# Patient Record
Sex: MALE | Race: WHITE | NOT HISPANIC OR LATINO | Employment: OTHER | URBAN - NONMETROPOLITAN AREA
[De-identification: names, ages, dates, MRNs, and addresses within clinical notes are randomized per-mention and may not be internally consistent; named-entity substitution may affect disease eponyms.]

---

## 2017-01-04 ENCOUNTER — TRANSCRIBE ORDERS (OUTPATIENT)
Dept: LAB | Facility: MEDICAL CENTER | Age: 67
End: 2017-01-04

## 2017-01-04 ENCOUNTER — APPOINTMENT (OUTPATIENT)
Dept: LAB | Facility: MEDICAL CENTER | Age: 67
End: 2017-01-04
Payer: MEDICARE

## 2017-01-04 DIAGNOSIS — G62.9 POLYNEUROPATHY: ICD-10-CM

## 2017-01-04 DIAGNOSIS — I73.9 PERIPHERAL VASCULAR DISEASE (HCC): ICD-10-CM

## 2017-01-04 DIAGNOSIS — I25.10 ATHEROSCLEROTIC HEART DISEASE OF NATIVE CORONARY ARTERY WITHOUT ANGINA PECTORIS: ICD-10-CM

## 2017-01-04 DIAGNOSIS — I10 ESSENTIAL (PRIMARY) HYPERTENSION: ICD-10-CM

## 2017-01-04 LAB
25(OH)D3 SERPL-MCNC: 55 NG/ML (ref 30–100)
ALBUMIN SERPL BCP-MCNC: 4 G/DL (ref 3.5–5)
ALP SERPL-CCNC: 66 U/L (ref 46–116)
ALT SERPL W P-5'-P-CCNC: 37 U/L (ref 12–78)
ANION GAP SERPL CALCULATED.3IONS-SCNC: 5 MMOL/L (ref 4–13)
AST SERPL W P-5'-P-CCNC: 24 U/L (ref 5–45)
BASOPHILS # BLD AUTO: 0.12 THOUSANDS/ΜL (ref 0–0.1)
BASOPHILS NFR BLD AUTO: 1 % (ref 0–1)
BILIRUB SERPL-MCNC: 1.09 MG/DL (ref 0.2–1)
BUN SERPL-MCNC: 22 MG/DL (ref 5–25)
CALCIUM SERPL-MCNC: 9.6 MG/DL (ref 8.3–10.1)
CHLORIDE SERPL-SCNC: 105 MMOL/L (ref 100–108)
CHOLEST SERPL-MCNC: 172 MG/DL (ref 50–200)
CO2 SERPL-SCNC: 29 MMOL/L (ref 21–32)
CREAT SERPL-MCNC: 1.24 MG/DL (ref 0.6–1.3)
EOSINOPHIL # BLD AUTO: 0.46 THOUSAND/ΜL (ref 0–0.61)
EOSINOPHIL NFR BLD AUTO: 4 % (ref 0–6)
ERYTHROCYTE [DISTWIDTH] IN BLOOD BY AUTOMATED COUNT: 14.6 % (ref 11.6–15.1)
EST. AVERAGE GLUCOSE BLD GHB EST-MCNC: 123 MG/DL
GFR SERPL CREATININE-BSD FRML MDRD: 58.3 ML/MIN/1.73SQ M
GLUCOSE SERPL-MCNC: 109 MG/DL (ref 65–140)
HBA1C MFR BLD: 5.9 % (ref 4.2–6.3)
HCT VFR BLD AUTO: 51.1 % (ref 36.5–49.3)
HDLC SERPL-MCNC: 39 MG/DL (ref 40–60)
HGB BLD-MCNC: 17.8 G/DL (ref 12–17)
LDLC SERPL CALC-MCNC: 111 MG/DL (ref 0–100)
LYMPHOCYTES # BLD AUTO: 3.91 THOUSANDS/ΜL (ref 0.6–4.47)
LYMPHOCYTES NFR BLD AUTO: 35 % (ref 14–44)
MCH RBC QN AUTO: 34.2 PG (ref 26.8–34.3)
MCHC RBC AUTO-ENTMCNC: 34.8 G/DL (ref 31.4–37.4)
MCV RBC AUTO: 98 FL (ref 82–98)
MONOCYTES # BLD AUTO: 1.51 THOUSAND/ΜL (ref 0.17–1.22)
MONOCYTES NFR BLD AUTO: 14 % (ref 4–12)
NEUTROPHILS # BLD AUTO: 5.12 THOUSANDS/ΜL (ref 1.85–7.62)
NEUTS SEG NFR BLD AUTO: 46 % (ref 43–75)
NRBC BLD AUTO-RTO: 0 /100 WBCS
PLATELET # BLD AUTO: 248 THOUSANDS/UL (ref 149–390)
PMV BLD AUTO: 9.7 FL (ref 8.9–12.7)
POTASSIUM SERPL-SCNC: 4.9 MMOL/L (ref 3.5–5.3)
PROT SERPL-MCNC: 7 G/DL (ref 6.4–8.2)
RBC # BLD AUTO: 5.2 MILLION/UL (ref 3.88–5.62)
SODIUM SERPL-SCNC: 139 MMOL/L (ref 136–145)
TRIGL SERPL-MCNC: 108 MG/DL
WBC # BLD AUTO: 11.15 THOUSAND/UL (ref 4.31–10.16)

## 2017-01-04 PROCEDURE — 80061 LIPID PANEL: CPT

## 2017-01-04 PROCEDURE — 36415 COLL VENOUS BLD VENIPUNCTURE: CPT

## 2017-01-04 PROCEDURE — 82306 VITAMIN D 25 HYDROXY: CPT

## 2017-01-04 PROCEDURE — 83036 HEMOGLOBIN GLYCOSYLATED A1C: CPT

## 2017-01-04 PROCEDURE — 85025 COMPLETE CBC W/AUTO DIFF WBC: CPT

## 2017-01-04 PROCEDURE — 80053 COMPREHEN METABOLIC PANEL: CPT

## 2017-01-05 ENCOUNTER — GENERIC CONVERSION - ENCOUNTER (OUTPATIENT)
Dept: OTHER | Facility: OTHER | Age: 67
End: 2017-01-05

## 2017-05-18 ENCOUNTER — APPOINTMENT (OUTPATIENT)
Dept: LAB | Facility: MEDICAL CENTER | Age: 67
End: 2017-05-18
Payer: MEDICARE

## 2017-05-18 ENCOUNTER — LAB CONVERSION - ENCOUNTER (OUTPATIENT)
Dept: OTHER | Facility: OTHER | Age: 67
End: 2017-05-18

## 2017-05-18 ENCOUNTER — TRANSCRIBE ORDERS (OUTPATIENT)
Dept: LAB | Facility: MEDICAL CENTER | Age: 67
End: 2017-05-18

## 2017-05-18 DIAGNOSIS — Z01.810 PRE-OPERATIVE CARDIOVASCULAR EXAMINATION: ICD-10-CM

## 2017-05-18 DIAGNOSIS — I25.10 ATHEROSCLEROSIS OF NATIVE CORONARY ARTERY, ANGINA PRESENCE UNSPECIFIED, UNSPECIFIED WHETHER NATIVE OR TRANSPLANTED HEART: Primary | ICD-10-CM

## 2017-05-18 DIAGNOSIS — I25.10 ATHEROSCLEROSIS OF NATIVE CORONARY ARTERY, ANGINA PRESENCE UNSPECIFIED, UNSPECIFIED WHETHER NATIVE OR TRANSPLANTED HEART: ICD-10-CM

## 2017-05-18 LAB
ANION GAP SERPL CALCULATED.3IONS-SCNC: 6 MMOL/L (ref 4–13)
BASOPHILS # BLD AUTO: 0.11 THOUSANDS/ΜL (ref 0–0.1)
BASOPHILS NFR BLD AUTO: 1 % (ref 0–1)
BUN SERPL-MCNC: 18 MG/DL (ref 5–25)
CALCIUM SERPL-MCNC: 9.9 MG/DL (ref 8.3–10.1)
CHLORIDE SERPL-SCNC: 102 MMOL/L (ref 100–108)
CO2 SERPL-SCNC: 30 MMOL/L (ref 21–32)
CREAT SERPL-MCNC: 1.28 MG/DL (ref 0.6–1.3)
EOSINOPHIL # BLD AUTO: 0.37 THOUSAND/ΜL (ref 0–0.61)
EOSINOPHIL NFR BLD AUTO: 3 % (ref 0–6)
ERYTHROCYTE [DISTWIDTH] IN BLOOD BY AUTOMATED COUNT: 15.1 % (ref 11.6–15.1)
GFR SERPL CREATININE-BSD FRML MDRD: 56.2 ML/MIN/1.73SQ M
GLUCOSE SERPL-MCNC: 126 MG/DL (ref 65–140)
HCT VFR BLD AUTO: 48 % (ref 36.5–49.3)
HGB BLD-MCNC: 16.2 G/DL (ref 12–17)
LYMPHOCYTES # BLD AUTO: 3.34 THOUSANDS/ΜL (ref 0.6–4.47)
LYMPHOCYTES NFR BLD AUTO: 30 % (ref 14–44)
MCH RBC QN AUTO: 32.5 PG (ref 26.8–34.3)
MCHC RBC AUTO-ENTMCNC: 33.8 G/DL (ref 31.4–37.4)
MCV RBC AUTO: 96 FL (ref 82–98)
MONOCYTES # BLD AUTO: 1.4 THOUSAND/ΜL (ref 0.17–1.22)
MONOCYTES NFR BLD AUTO: 13 % (ref 4–12)
NEUTROPHILS # BLD AUTO: 5.73 THOUSANDS/ΜL (ref 1.85–7.62)
NEUTS SEG NFR BLD AUTO: 53 % (ref 43–75)
NRBC BLD AUTO-RTO: 0 /100 WBCS
PLATELET # BLD AUTO: 275 THOUSANDS/UL (ref 149–390)
PMV BLD AUTO: 9.9 FL (ref 8.9–12.7)
POTASSIUM SERPL-SCNC: 4.1 MMOL/L (ref 3.5–5.3)
RBC # BLD AUTO: 4.99 MILLION/UL (ref 3.88–5.62)
SODIUM SERPL-SCNC: 138 MMOL/L (ref 136–145)
WBC # BLD AUTO: 10.99 THOUSAND/UL (ref 4.31–10.16)

## 2017-05-18 PROCEDURE — 80048 BASIC METABOLIC PNL TOTAL CA: CPT

## 2017-05-18 PROCEDURE — 85025 COMPLETE CBC W/AUTO DIFF WBC: CPT

## 2017-05-18 PROCEDURE — 36415 COLL VENOUS BLD VENIPUNCTURE: CPT

## 2017-05-31 ENCOUNTER — GENERIC CONVERSION - ENCOUNTER (OUTPATIENT)
Dept: OTHER | Facility: OTHER | Age: 67
End: 2017-05-31

## 2017-06-12 ENCOUNTER — GENERIC CONVERSION - ENCOUNTER (OUTPATIENT)
Dept: OTHER | Facility: OTHER | Age: 67
End: 2017-06-12

## 2017-06-19 ENCOUNTER — GENERIC CONVERSION - ENCOUNTER (OUTPATIENT)
Dept: OTHER | Facility: OTHER | Age: 67
End: 2017-06-19

## 2017-07-14 ENCOUNTER — APPOINTMENT (OUTPATIENT)
Dept: LAB | Facility: MEDICAL CENTER | Age: 67
End: 2017-07-14
Payer: MEDICARE

## 2017-07-14 ENCOUNTER — TRANSCRIBE ORDERS (OUTPATIENT)
Dept: LAB | Facility: MEDICAL CENTER | Age: 67
End: 2017-07-14

## 2017-07-14 ENCOUNTER — ALLSCRIPTS OFFICE VISIT (OUTPATIENT)
Dept: OTHER | Facility: OTHER | Age: 67
End: 2017-07-14

## 2017-07-14 DIAGNOSIS — E55.9 VITAMIN D DEFICIENCY: ICD-10-CM

## 2017-07-14 DIAGNOSIS — R53.83 OTHER FATIGUE: ICD-10-CM

## 2017-07-14 LAB
25(OH)D3 SERPL-MCNC: 33.5 NG/ML (ref 30–100)
ANION GAP SERPL CALCULATED.3IONS-SCNC: 4 MMOL/L (ref 4–13)
BUN SERPL-MCNC: 18 MG/DL (ref 5–25)
CALCIUM SERPL-MCNC: 9.4 MG/DL (ref 8.3–10.1)
CHLORIDE SERPL-SCNC: 100 MMOL/L (ref 100–108)
CO2 SERPL-SCNC: 29 MMOL/L (ref 21–32)
CREAT SERPL-MCNC: 1.16 MG/DL (ref 0.6–1.3)
ERYTHROCYTE [DISTWIDTH] IN BLOOD BY AUTOMATED COUNT: 15.8 % (ref 11.6–15.1)
GFR SERPL CREATININE-BSD FRML MDRD: >60 ML/MIN/1.73SQ M
GLUCOSE P FAST SERPL-MCNC: 111 MG/DL (ref 65–99)
HCT VFR BLD AUTO: 47 % (ref 36.5–49.3)
HGB BLD-MCNC: 15.9 G/DL (ref 12–17)
MAGNESIUM SERPL-MCNC: 2 MG/DL (ref 1.6–2.6)
MCH RBC QN AUTO: 31.5 PG (ref 26.8–34.3)
MCHC RBC AUTO-ENTMCNC: 33.8 G/DL (ref 31.4–37.4)
MCV RBC AUTO: 93 FL (ref 82–98)
PLATELET # BLD AUTO: 337 THOUSANDS/UL (ref 149–390)
PMV BLD AUTO: 9.6 FL (ref 8.9–12.7)
POTASSIUM SERPL-SCNC: 4.6 MMOL/L (ref 3.5–5.3)
RBC # BLD AUTO: 5.04 MILLION/UL (ref 3.88–5.62)
SODIUM SERPL-SCNC: 133 MMOL/L (ref 136–145)
TESTOST SERPL-MCNC: 320.8 NG/DL (ref 241–827)
TSH SERPL DL<=0.05 MIU/L-ACNC: 1.68 UIU/ML (ref 0.36–3.74)
WBC # BLD AUTO: 10.66 THOUSAND/UL (ref 4.31–10.16)

## 2017-07-14 PROCEDURE — 83735 ASSAY OF MAGNESIUM: CPT

## 2017-07-14 PROCEDURE — 86618 LYME DISEASE ANTIBODY: CPT

## 2017-07-14 PROCEDURE — 84443 ASSAY THYROID STIM HORMONE: CPT

## 2017-07-14 PROCEDURE — 84403 ASSAY OF TOTAL TESTOSTERONE: CPT

## 2017-07-14 PROCEDURE — 82306 VITAMIN D 25 HYDROXY: CPT

## 2017-07-14 PROCEDURE — 85027 COMPLETE CBC AUTOMATED: CPT

## 2017-07-14 PROCEDURE — 36415 COLL VENOUS BLD VENIPUNCTURE: CPT

## 2017-07-14 PROCEDURE — 80048 BASIC METABOLIC PNL TOTAL CA: CPT

## 2017-07-17 LAB
B BURGDOR IGG SER IA-ACNC: 0.15
B BURGDOR IGM SER IA-ACNC: 0.36

## 2017-08-24 ENCOUNTER — GENERIC CONVERSION - ENCOUNTER (OUTPATIENT)
Dept: OTHER | Facility: OTHER | Age: 67
End: 2017-08-24

## 2017-09-26 ENCOUNTER — GENERIC CONVERSION - ENCOUNTER (OUTPATIENT)
Dept: OTHER | Facility: OTHER | Age: 67
End: 2017-09-26

## 2017-09-28 ENCOUNTER — GENERIC CONVERSION - ENCOUNTER (OUTPATIENT)
Dept: OTHER | Facility: OTHER | Age: 67
End: 2017-09-28

## 2017-10-27 ENCOUNTER — GENERIC CONVERSION - ENCOUNTER (OUTPATIENT)
Dept: OTHER | Facility: OTHER | Age: 67
End: 2017-10-27

## 2018-01-04 ENCOUNTER — ALLSCRIPTS OFFICE VISIT (OUTPATIENT)
Dept: OTHER | Facility: OTHER | Age: 68
End: 2018-01-04

## 2018-01-04 DIAGNOSIS — Z13.89 ENCOUNTER FOR SCREENING FOR OTHER DISORDER: ICD-10-CM

## 2018-01-04 DIAGNOSIS — R10.9 ABDOMINAL PAIN: ICD-10-CM

## 2018-01-04 DIAGNOSIS — E55.9 VITAMIN D DEFICIENCY: ICD-10-CM

## 2018-01-04 DIAGNOSIS — I10 ESSENTIAL (PRIMARY) HYPERTENSION: ICD-10-CM

## 2018-01-04 DIAGNOSIS — I73.9 PERIPHERAL VASCULAR DISEASE (HCC): ICD-10-CM

## 2018-01-04 DIAGNOSIS — I25.10 ATHEROSCLEROTIC HEART DISEASE OF NATIVE CORONARY ARTERY WITHOUT ANGINA PECTORIS: ICD-10-CM

## 2018-01-04 DIAGNOSIS — Z12.5 ENCOUNTER FOR SCREENING FOR MALIGNANT NEOPLASM OF PROSTATE: ICD-10-CM

## 2018-01-08 ENCOUNTER — APPOINTMENT (OUTPATIENT)
Dept: LAB | Facility: MEDICAL CENTER | Age: 68
End: 2018-01-08
Payer: MEDICARE

## 2018-01-08 ENCOUNTER — TRANSCRIBE ORDERS (OUTPATIENT)
Dept: LAB | Facility: MEDICAL CENTER | Age: 68
End: 2018-01-08

## 2018-01-08 DIAGNOSIS — Z13.89 ENCOUNTER FOR SCREENING FOR OTHER DISORDER: ICD-10-CM

## 2018-01-08 DIAGNOSIS — I25.10 ATHEROSCLEROTIC HEART DISEASE OF NATIVE CORONARY ARTERY WITHOUT ANGINA PECTORIS: ICD-10-CM

## 2018-01-08 DIAGNOSIS — Z12.5 ENCOUNTER FOR SCREENING FOR MALIGNANT NEOPLASM OF PROSTATE: ICD-10-CM

## 2018-01-08 DIAGNOSIS — I10 ESSENTIAL (PRIMARY) HYPERTENSION: ICD-10-CM

## 2018-01-08 DIAGNOSIS — I73.9 PERIPHERAL VASCULAR DISEASE (HCC): ICD-10-CM

## 2018-01-08 LAB
ALBUMIN SERPL BCP-MCNC: 3.5 G/DL (ref 3.5–5)
ALP SERPL-CCNC: 103 U/L (ref 46–116)
ALT SERPL W P-5'-P-CCNC: 27 U/L (ref 12–78)
ANION GAP SERPL CALCULATED.3IONS-SCNC: 5 MMOL/L (ref 4–13)
AST SERPL W P-5'-P-CCNC: 24 U/L (ref 5–45)
BACTERIA UR QL AUTO: ABNORMAL /HPF
BILIRUB SERPL-MCNC: 0.64 MG/DL (ref 0.2–1)
BILIRUB UR QL STRIP: NEGATIVE
BUN SERPL-MCNC: 18 MG/DL (ref 5–25)
CALCIUM SERPL-MCNC: 9.3 MG/DL (ref 8.3–10.1)
CAOX CRY URNS QL MICRO: ABNORMAL /HPF
CHLORIDE SERPL-SCNC: 107 MMOL/L (ref 100–108)
CHOLEST SERPL-MCNC: 151 MG/DL (ref 50–200)
CLARITY UR: CLEAR
CO2 SERPL-SCNC: 26 MMOL/L (ref 21–32)
COLOR UR: YELLOW
CREAT SERPL-MCNC: 1.18 MG/DL (ref 0.6–1.3)
ERYTHROCYTE [SEDIMENTATION RATE] IN BLOOD: 11 MM/HOUR (ref 0–10)
GFR SERPL CREATININE-BSD FRML MDRD: 63 ML/MIN/1.73SQ M
GLUCOSE P FAST SERPL-MCNC: 113 MG/DL (ref 65–99)
GLUCOSE UR STRIP-MCNC: NEGATIVE MG/DL
HDLC SERPL-MCNC: 35 MG/DL (ref 40–60)
HGB UR QL STRIP.AUTO: NEGATIVE
HYALINE CASTS #/AREA URNS LPF: ABNORMAL /LPF
KETONES UR STRIP-MCNC: NEGATIVE MG/DL
LDLC SERPL CALC-MCNC: 98 MG/DL (ref 0–100)
LEUKOCYTE ESTERASE UR QL STRIP: NEGATIVE
MAGNESIUM SERPL-MCNC: 2.3 MG/DL (ref 1.6–2.6)
MUCOUS THREADS UR QL AUTO: ABNORMAL
NITRITE UR QL STRIP: NEGATIVE
NON-SQ EPI CELLS URNS QL MICRO: ABNORMAL /HPF
PH UR STRIP.AUTO: 6 [PH] (ref 4.5–8)
POTASSIUM SERPL-SCNC: 4.6 MMOL/L (ref 3.5–5.3)
PROT SERPL-MCNC: 7.2 G/DL (ref 6.4–8.2)
PROT UR STRIP-MCNC: ABNORMAL MG/DL
PSA SERPL-MCNC: 0.8 NG/ML (ref 0–4)
RBC #/AREA URNS AUTO: ABNORMAL /HPF
SODIUM SERPL-SCNC: 138 MMOL/L (ref 136–145)
SP GR UR STRIP.AUTO: 1.02 (ref 1–1.03)
TRIGL SERPL-MCNC: 90 MG/DL
UROBILINOGEN UR QL STRIP.AUTO: 1 E.U./DL
WBC #/AREA URNS AUTO: ABNORMAL /HPF

## 2018-01-08 PROCEDURE — 85652 RBC SED RATE AUTOMATED: CPT

## 2018-01-08 PROCEDURE — 81001 URINALYSIS AUTO W/SCOPE: CPT

## 2018-01-08 PROCEDURE — 36415 COLL VENOUS BLD VENIPUNCTURE: CPT

## 2018-01-08 PROCEDURE — 80053 COMPREHEN METABOLIC PANEL: CPT

## 2018-01-08 PROCEDURE — 83735 ASSAY OF MAGNESIUM: CPT

## 2018-01-08 PROCEDURE — G0103 PSA SCREENING: HCPCS

## 2018-01-08 PROCEDURE — 80061 LIPID PANEL: CPT

## 2018-01-09 ENCOUNTER — GENERIC CONVERSION - ENCOUNTER (OUTPATIENT)
Dept: OTHER | Facility: OTHER | Age: 68
End: 2018-01-09

## 2018-01-11 ENCOUNTER — HOSPITAL ENCOUNTER (OUTPATIENT)
Dept: RADIOLOGY | Facility: HOSPITAL | Age: 68
Discharge: HOME | End: 2018-01-11
Attending: INTERNAL MEDICINE
Payer: MEDICARE

## 2018-01-11 DIAGNOSIS — R10.9 ABDOMINAL PAIN: ICD-10-CM

## 2018-01-11 PROCEDURE — 74018 RADEX ABDOMEN 1 VIEW: CPT

## 2018-01-14 VITALS
WEIGHT: 158.13 LBS | HEIGHT: 70 IN | BODY MASS INDEX: 22.64 KG/M2 | OXYGEN SATURATION: 97 % | HEART RATE: 51 BPM | DIASTOLIC BLOOD PRESSURE: 72 MMHG | SYSTOLIC BLOOD PRESSURE: 118 MMHG | TEMPERATURE: 96.6 F

## 2018-01-22 VITALS
DIASTOLIC BLOOD PRESSURE: 74 MMHG | HEIGHT: 70 IN | BODY MASS INDEX: 25.09 KG/M2 | OXYGEN SATURATION: 98 % | SYSTOLIC BLOOD PRESSURE: 126 MMHG | HEART RATE: 51 BPM | TEMPERATURE: 96.9 F | WEIGHT: 175.25 LBS

## 2018-01-23 NOTE — PROGRESS NOTES
Assessment   1  Encounter for preventive health examination (V70 0) (Z00 00)    Plan  Atherosclerotic heart disease of native coronary artery without angina pectoris    · (1) LIPID PANEL, FASTING; Status:Active; Requested for:50Wdt8502; Atherosclerotic heart disease of native coronary artery without angina pectoris,  Hypertension, Neuropathy    · (1) HEMOGLOBIN A1C; Status:Active; Requested for:79Lof2917; Atherosclerotic heart disease of native coronary artery without angina pectoris, PVD  (peripheral vascular disease)    · (1) CBC/PLT/DIFF; Status:Active; Requested for:16Fyp5892; Health Maintenance    · *VB - Fall Risk Assessment  (Dx Z13 89 Screen for Neurologic Disorder);  Status:Complete;   Done: 40ESZ4252 01:08PM   · *VB - Urinary Incontinence Screen (Dx Z13 89 Screen for UI); Status:Complete;   Done:  20HGA9513 01:09PM   · *VB-Depression Screening; Status:Complete;   Done: 51QUG3953 01:09PM  Hypertension, Neuropathy    · (1) VITAMIN D 25-HYDROXY; Status:Active; Requested for:56Fjh3268;   Hypertension, PVD (peripheral vascular disease)    · (1) COMPREHENSIVE METABOLIC PANEL; Status:Active; Requested for:20Hhp2470;    · Follow-up visit in 1 year Evaluation and Treatment  Follow-up  Status: Hold For -  Scheduling  Requested for: 85Grc8086  Refused pneumococcal vaccination    · Temporarily Stop: Pneumo (Pneumovax)  Unlinked    · Temporarily Stop: Influenza    Discussion/Summary    Rx for fbw  Stop smoking  Cardio appt upcoming  Deferred flu shot1  Impression:1  Subsequent Annual Wellness Visit1 , with preventive exam as well as age and risk appropriate counseling completed1   Cardiovascular screening and counselin  screening is current1   Diabetes screening and counselin  screening is current1   Colorectal cancer screening and counselin  the patient declines screening1  and counseling was given on ways to eat a high fiber diet1      Prostate cancer screening and counselin  screening is current1   Osteoporosis screening and counselin  screening not indicated1 , counseling was given on obtaining adequate amounts of calcium and vitamin D on a daily basis1  and counseling was given on the importance of regular weightbearing exercise1   Abdominal aortic aneurysm screening and counselin  screening is current1   Glaucoma screening and counselin  screening is current1   HIV screening and counselin  screening not indicated1   Immunizations:1  The patient declines the influenza vaccination1 , the patient declines the pneumococcal vaccination1 , hepatitis B vaccination series is not indicated at this time due to the patient's low risk of aicha the disease1 , the patient declines the Zostavax vaccine1 , the patient declines the Td vaccine1  and the patient declines the Tdap vaccine1   Advance Directive Plannin  not complete1 , he was encouraged to follow-up with me to discuss his questions and/or decisions1   Advice and education were given regarding1  increasing physical activity1   Patient Discussion:1  plan discussed with the patient1 , follow-up visit needed in 6 months1 , follow-up as needed1   [facility]    The treatment plan was reviewed with the patient/guardian  The patient/guardian understands and agrees with the treatment plan1      Self Referrals: No       1 Amended By: Yfn Dodson; Dec 29 2016 8:28 PM EST    Chief Complaint  pt is being seen today for a wellness visit  discussed medication with pt  no med refills needed today  pt has no complaints or concerns  Advance Directives  Advance Directive [facility]:   YES - Patient has an advance health care directive  The patient has a living will located  in patient's home  History of Present Illness  HPI: Pt doing ok  Still smoking  no new sxs1    Welcome to [employer] and Wellness Visits: The patient is being seen for the  subsequent annual wellness visit1  1      Medicare Screening and Risk Factors1    Hospitalizations:1  he has been previously hospitalizied1  and he has been hospitalized cad,pvd times1   Once per lifetime medicare screening tests:1  ECG1  and AAA screening US has not yet been done1   Medicare Screening Tests Risk Questions   Abdominal aortic aneurysm risk assessment:1  tobacco use1  and over 72years of age2   Osteoporosis risk assessment:1  caucasian1 , over 48years of age2  and tobacco use1   HIV risk assessment:1  none indicated1   Drug and Alcohol Use:1  The patient  is a current cigarette smoker1  1   He  has smoked for 50 year(s)1  1   He  is not ready to quit using tobacco1  1   The patient reports  rare alcohol use1  1   Alcohol concern: 1   The patient has no concerns about alcohol abuse1   He  has never used illicit drugs1  1   Diet and Physical Activity:1  Current diet includes  low salt food choices1  1   He limited by claudication1   The patient does not exercise1    Mood Disorder and Cognitive Impairment Screenin    Depression screening1   Negative for symptoms1   He denies feeling down, depressed, or hopeless over the past two weeks1   He denies feeling little interest or pleasure in doing things over the past two weeks1   Cognitive impairment screenin  denies difficulty learning/retaining new information1 , denies difficulty handling complex tasks1 , denies difficulty with reasoning1 , denies difficulty with spatial ability and orientation1 , denies difficulty with language1  and denies difficulty with behavior1   Functional Ability/Level of Safety:1  Hearing is1  slightly decreased1  and a hearing aid is not used1   He denies hearing difficulties1  The patient is currently1  able to do activities of daily living with limitations1 , able to do instrumental activities of daily living with limitations1 , able to participate in social activities with limitations1  and able to drive with limitations1    Activities of daily living details:1  does not need help using the phone1 , no transportation help needed1 , does not need help shopping1 , no meal preparation help needed1 , does not need help doing housework1 , does not need help doing laundry1 , does not need help managing medications1  and does not need help managing money1   Fall risk factors: 1  The patient fell 0 times in the past 12 months  1   Home safety risk factors: 1  no unfamiliar surroundings1 , no loose rugs1 , no poor household lighting1 , no uneven floors1 , no household clutter1 , grab bars in the bathroom1  and handrails on the stairs1   Advance Directives:1  Advance directives: 1  no living will1 , no durable power of  for health care directives1  and no advance directives1   End of life decisions were reviewed with the patient1  and I agree with the patient's decisions1   Co-Managers and Medical Equipment/Suppliers: See Patient Care Team   Reviewed Updated [employer]:   Last Medicare Wellness Visit Information was reviewed, patient interviewed, no change since last AWV1   1 Amended By: Ann Bethea; Dec 29 2016 8:24 PM EST    Patient Care Team    Care Team Member Role Specialty Office Number   Ty Harry DO  Internal Medicine [phone]     Active Problems   1  Advance directive discussed with patient (V65 49) (Z71 89)  2  Atherosclerotic heart disease of native coronary artery without angina pectoris (414 01)   (I25 10)  3  Colonoscopy (Fiberoptic) Screening  4  Hypertension (401 9) (I10)  5  Neuropathy (355 9) (G62 9)  6  PVD (peripheral vascular disease) (443 9) (I73 9)  7  Shingles outbreak (053 9) (B02 9)  8  Special screening examination for neoplasm of prostate (V76 44) (Z12 5)  9  Vitamin D deficiency (268 9) (E55 9)    Past Medical History    The active problems and past medical history were reviewed and updated today        Surgical History    · History of Arterial Catheterization   · History of CABG   · History of Cath Stent Placement   · History of Implantable Cardioverter-Defibrillator    The surgical history was reviewed and updated today  Family History  Brother    · Family history of Polyps Of The Sigmoid Colon    The family history was reviewed and updated today  Social History    · Always uses seat belt   · Always uses sunscreen   · Being A Social Drinker   · Caffeine use (V49 89) (F15 90)   · Current every day smoker (305 1) (F17 200)   · Dental care, regularly   · No drug use   · Patient has living will (V49 89) (Z78 9)  The social history was reviewed and updated today  The social history was reviewed and is unchanged  Current Meds  1  Atorvastatin Calcium 40 MG Oral Tablet; Take 1 tablet daily Recorded  2  Digoxin 125 MCG Oral Tablet; TAKE 1 TABLET EVERY DAY Recorded  3  Enalapril Maleate 5 MG Oral Tablet; Therapy: 07TNG1409 to (Last Rx:13Oct2011)  Requested for: 13Oct2011 Ordered  4  Furosemide 20 MG Oral Tablet; take 1 tablet daily as needed Recorded  5  Gabapentin 300 MG Oral Capsule; TAKE 1 CAPSULE AT BEDTIME; Therapy: 27BKC7295 to )  Requested for: 20ZKM4161; Last   Rx:44Gss1339 Ordered  6  Isosorbide Mononitrate ER 30 MG Oral Tablet Extended Release 24 Hour; take one half   tablet daily Recorded  7  Metoprolol Succinate  MG Oral Tablet Extended Release 24 Hour; Therapy: 60OLF4774 to (Last Rx:13Oct2011)  Requested for: 13Oct2011 Ordered  8  Plavix 75 MG Oral Tablet; Therapy: 80FLE4376 to (Last Rx:13Oct2011)  Requested for: 13Oct2011 Ordered  9  Spironolactone 25 MG Oral Tablet; Therapy: 89VGI9109 to (Last Rx:13Oct2011)  Requested for: 13Oct2011 Ordered  10  Vitamin D3 21777 UNIT Oral Capsule; Take 1 each week; Therapy: 34DOO7009 to (Last Rx:72Cyq5723)  Requested for: 94Nik2726 Ordered  11  Xarelto 20 MG Oral Tablet; Take 1 tablet daily Recorded    The medication list was reviewed and updated today  Allergies   1   Penicillins    Immunizations   1    Influenza  Nov 2011 Vitals  Signs    Systolic: 006  Diastolic: 72   Temperature: 97 5 F  Height: 5 ft 10 in  Weight: 175 lb 4 00 oz  BMI Calculated: 25 15  BSA Calculated: 1 98    Physical Exam    Constitutional1    General appearance: No acute distress, well appearing and well nourished  1    Psychiatric1    Judgment and insight: Normal 1    Orientation to person, place and time: Normal 1    Recent and remote memory: Intact  1    Mood and affect: Normal 1        1 Amended By: Katty Crooks; Dec 29 2016 8:26 PM EST    Results/Data  Falls Risk Assessment (Dx Z13 89 Screen for Neurologic Disorder) 05Def2647 01:09PM User, Ahs     Test Name Result Flag Reference   Falls Risk      No falls in the past year     PHQ-2 Adult Depression Screening 84Kmw4048 01:09PM User, Ahs     Test Name Result Flag Reference   PHQ-2 Adult Depression Score 0     Over the last two weeks, how often have you been bothered by any of the following problems?   Little interest or pleasure in doing things: Not at all - 0  Feeling down, depressed, or hopeless: Not at all - 0   PHQ-2 Adult Depression Screening Negative       *VB - Urinary Incontinence Screen (Dx Z13 89 Screen for UI) 76IFQ2962 01:09PM Katty Sequin     Test Name Result Flag Reference   Urinary Incontinence Assessment 46QTY9739       *VB-Depression Screening 41GGD6294 01:09PM Katty Sequin     Test Name Result Flag Reference   Depression Scale Result      Depression Screen - Negative For Symptoms     *VB - Fall Risk Assessment  (Dx Z13 89 Screen for Neurologic Disorder) 77Ble0991 01:08PM Katty Sequin     Test Name Result Flag Reference   Falls Risk      No falls in the past year       Signatures   Electronically signed by : John South DO; Dec 29 2016  8:28PM EST                       (Author)

## 2018-01-25 ENCOUNTER — HOSPITAL ENCOUNTER (OUTPATIENT)
Dept: CT IMAGING | Facility: HOSPITAL | Age: 68
Discharge: HOME | End: 2018-01-25
Attending: INTERNAL MEDICINE
Payer: MEDICARE

## 2018-01-25 DIAGNOSIS — R10.9 ABDOMINAL PAIN: ICD-10-CM

## 2018-01-25 PROCEDURE — 74176 CT ABD & PELVIS W/O CONTRAST: CPT

## 2018-01-29 ENCOUNTER — TREATMENT (OUTPATIENT)
Dept: INTERNAL MEDICINE CLINIC | Facility: CLINIC | Age: 68
End: 2018-01-29

## 2018-01-29 DIAGNOSIS — N20.0 BILATERAL KIDNEY STONES: Primary | ICD-10-CM

## 2018-02-01 ENCOUNTER — CONSULT (OUTPATIENT)
Dept: UROLOGY | Facility: HOSPITAL | Age: 68
End: 2018-02-01
Payer: MEDICARE

## 2018-02-01 VITALS
SYSTOLIC BLOOD PRESSURE: 122 MMHG | DIASTOLIC BLOOD PRESSURE: 70 MMHG | HEIGHT: 69 IN | WEIGHT: 170 LBS | HEART RATE: 58 BPM | BODY MASS INDEX: 25.18 KG/M2

## 2018-02-01 DIAGNOSIS — N20.0 BILATERAL KIDNEY STONES: Primary | ICD-10-CM

## 2018-02-01 DIAGNOSIS — Z12.5 PROSTATE CANCER SCREENING: ICD-10-CM

## 2018-02-01 LAB
SL AMB  POCT GLUCOSE, UA: NORMAL
SL AMB LEUKOCYTE ESTERASE,UA: NORMAL
SL AMB POCT BILIRUBIN,UA: NORMAL
SL AMB POCT BLOOD,UA: NORMAL
SL AMB POCT CLARITY,UA: NORMAL
SL AMB POCT COLOR,UA: YELLOW
SL AMB POCT KETONES,UA: NORMAL
SL AMB POCT NITRITE,UA: NORMAL
SL AMB POCT PH,UA: 5
SL AMB POCT SPECIFIC GRAVITY,UA: 1.02

## 2018-02-01 PROCEDURE — 99204 OFFICE O/P NEW MOD 45 MIN: CPT | Performed by: UROLOGY

## 2018-02-01 PROCEDURE — 81002 URINALYSIS NONAUTO W/O SCOPE: CPT | Performed by: UROLOGY

## 2018-02-01 RX ORDER — DIGOXIN 125 MCG
1 TABLET ORAL DAILY
COMMUNITY
End: 2018-05-02 | Stop reason: ALTCHOICE

## 2018-02-01 RX ORDER — MAG HYDROX/ALUMINUM HYD/SIMETH 400-400-40
SUSPENSION, ORAL (FINAL DOSE FORM) ORAL WEEKLY
COMMUNITY
Start: 2015-12-24 | End: 2018-03-20

## 2018-02-01 RX ORDER — ISOSORBIDE MONONITRATE 30 MG/1
0.5 TABLET, EXTENDED RELEASE ORAL DAILY
COMMUNITY
End: 2018-03-20

## 2018-02-01 RX ORDER — METOPROLOL SUCCINATE 200 MG/1
TABLET, EXTENDED RELEASE ORAL
COMMUNITY
Start: 2011-10-13 | End: 2018-03-20

## 2018-02-01 RX ORDER — SPIRONOLACTONE 25 MG/1
25 TABLET ORAL DAILY
COMMUNITY
Start: 2011-10-13 | End: 2018-07-12 | Stop reason: SDUPTHER

## 2018-02-01 RX ORDER — CLOPIDOGREL BISULFATE 75 MG/1
75 TABLET ORAL DAILY
COMMUNITY
Start: 2011-10-13 | End: 2018-05-02 | Stop reason: ALTCHOICE

## 2018-02-01 RX ORDER — ENALAPRIL MALEATE 5 MG/1
5 TABLET ORAL DAILY
COMMUNITY
Start: 2011-10-13 | End: 2019-05-09 | Stop reason: SDUPTHER

## 2018-02-01 RX ORDER — ATORVASTATIN CALCIUM 40 MG/1
1 TABLET, FILM COATED ORAL DAILY
COMMUNITY
End: 2018-10-01 | Stop reason: SDUPTHER

## 2018-02-01 RX ORDER — FUROSEMIDE 20 MG/1
20 TABLET ORAL DAILY
COMMUNITY
End: 2018-07-12 | Stop reason: SDUPTHER

## 2018-02-01 NOTE — PATIENT INSTRUCTIONS
Prostate Specific Antigen   WHAT YOU NEED TO KNOW:   What is a prostate specific antigen (PSA) test?  A PSA test is a blood test used to screen men for prostate cancer  A PSA test is also used to monitor how well prostate cancer treatment is working  What other test may be done with a PSA test?  A digital rectal exam is usually performed with a PSA test  Your healthcare provider will insert a gloved finger into your rectum to feel if your prostate is large, firm, or has lumps  Who needs a PSA test?  Some experts recommend a PSA test for men ages 48 to 79  They also recommend testing men with a high risk for prostate cancer at age 36 or 39  Risk factors may include being  or having a brother or father with prostate cancer  Other experts may not recommend PSA testing  Your healthcare provider can help you decide if you need a PSA test    What do the results of a PSA test mean? Most healthy men have a PSA level less than 4 ng/mL  If your PSA level is higher than 4 ng/mL you may need more tests  Examples include blood or urine tests, an ultrasound, MRI, CT scan, or a prostate biopsy  Ask your healthcare provider for more information on these tests  What else can cause a high PSA level? A high PSA level does not always mean you have prostate cancer  Certain conditions or procedures can increase PSA levels  Examples include:  · Older age    · Procedures such as a prostate biopsy or a cystoscopy    · An enlarged prostate    · Recent sexual activity    · A prostate infection    · Certain exercises that put pressure on the prostate such as bicycling    · Medicine such as testosterone  CARE AGREEMENT:   You have the right to help plan your care  Learn about your health condition and how it may be treated  Discuss treatment options with your caregivers to decide what care you want to receive  You always have the right to refuse treatment  The above information is an  only   It is not intended as medical advice for individual conditions or treatments  Talk to your doctor, nurse or pharmacist before following any medical regimen to see if it is safe and effective for you  © 2017 [address]  Information is for End User's use only and may not be sold, redistributed or otherwise used for commercial purposes  All illustrations and images included in CareNotes® are the copyrighted property of [employer]  or [address]  Kidney Stones   WHAT YOU NEED TO KNOW:   What is a kidney stone? Kidney stones form in the urinary system when the water and waste in your urine are out of balance  When this happens, certain types of waste crystals separate from the urine  The crystals build up and form kidney stones  Kidney stones can be made of uric acid, calcium, phosphate, or oxalate crystals  You may have 1 or more kidney stones  What increases my risk for kidney stones? · You do not drink enough liquids (especially water) each day  · You have urinary tract infections often  · You follow a certain type of diet  For example, people who eat a diet high in meat or salt may be at higher risk for kidney stones  People who eat foods high in oxalate may also be at higher risk  Foods that are high in oxalate include nuts, chocolate, coffee, and green leafy vegetables  · You take certain medicines such as diuretics, steroids, and antacids  · A family member has had kidney stones  · You were born with a kidney or bowel disorder, or you have other medical problems such as gout  What are the signs and symptoms of kidney stones? · Pain in the middle of your back that moves across to your side or that may spread to your groin    · Nausea and vomiting    · Urge to urinate often, burning feeling when you urinate, or pink or red urine    · Tenderness in your lower back, side, or stomach  How are kidney stones diagnosed?   Your healthcare provider will ask about your health, diet, and lifestyle  He may refer you to a urologist  Silvia Nguyen may need tests to find out what type of kidney stones you have  Tests can show the size of your kidney stones and where they are in your urinary system  You may have one or more of the following:  · Urine tests  may show if you have blood in your urine  They may also show high amounts of the substances that form kidney stones, such as uric acid  · Blood tests  show how well your kidneys are working  They may also be used to check the levels of calcium or uric acid in your blood  · A noncontrast helical CT scan  is a type of x-ray that uses a computer to take pictures of your kidneys  Healthcare providers use the pictures to check for kidney stones or other problems  · X-rays  of your kidneys, bladder, and ureters may be done  You may be given a dye before the pictures are taken to help healthcare providers see the pictures better  You may need to have more than one x-ray  Tell the healthcare provider if you have ever had an allergic reaction to contrast dye  · An abdominal ultrasound  uses sound waves to show pictures of your abdomen on a monitor  How are kidney stones treated? · NSAIDs , such as ibuprofen, help decrease swelling, pain, and fever  This medicine is available with or without a doctor's order  NSAIDs can cause stomach bleeding or kidney problems in certain people  If you take blood thinner medicine, always ask your healthcare provider if NSAIDs are safe for you  Always read the medicine label and follow directions  · Prescription medicine  may be given  Ask how to take this medicine safely  · Medicines  to balance your electrolytes may be needed  · A procedure or surgery  to remove the kidney stones may be needed if they do not pass on their own  Your treatment will depend on the size and location of your kidney stones  How can I manage my symptoms? · Drink plenty of liquids    Your healthcare provider may tell you to drink at least 8 to 12 (eight-ounce) cups of liquids each day  This helps flush out the kidney stones when you urinate  Water is the best liquid to drink  · Strain your urine every time you go to the bathroom  Urinate through a strainer or a piece of thin cloth to catch the stones  Take the stones to your healthcare provider so they can be sent to the lab for tests  This will help your healthcare providers plan the best treatment for you  · Eat a variety of healthy foods  Healthy foods include fruits, vegetables, whole-grain breads, low-fat dairy products, beans, and fish  You may need to limit how much sodium (salt) or protein you eat  Ask for information about the best foods for you  · Exercise regularly  Your stones may pass more easily if you stay active  Ask about the best activities for you  When should I seek immediate care? · You have vomiting that is not relieved by medicine  When should I contact my healthcare provider? · You have a fever  · You have trouble passing urine  · You see blood in your urine  · You have severe pain  · You have any questions or concerns about your condition or care  CARE AGREEMENT:   You have the right to help plan your care  Learn about your health condition and how it may be treated  Discuss treatment options with your caregivers to decide what care you want to receive  You always have the right to refuse treatment  The above information is an  only  It is not intended as medical advice for individual conditions or treatments  Talk to your doctor, nurse or pharmacist before following any medical regimen to see if it is safe and effective for you  © 2017 [address] Information is for End User's use only and may not be sold, redistributed or otherwise used for commercial purposes   All illustrations and images included in CareNotes® are the copyrighted property of A [employer] , Inc  or [employer]

## 2018-02-01 NOTE — LETTER
February 1, 2018     Dolly Fink DO  [address]  [place] [id number]    Patient: Joshua Baptiste   YOB: 1950   Date of Visit: 2/1/2018       Dear Dr Fer Rosa: Thank you for referring Irineo Langston to me for evaluation  Below are my notes for this consultation  If you have questions, please do not hesitate to call me  I look forward to following your patient along with you  Sincerely,        Daly Iglesias MD        CC: DO Daly Gerard MD  2/1/2018  1:19 PM  Sign at close encounter    UROLOGY NEW CONSULT NOTE     History of Present Illness:   Joshua Baptiste is a 79 y o  male here with recent imaging questionable for kidney stones  Patient has no history of kidney stones in the past   He denies flank pain  He has some lower abdominal discomfort  He reports occasional constipation  He does not have abdominal pain after eating  He does have a very significant cardiovascular history with vascular occlusion  He has had bilateral iliac stents  He has been seen by the vascular surgery team and been told he needs an aortic bypass  However, his cardiologist is felt that this is potentially too risky  Patient has had PSA screening but an unclear history of rectal exams  Past Medical History:     Past Medical History:   Diagnosis Date    Heart disease     Hypertension     Neuropathy     PVD (peripheral vascular disease) ([facility] [address] )     Vitamin D deficiency        PAST SURGICAL HISTORY:     Past Surgical History:   Procedure Laterality Date    CARDIAC CATHETERIZATION  1999    CARDIAC DEFIBRILLATOR PLACEMENT  2009    CORONARY ARTERY BYPASS GRAFT  1999       CURRENT MEDICATIONS:     No current outpatient prescriptions on file  No current facility-administered medications for this visit          ALLERGIES:   Allergies not on file    SOCIAL HISTORY:     Social History     Social History    Marital status: /Civil Union Spouse name: N/A    Number of children: N/A    Years of education: N/A     Social History Main Topics    Smoking status: Current Every Day Smoker    Smokeless tobacco: Not on file    Alcohol use Yes      Comment: social    Drug use: No    Sexual activity: Not on file     Other Topics Concern    Not on file     Social History Narrative    No narrative on file       SOCIAL HISTORY:     Family History   Problem Relation Age of Onset    Colon polyps Brother        REVIEW OF SYSTEMS:     General: negative for chills, fatigue, fever, significant unplanned weight changed  Psychological: negative for anxiety, depression, concentration or memory difficulties, irritability, mood swings, sleep disturbances  Ophthalmic: negative for blurry vision or double  ENT: negative for hearing difficulties, tinnitus, vertigo  Hematological and Lymphatic: negative for bleeding problems, blood clots, bruising, swollen lymph nodes  Respiratory: negative for shortness of breath, cough, hemoptysis, orthopnea, tachypnea or wheezing  Cardiovascular: negative for chest pain, dyspnea on exertion, edema, irregular or rapid heartbeat, paroxysmal nocturnal dyspnea  Gastrointestinal: negative for abdominal pain, bright red blood in stools, change in stools, constipation, diarrhea, nausea/vomiting, stool incontinence    GENITOURINARY: see HPI    Musculoskeletal: negative for gait disturbance, joint pain, joint stiffness/sweeling/pain, muscular weakness  Dermatological: negative for rash or skin lesion changes  Neurological: negative for confusion, dizziness, headaches, memory loss, numbness/tingling, seizures, speech problems, tremors or weakness    PHYSICAL EXAM:     There were no vitals taken for this visit  General:  Healthy appearing individual in no acute distress  They have a normal affect  There is not appear to be any gross neurologic defects or abnormalities  HEENT:  Normocephalic, atraumatic    Neck is supple without any palpable lymphadenopathy  Cardiovascular:  Patient has normal palpable distal radial pulses  There is no significant peripheral edema  No JVD is noted  Respiratory:  Patient has unlabored respirations  There is no audible wheeze or rhonchi  Abdomen:  Abdomen is soft and nontender  There is no tympany  Inguinal and umbilical hernia are not appreciated  Genitourinary: no penile lesions or discharge, no testicular masses or tenderness, no hernias    Musculoskeletal:  Patient does not have significant CVA tenderness in the bilateral flank with palpation or percussion  They full range of motion in all 4 extremities  Strength in all 4 extremities appears congruent  Patient is able to ambulate without assistance or difficulty  Dermatologic:  Patient has no skin abnormalities or rashes  LABS:     CBC:   Lab Results   Component Value Date    WBC 10 66 (H) 07/14/2017    HGB 15 9 07/14/2017    HCT 47 0 07/14/2017    MCV 93 07/14/2017     07/14/2017       BMP:   Lab Results   Component Value Date    GLUCOSE 126 05/18/2017    CALCIUM 9 3 01/08/2018     01/08/2018    K 4 6 01/08/2018    CO2 26 01/08/2018     01/08/2018    BUN 18 01/08/2018    CREATININE 1 18 01/08/2018     Lab Results   Component Value Date    PSA 0 8 01/08/2018    PSA 1 0 10/07/2016       URINE:  Urine Microscopic   Order: [id number] - Reflex for Order [id number]   Status:  Final result   Visible to patient:  No (Inaccessible in [address])   Next appt: Today at 01:00 PM in Urology Daly Iglesias MD)   Dx:  Essential (primary) hypertension; Enc      Ref Range & Units 1/8/18  7:08 AM Flag   RBC, UA None Seen, 0-5 /hpf  None Seen     WBC, UA None Seen, 0-5, 5-55, 5-65 /hpf  None Seen     Epithelial Cells None Seen, Occasional /hpf  None Seen     Bacteria, UA None Seen, Occasional /hpf  Occasional     Hyaline Casts, UA None Seen /lpf 0-3   A    Ca Oxalate Manju, UA None Seen /hpf  Moderate   A    MUCOUS THREADS Occasional, Moderate, Innumerable  Occasional        Specimen Collected: 18  7:08 AM Last Resulted: 18  7:51 PM              IMAGIN/25/18  CT ABDOMEN AND PELVIS WITHOUT IV CONTRAST     INDICATION:  Abdominal pain     COMPARISON: Abdominal radiograph 2018     TECHNIQUE:  CT examination of the abdomen and pelvis was performed without intravenous contrast   Reformatted images were created in axial, sagittal, and coronal planes        Radiation dose length product (DLP) for this visit:  322 28 mGy-cm   This examination, like all CT scans performed in the [facility], was performed utilizing techniques to minimize radiation dose exposure, including the use of iterative   reconstruction and automated exposure control       Enteric contrast was administered       FINDINGS:     ABDOMEN     LOWER CHEST:  Atelectatic changes are noted at the lung bases  No other significant abnormality identified in the lower chest      LIVER/BILIARY TREE:  Unremarkable      GALLBLADDER:  There are gallstone(s) within the gallbladder, without pericholecystic inflammatory changes      SPLEEN:  Unremarkable      PANCREAS:  Unremarkable      ADRENAL GLANDS:  Unremarkable      KIDNEYS/URETERS:  Punctate 2 mm bilateral nonobstructing renal calculus  There are bilateral renal vascular calcifications  No ureteral calculus or obstructive uropathy      STOMACH AND BOWEL:  Small hiatal hernia with reflux of contrast into the distal esophagus  No bowel obstruction  Mild stool retention throughout the colon      APPENDIX:  A normal appendix was visualized      ABDOMINOPELVIC CAVITY:  No ascites or free intraperitoneal air  No lymphadenopathy      VESSELS:  There are atherosclerotic changes of the abdominal aorta and its major branches  There are bilateral iliac stents    No aneurysm      PELVIS     REPRODUCTIVE ORGANS:  There are calcifications within the prostate      URINARY BLADDER:  Unremarkable      ABDOMINAL WALL/INGUINAL REGIONS:  There are fat-containing bilateral inguinal hernias containing fluid      OSSEOUS STRUCTURES:  No acute fracture or destructive osseous lesion      IMPRESSION:  1  Bilateral nonobstructing renal calculi  No obstructive uropathy  2   Cholelithiasis without other evidence of acute cholecystitis  3   Mild stool retention throughout the colon  4   Small hiatal hernia with reflux of contrast into the distal esophagus  ASSESSMENT:     79 y o  male with bilateral nonobstructing calculi    PLAN:     1  I personally reviewed the images from his CT and I do feel that the patient's picture is more consistent with vascular calcifications that collecting system stones  Regardless, any calcification are small and nonobstructing and are not the cause of his lower abdominal pain  I would be happy to see him back in 1 year with an x-ray and ultrasound  Should the patient developed significant symptoms, he should call my office  I did recommend he discuss blood flow to his abdominal organs when he sees his cardiologist upcoming  Certainly with his vascular history, this should be evaluated by a vascular team  I offered the patient a referral to a vascular surgeon but he defers as he has a team he has previously seen and is closely following with his cardiologist     For nonobstructing and asymptomatic stones I typically recommend non operative surveillance with yearly imaging in the form of x-ray and ultrasound testing  Should the patient develops symptoms, they know to call me so we can expedite sooner films and discuss sooner intervention  If the stones remain nonobstructing and asymptomatic, I will typically continue surveillance until the stones reach a larger size, approximately 1 5 cm     2    I gave the patient a general overview surrounding prostate health  We discussed the gland's anatomy and function  We discussed that PSA is protein made by the prostate gland in normal healthy males       When screening for prostate cancer, we evaluate man at high risk for prostate cancer or those men within a predefined age range who have a lfe expectancy greater than ten years  These screening guidelines were set forth by our colleagues at the [address] [place] [address] in an effort to help find early, treatable cancers but als to minimize worry and harm caused by over-screening and over-treatment  Screening is performed by both examining the prostate via a digital rectal exam and by checking a PSA in routine bloodwork  Should there PSA be elevated outside of an acceptable range or if they're found to have abnormalities on digital rectal exam, a careful discussion needs to be held about proceeding to the next step in management and obtaining tissue through prostate biopsy for evaluation for cancer  We discussed that PSA is an imperfect screening tool and there are other reasons at the PSA can be elevated including but not limited to urinary infection, section transmitted infection, benign prostatic enlargement, urinary stones, trauma, and recent sexual activity  The patient and I discussed his medical comorbidities  It is unclear whether not the patient has a 10 year life expectancy given his major cardiovascular issues  I offered him either yearly FRITZ and PSA screening or the option of deferring prostate cancer screening given his other competing comorbidities  He agrees that prostate cancer screening is not prudent at this point time given the other active issues  We will again discuss prostate cancer screening when he returns to see me in 1 year  I will recommend to his primary care team that no additional PSAs are undertaken and I would be happy to order these in the future if the patient desires

## 2018-02-01 NOTE — PROGRESS NOTES
UROLOGY NEW CONSULT NOTE     History of Present Illness:   Alexander Garza is a 79 y o  male here with recent imaging questionable for kidney stones  Patient has no history of kidney stones in the past   He denies flank pain  He has some lower abdominal discomfort  He reports occasional constipation  He does not have abdominal pain after eating  He does have a very significant cardiovascular history with vascular occlusion  He has had bilateral iliac stents  He has been seen by the vascular surgery team and been told he needs an aortic bypass  However, his cardiologist is felt that this is potentially too risky  Patient has had PSA screening but an unclear history of rectal exams  Past Medical History:     Past Medical History:   Diagnosis Date    Heart disease     Hypertension     Neuropathy     PVD [facility]peripheral vascular disease) ([facility] [address]     Vitamin D deficiency        PAST SURGICAL HISTORY:     Past Surgical History:   Procedure Laterality Date    CARDIAC CATHETERIZATION  1999    CARDIAC DEFIBRILLATOR PLACEMENT  2009    CORONARY ARTERY BYPASS GRAFT  1999       CURRENT MEDICATIONS:     No current outpatient prescriptions on file  No current facility-administered medications for this visit          ALLERGIES:   Allergies not on file    SOCIAL HISTORY:     Social History     Social History    Marital status: /Civil Union     Spouse name: N/A    Number of children: N/A    Years of education: N/A     Social History Main Topics    Smoking status: Current Every Day Smoker    Smokeless tobacco: Not on file    Alcohol use Yes      Comment: social    Drug use: No    Sexual activity: Not on file     Other Topics Concern    Not on file     Social History Narrative    No narrative on file       SOCIAL HISTORY:     Family History   Problem Relation Age of Onset    Colon polyps Brother        REVIEW OF SYSTEMS:     General: negative for chills, fatigue, fever, significant unplanned weight changed  Psychological: negative for anxiety, depression, concentration or memory difficulties, irritability, mood swings, sleep disturbances  Ophthalmic: negative for blurry vision or double  ENT: negative for hearing difficulties, tinnitus, vertigo  Hematological and Lymphatic: negative for bleeding problems, blood clots, bruising, swollen lymph nodes  Respiratory: negative for shortness of breath, cough, hemoptysis, orthopnea, tachypnea or wheezing  Cardiovascular: negative for chest pain, dyspnea on exertion, edema, irregular or rapid heartbeat, paroxysmal nocturnal dyspnea  Gastrointestinal: negative for abdominal pain, bright red blood in stools, change in stools, constipation, diarrhea, nausea/vomiting, stool incontinence    GENITOURINARY: see HPI    Musculoskeletal: negative for gait disturbance, joint pain, joint stiffness/sweeling/pain, muscular weakness  Dermatological: negative for rash or skin lesion changes  Neurological: negative for confusion, dizziness, headaches, memory loss, numbness/tingling, seizures, speech problems, tremors or weakness    PHYSICAL EXAM:     There were no vitals taken for this visit  General:  Healthy appearing individual in no acute distress  They have a normal affect  There is not appear to be any gross neurologic defects or abnormalities  HEENT:  Normocephalic, atraumatic  Neck is supple without any palpable lymphadenopathy  Cardiovascular:  Patient has normal palpable distal radial pulses  There is no significant peripheral edema  No JVD is noted  Respiratory:  Patient has unlabored respirations  There is no audible wheeze or rhonchi  Abdomen:  Abdomen is soft and nontender  There is no tympany  Inguinal and umbilical hernia are not appreciated      Genitourinary: no penile lesions or discharge, no testicular masses or tenderness, no hernias    Musculoskeletal:  Patient does not have significant CVA tenderness in the bilateral flank with palpation or percussion  They full range of motion in all 4 extremities  Strength in all 4 extremities appears congruent  Patient is able to ambulate without assistance or difficulty  Dermatologic:  Patient has no skin abnormalities or rashes  LABS:     CBC:   Lab Results   Component Value Date    WBC 10 66 (H) 2017    HGB 15 9 2017    HCT 47 0 2017    MCV 93 2017     2017       BMP:   Lab Results   Component Value Date    GLUCOSE 126 2017    CALCIUM 9 3 2018     2018    K 4 6 2018    CO2 26 2018     2018    BUN 18 2018    CREATININE 1 18 2018     Lab Results   Component Value Date    PSA 0 8 2018    PSA 1 0 10/07/2016       URINE:  Urine Microscopic   Order: [id number] - Reflex for Order [id number]   Status:  Final result   Visible to patient:  No (Inaccessible in [address])   Next appt: Today at 01:00 PM in Urology Will Jose MD)   Dx:  Essential (primary) hypertension; Enc  Ref Range & Units 18  7:08 AM Flag   RBC, UA None Seen, 0-5 /hpf  None Seen     WBC, UA None Seen, 0-5, 5-55, 5-65 /hpf  None Seen     Epithelial Cells None Seen, Occasional /hpf  None Seen     Bacteria, UA None Seen, Occasional /hpf  Occasional     Hyaline Casts, UA None Seen /lpf 0-3   A    Ca Oxalate Manju, UA None Seen /hpf  Moderate   A    MUCOUS THREADS Occasional, Moderate, Innumerable  Occasional        Specimen Collected: 18  7:08 AM Last Resulted: 18  7:51 PM              IMAGIN/25/18  CT ABDOMEN AND PELVIS WITHOUT IV CONTRAST     INDICATION:  Abdominal pain     COMPARISON: Abdominal radiograph 2018     TECHNIQUE:  CT examination of the abdomen and pelvis was performed without intravenous contrast   Reformatted images were created in axial, sagittal, and coronal planes        Radiation dose length product (DLP) for this visit:  322 28 mGy-cm     This examination, like all CT scans performed in the [facility], was performed utilizing techniques to minimize radiation dose exposure, including the use of iterative   reconstruction and automated exposure control       Enteric contrast was administered       FINDINGS:     ABDOMEN     LOWER CHEST:  Atelectatic changes are noted at the lung bases  No other significant abnormality identified in the lower chest      LIVER/BILIARY TREE:  Unremarkable      GALLBLADDER:  There are gallstone(s) within the gallbladder, without pericholecystic inflammatory changes      SPLEEN:  Unremarkable      PANCREAS:  Unremarkable      ADRENAL GLANDS:  Unremarkable      KIDNEYS/URETERS:  Punctate 2 mm bilateral nonobstructing renal calculus  There are bilateral renal vascular calcifications  No ureteral calculus or obstructive uropathy      STOMACH AND BOWEL:  Small hiatal hernia with reflux of contrast into the distal esophagus  No bowel obstruction  Mild stool retention throughout the colon      APPENDIX:  A normal appendix was visualized      ABDOMINOPELVIC CAVITY:  No ascites or free intraperitoneal air  No lymphadenopathy      VESSELS:  There are atherosclerotic changes of the abdominal aorta and its major branches  There are bilateral iliac stents  No aneurysm      PELVIS     REPRODUCTIVE ORGANS:  There are calcifications within the prostate      URINARY BLADDER:  Unremarkable      ABDOMINAL WALL/INGUINAL REGIONS:  There are fat-containing bilateral inguinal hernias containing fluid      OSSEOUS STRUCTURES:  No acute fracture or destructive osseous lesion      IMPRESSION:  1  Bilateral nonobstructing renal calculi  No obstructive uropathy  2   Cholelithiasis without other evidence of acute cholecystitis  3   Mild stool retention throughout the colon  4   Small hiatal hernia with reflux of contrast into the distal esophagus  ASSESSMENT:     79 y o  male with bilateral nonobstructing calculi    PLAN:     1  I personally reviewed the images from his CT and I do feel that the patient's picture is more consistent with vascular calcifications that collecting system stones  Regardless, any calcification are small and nonobstructing and are not the cause of his lower abdominal pain  I would be happy to see him back in 1 year with an x-ray and ultrasound  Should the patient developed significant symptoms, he should call my office  I did recommend he discuss blood flow to his abdominal organs when he sees his cardiologist upcoming  Certainly with his vascular history, this should be evaluated by a vascular team  I offered the patient a referral to a vascular surgeon but he defers as he has a team he has previously seen and is closely following with his cardiologist     For nonobstructing and asymptomatic stones I typically recommend non operative surveillance with yearly imaging in the form of x-ray and ultrasound testing  Should the patient develops symptoms, they know to call me so we can expedite sooner films and discuss sooner intervention  If the stones remain nonobstructing and asymptomatic, I will typically continue surveillance until the stones reach a larger size, approximately 1 5 cm     2    I gave the patient a general overview surrounding prostate health  We discussed the gland's anatomy and function  We discussed that PSA is protein made by the prostate gland in normal healthy males  When screening for prostate cancer, we evaluate man at high risk for prostate cancer or those men within a predefined age range who have a lfe expectancy greater than ten years  These screening guidelines were set forth by our colleagues at the [address] [place] [address] in an effort to help find early, treatable cancers but als to minimize worry and harm caused by over-screening and over-treatment  Screening is performed by both examining the prostate via a digital rectal exam and by checking a PSA in routine bloodwork      Should there PSA be elevated outside of an acceptable range or if they're found to have abnormalities on digital rectal exam, a careful discussion needs to be held about proceeding to the next step in management and obtaining tissue through prostate biopsy for evaluation for cancer  We discussed that PSA is an imperfect screening tool and there are other reasons at the PSA can be elevated including but not limited to urinary infection, section transmitted infection, benign prostatic enlargement, urinary stones, trauma, and recent sexual activity  The patient and I discussed his medical comorbidities  It is unclear whether not the patient has a 10 year life expectancy given his major cardiovascular issues  I offered him either yearly FRITZ and PSA screening or the option of deferring prostate cancer screening given his other competing comorbidities  He agrees that prostate cancer screening is not prudent at this point time given the other active issues  We will again discuss prostate cancer screening when he returns to see me in 1 year  I will recommend to his primary care team that no additional PSAs are undertaken and I would be happy to order these in the future if the patient desires

## 2018-03-20 ENCOUNTER — APPOINTMENT (EMERGENCY)
Dept: RADIOLOGY | Facility: HOSPITAL | Age: 68
DRG: 310 | End: 2018-03-20
Payer: MEDICARE

## 2018-03-20 ENCOUNTER — HOSPITAL ENCOUNTER (INPATIENT)
Facility: HOSPITAL | Age: 68
LOS: 1 days | Discharge: ANOTHER HOSPITAL | DRG: 310 | End: 2018-03-22
Attending: EMERGENCY MEDICINE | Admitting: INTERNAL MEDICINE
Payer: MEDICARE

## 2018-03-20 DIAGNOSIS — R55 SYNCOPE: Primary | ICD-10-CM

## 2018-03-20 DIAGNOSIS — R77.8 ELEVATED TROPONIN: ICD-10-CM

## 2018-03-20 DIAGNOSIS — Z95.810 AICD (AUTOMATIC CARDIOVERTER/DEFIBRILLATOR) PRESENT: ICD-10-CM

## 2018-03-20 PROBLEM — I25.5 ISCHEMIC CARDIOMYOPATHY: Chronic | Status: ACTIVE | Noted: 2018-03-20

## 2018-03-20 PROBLEM — Z95.1 S/P CABG (CORONARY ARTERY BYPASS GRAFT): Chronic | Status: ACTIVE | Noted: 2018-03-20

## 2018-03-20 PROBLEM — Z72.0 TOBACCO ABUSE: Chronic | Status: ACTIVE | Noted: 2018-03-20

## 2018-03-20 PROBLEM — I73.9 PERIPHERAL ARTERIAL DISEASE (HCC): Chronic | Status: ACTIVE | Noted: 2018-03-20

## 2018-03-20 PROBLEM — I10 ESSENTIAL HYPERTENSION: Chronic | Status: ACTIVE | Noted: 2018-03-20

## 2018-03-20 LAB
ANION GAP SERPL CALCULATED.3IONS-SCNC: 11 MMOL/L (ref 4–13)
BASOPHILS # BLD AUTO: 0.11 THOUSANDS/ΜL (ref 0–0.1)
BASOPHILS NFR BLD AUTO: 1 % (ref 0–1)
BUN SERPL-MCNC: 20 MG/DL (ref 5–25)
CALCIUM SERPL-MCNC: 9.6 MG/DL (ref 8.3–10.1)
CHLORIDE SERPL-SCNC: 104 MMOL/L (ref 100–108)
CO2 SERPL-SCNC: 26 MMOL/L (ref 21–32)
CREAT SERPL-MCNC: 1.08 MG/DL (ref 0.6–1.3)
DIGOXIN SERPL-MCNC: 0.9 NG/ML (ref 0.8–2)
EOSINOPHIL # BLD AUTO: 0.36 THOUSAND/ΜL (ref 0–0.61)
EOSINOPHIL NFR BLD AUTO: 3 % (ref 0–6)
ERYTHROCYTE [DISTWIDTH] IN BLOOD BY AUTOMATED COUNT: 16.1 % (ref 11.6–15.1)
GFR SERPL CREATININE-BSD FRML MDRD: 71 ML/MIN/1.73SQ M
GLUCOSE SERPL-MCNC: 106 MG/DL (ref 65–140)
HCT VFR BLD AUTO: 49.1 % (ref 36.5–49.3)
HGB BLD-MCNC: 16.6 G/DL (ref 12–17)
INR PPP: 3.15 (ref 0.86–1.16)
LYMPHOCYTES # BLD AUTO: 2.54 THOUSANDS/ΜL (ref 0.6–4.47)
LYMPHOCYTES NFR BLD AUTO: 22 % (ref 14–44)
MAGNESIUM SERPL-MCNC: 1.9 MG/DL (ref 1.6–2.6)
MCH RBC QN AUTO: 30.6 PG (ref 26.8–34.3)
MCHC RBC AUTO-ENTMCNC: 33.8 G/DL (ref 31.4–37.4)
MCV RBC AUTO: 91 FL (ref 82–98)
MONOCYTES # BLD AUTO: 1.29 THOUSAND/ΜL (ref 0.17–1.22)
MONOCYTES NFR BLD AUTO: 11 % (ref 4–12)
NEUTROPHILS # BLD AUTO: 7.04 THOUSANDS/ΜL (ref 1.85–7.62)
NEUTS SEG NFR BLD AUTO: 63 % (ref 43–75)
PLATELET # BLD AUTO: 292 THOUSANDS/UL (ref 149–390)
PMV BLD AUTO: 9.1 FL (ref 8.9–12.7)
POTASSIUM SERPL-SCNC: 3.7 MMOL/L (ref 3.5–5.3)
PROTHROMBIN TIME: 32.6 SECONDS (ref 12.1–14.4)
RBC # BLD AUTO: 5.42 MILLION/UL (ref 3.88–5.62)
SODIUM SERPL-SCNC: 141 MMOL/L (ref 136–145)
TROPONIN I SERPL-MCNC: 0.05 NG/ML
WBC # BLD AUTO: 11.34 THOUSAND/UL (ref 4.31–10.16)

## 2018-03-20 PROCEDURE — 85025 COMPLETE CBC W/AUTO DIFF WBC: CPT | Performed by: EMERGENCY MEDICINE

## 2018-03-20 PROCEDURE — 85610 PROTHROMBIN TIME: CPT | Performed by: EMERGENCY MEDICINE

## 2018-03-20 PROCEDURE — 80162 ASSAY OF DIGOXIN TOTAL: CPT | Performed by: EMERGENCY MEDICINE

## 2018-03-20 PROCEDURE — 71046 X-RAY EXAM CHEST 2 VIEWS: CPT

## 2018-03-20 PROCEDURE — 99220 PR INITIAL OBSERVATION CARE/DAY 70 MINUTES: CPT | Performed by: INTERNAL MEDICINE

## 2018-03-20 PROCEDURE — 84484 ASSAY OF TROPONIN QUANT: CPT | Performed by: EMERGENCY MEDICINE

## 2018-03-20 PROCEDURE — 99285 EMERGENCY DEPT VISIT HI MDM: CPT

## 2018-03-20 PROCEDURE — 80048 BASIC METABOLIC PNL TOTAL CA: CPT | Performed by: EMERGENCY MEDICINE

## 2018-03-20 PROCEDURE — 36415 COLL VENOUS BLD VENIPUNCTURE: CPT | Performed by: EMERGENCY MEDICINE

## 2018-03-20 PROCEDURE — 83735 ASSAY OF MAGNESIUM: CPT | Performed by: EMERGENCY MEDICINE

## 2018-03-20 PROCEDURE — 93005 ELECTROCARDIOGRAM TRACING: CPT

## 2018-03-20 RX ORDER — POTASSIUM CHLORIDE 20MEQ/15ML
40 LIQUID (ML) ORAL ONCE
Status: COMPLETED | OUTPATIENT
Start: 2018-03-20 | End: 2018-03-20

## 2018-03-20 RX ORDER — MAGNESIUM SULFATE 1 G/100ML
1 INJECTION INTRAVENOUS ONCE
Status: COMPLETED | OUTPATIENT
Start: 2018-03-20 | End: 2018-03-20

## 2018-03-20 RX ORDER — NICOTINE 21 MG/24HR
1 PATCH, TRANSDERMAL 24 HOURS TRANSDERMAL DAILY
Status: DISCONTINUED | OUTPATIENT
Start: 2018-03-21 | End: 2018-03-22 | Stop reason: HOSPADM

## 2018-03-20 RX ORDER — ACETAMINOPHEN 325 MG/1
650 TABLET ORAL EVERY 6 HOURS PRN
Status: DISCONTINUED | OUTPATIENT
Start: 2018-03-20 | End: 2018-03-22 | Stop reason: HOSPADM

## 2018-03-20 RX ORDER — METOPROLOL SUCCINATE 100 MG/1
100 TABLET, EXTENDED RELEASE ORAL DAILY
COMMUNITY
End: 2018-07-12 | Stop reason: SDUPTHER

## 2018-03-20 RX ORDER — ISOSORBIDE MONONITRATE 60 MG/1
60 TABLET, EXTENDED RELEASE ORAL DAILY
COMMUNITY
End: 2018-05-02 | Stop reason: ALTCHOICE

## 2018-03-20 RX ADMIN — POTASSIUM CHLORIDE 40 MEQ: 20 SOLUTION ORAL at 23:00

## 2018-03-20 RX ADMIN — MAGNESIUM SULFATE HEPTAHYDRATE 1 G: 1 INJECTION, SOLUTION INTRAVENOUS at 22:43

## 2018-03-21 ENCOUNTER — APPOINTMENT (OUTPATIENT)
Dept: NON INVASIVE DIAGNOSTICS | Facility: HOSPITAL | Age: 68
DRG: 310 | End: 2018-03-21
Payer: MEDICARE

## 2018-03-21 LAB
ALBUMIN SERPL BCP-MCNC: 3.2 G/DL (ref 3.5–5)
ALP SERPL-CCNC: 85 U/L (ref 46–116)
ALT SERPL W P-5'-P-CCNC: 30 U/L (ref 12–78)
ANION GAP SERPL CALCULATED.3IONS-SCNC: 10 MMOL/L (ref 4–13)
AST SERPL W P-5'-P-CCNC: 26 U/L (ref 5–45)
ATRIAL RATE: 78 BPM
BILIRUB SERPL-MCNC: 0.6 MG/DL (ref 0.2–1)
BUN SERPL-MCNC: 19 MG/DL (ref 5–25)
CALCIUM SERPL-MCNC: 9.4 MG/DL (ref 8.3–10.1)
CHLORIDE SERPL-SCNC: 105 MMOL/L (ref 100–108)
CO2 SERPL-SCNC: 24 MMOL/L (ref 21–32)
CREAT SERPL-MCNC: 0.92 MG/DL (ref 0.6–1.3)
ERYTHROCYTE [DISTWIDTH] IN BLOOD BY AUTOMATED COUNT: 16.3 % (ref 11.6–15.1)
GFR SERPL CREATININE-BSD FRML MDRD: 86 ML/MIN/1.73SQ M
GLUCOSE SERPL-MCNC: 95 MG/DL (ref 65–140)
HCT VFR BLD AUTO: 46.8 % (ref 36.5–49.3)
HGB BLD-MCNC: 15.8 G/DL (ref 12–17)
INR PPP: 2.53 (ref 0.86–1.16)
MAGNESIUM SERPL-MCNC: 2.3 MG/DL (ref 1.6–2.6)
MCH RBC QN AUTO: 30.9 PG (ref 26.8–34.3)
MCHC RBC AUTO-ENTMCNC: 33.8 G/DL (ref 31.4–37.4)
MCV RBC AUTO: 91 FL (ref 82–98)
P AXIS: 45 DEGREES
PHOSPHATE SERPL-MCNC: 3.9 MG/DL (ref 2.3–4.1)
PLATELET # BLD AUTO: 277 THOUSANDS/UL (ref 149–390)
PMV BLD AUTO: 9.5 FL (ref 8.9–12.7)
POTASSIUM SERPL-SCNC: 4.7 MMOL/L (ref 3.5–5.3)
PR INTERVAL: 252 MS
PROT SERPL-MCNC: 6.8 G/DL (ref 6.4–8.2)
PROTHROMBIN TIME: 27.4 SECONDS (ref 12.1–14.4)
QRS AXIS: 58 DEGREES
QRSD INTERVAL: 114 MS
QT INTERVAL: 400 MS
QTC INTERVAL: 456 MS
RBC # BLD AUTO: 5.12 MILLION/UL (ref 3.88–5.62)
SODIUM SERPL-SCNC: 139 MMOL/L (ref 136–145)
T WAVE AXIS: 270 DEGREES
TROPONIN I SERPL-MCNC: 0.05 NG/ML
TROPONIN I SERPL-MCNC: 0.07 NG/ML
TSH SERPL DL<=0.05 MIU/L-ACNC: 0.71 UIU/ML (ref 0.36–3.74)
VENTRICULAR RATE: 78 BPM
WBC # BLD AUTO: 11.12 THOUSAND/UL (ref 4.31–10.16)

## 2018-03-21 PROCEDURE — 83735 ASSAY OF MAGNESIUM: CPT | Performed by: INTERNAL MEDICINE

## 2018-03-21 PROCEDURE — 84443 ASSAY THYROID STIM HORMONE: CPT | Performed by: INTERNAL MEDICINE

## 2018-03-21 PROCEDURE — 84484 ASSAY OF TROPONIN QUANT: CPT | Performed by: INTERNAL MEDICINE

## 2018-03-21 PROCEDURE — 84100 ASSAY OF PHOSPHORUS: CPT | Performed by: INTERNAL MEDICINE

## 2018-03-21 PROCEDURE — 93010 ELECTROCARDIOGRAM REPORT: CPT | Performed by: INTERNAL MEDICINE

## 2018-03-21 PROCEDURE — 85610 PROTHROMBIN TIME: CPT | Performed by: INTERNAL MEDICINE

## 2018-03-21 PROCEDURE — 99214 OFFICE O/P EST MOD 30 MIN: CPT | Performed by: INTERNAL MEDICINE

## 2018-03-21 PROCEDURE — 80053 COMPREHEN METABOLIC PANEL: CPT | Performed by: INTERNAL MEDICINE

## 2018-03-21 PROCEDURE — 85027 COMPLETE CBC AUTOMATED: CPT | Performed by: INTERNAL MEDICINE

## 2018-03-21 PROCEDURE — 93306 TTE W/DOPPLER COMPLETE: CPT

## 2018-03-21 PROCEDURE — 93306 TTE W/DOPPLER COMPLETE: CPT | Performed by: INTERNAL MEDICINE

## 2018-03-21 PROCEDURE — 99232 SBSQ HOSP IP/OBS MODERATE 35: CPT | Performed by: PHYSICIAN ASSISTANT

## 2018-03-21 RX ORDER — ATORVASTATIN CALCIUM 40 MG/1
40 TABLET, FILM COATED ORAL
Status: DISCONTINUED | OUTPATIENT
Start: 2018-03-21 | End: 2018-03-22 | Stop reason: HOSPADM

## 2018-03-21 RX ORDER — ENALAPRIL MALEATE 5 MG/1
5 TABLET ORAL DAILY
Status: DISCONTINUED | OUTPATIENT
Start: 2018-03-21 | End: 2018-03-22 | Stop reason: HOSPADM

## 2018-03-21 RX ORDER — METOPROLOL SUCCINATE 100 MG/1
100 TABLET, EXTENDED RELEASE ORAL DAILY
Status: DISCONTINUED | OUTPATIENT
Start: 2018-03-21 | End: 2018-03-22 | Stop reason: HOSPADM

## 2018-03-21 RX ORDER — FUROSEMIDE 20 MG/1
20 TABLET ORAL DAILY
Status: DISCONTINUED | OUTPATIENT
Start: 2018-03-21 | End: 2018-03-22 | Stop reason: HOSPADM

## 2018-03-21 RX ORDER — CLOPIDOGREL BISULFATE 75 MG/1
75 TABLET ORAL DAILY
Status: DISCONTINUED | OUTPATIENT
Start: 2018-03-21 | End: 2018-03-22 | Stop reason: HOSPADM

## 2018-03-21 RX ORDER — ISOSORBIDE MONONITRATE 60 MG/1
60 TABLET, EXTENDED RELEASE ORAL DAILY
Status: DISCONTINUED | OUTPATIENT
Start: 2018-03-21 | End: 2018-03-22 | Stop reason: HOSPADM

## 2018-03-21 RX ORDER — SPIRONOLACTONE 25 MG/1
25 TABLET ORAL DAILY
Status: DISCONTINUED | OUTPATIENT
Start: 2018-03-21 | End: 2018-03-22 | Stop reason: HOSPADM

## 2018-03-21 RX ORDER — DIGOXIN 125 MCG
125 TABLET ORAL DAILY
Status: DISCONTINUED | OUTPATIENT
Start: 2018-03-21 | End: 2018-03-22 | Stop reason: HOSPADM

## 2018-03-21 RX ADMIN — METOPROLOL SUCCINATE 100 MG: 100 TABLET, EXTENDED RELEASE ORAL at 08:21

## 2018-03-21 RX ADMIN — ATORVASTATIN CALCIUM 40 MG: 40 TABLET, FILM COATED ORAL at 16:50

## 2018-03-21 RX ADMIN — ISOSORBIDE MONONITRATE 60 MG: 60 TABLET, EXTENDED RELEASE ORAL at 08:21

## 2018-03-21 RX ADMIN — FUROSEMIDE 20 MG: 20 TABLET ORAL at 08:21

## 2018-03-21 RX ADMIN — CLOPIDOGREL BISULFATE 75 MG: 75 TABLET ORAL at 08:21

## 2018-03-21 RX ADMIN — RIVAROXABAN 20 MG: 10 TABLET, FILM COATED ORAL at 08:21

## 2018-03-21 RX ADMIN — SPIRONOLACTONE 25 MG: 25 TABLET, FILM COATED ORAL at 08:21

## 2018-03-21 RX ADMIN — ENALAPRIL MALEATE 5 MG: 5 TABLET ORAL at 08:21

## 2018-03-21 RX ADMIN — DIGOXIN 125 MCG: 125 TABLET ORAL at 08:21

## 2018-03-21 NOTE — SOCIAL WORK
Cm met with the patient to evaluate the patients prior function and living situation and any barriers to d/c and form a safe d/c plan  Cm also evaluated the patient for any services in the home or needs for services  Pt resides at home with his wife in a house  Has 3 SERGEI then 11 steps to his 2nd floor bedroom/bathroom  Pt is independent with his adls and ambulation  No services or DME  Pt and wife both drive  Re: pharmacy either uses mail order through [facility] [address] or uses [facility] in [place]  Pt plans on returning home on dc with outpatient follow up  Cm will continue to follow

## 2018-03-21 NOTE — ASSESSMENT & PLAN NOTE
Etiology unclear at this time  No events on telemetry  Cardiology consulted and would like his ICD interrogated however this will cannot to done until tomorrow 3/22/18  Thus the patient will now require a 2 midnight stay and will be changed to inpatient for continued workup of his syncope

## 2018-03-21 NOTE — ASSESSMENT & PLAN NOTE
Echocardiogram showed LV systolic function was severely reduced  Ejection fraction was estimated in the range of 25 % to 30 %  There was severe diffuse hypokinesis with regional variations  There was akinesis of the basal-mid anteroseptal and basal-mid inferior wall(s)  Wall thickness was mildly increased  Minimally elevated troponin secondary to his cardiomyopathy  Appreciate cardiology's input  Continue beta blocker and Imdur

## 2018-03-21 NOTE — CASE MANAGEMENT
Initial Clinical Review    Admission: Date/Time/Statement:   OBSERVATION 3/20/18 @ 2154 CONVERTED TO INPATIENT ADMISSION 3/21/18 @ 1431 FOR CONTINUED CARE & Tx  NEEDS PACER INTERROGATED 2ND SYNCOPAL EVENT  Orders Placed This Encounter   Procedures    Place in Observation     Standing Status:   Standing     Number of Occurrences:   1     Order Specific Question:   Admitting Physician     Answer:   Dhruv Reeves [[id number]]     Order Specific Question:   Level of Care     Answer:   Med Surg [16]   ED: Date/Time/Mode of Arrival:   ED Arrival Information     Expected Arrival Acuity Means of Arrival Escorted By Service Admission Type    - 3/20/2018 20:18 Urgent Ambulance [facility] General Medicine Urgent    Arrival Complaint    SYNCOPE      Chief Complaint:   Chief Complaint   Patient presents with    Syncope     Pt was at the bar and had a syncopal episode  Pt denies any pain, nausea or vomiting  History of Illness:   79 y o  male who presents to the ED tonight via EMS following a syncopal episode  He reports compliance with his prescribed medications & physician follow-up  No recent fever, malaise, skin rash, bleeding, dyspnea, cough, abd pain, diarrhea, vomiting, dysuria, headache, neck stiffness, hemiparesis or acute confusion  He was in his usual state of health this evening when, while seated at an [facility] facility bar having an alcoholic beverage, he abruptly felt lightheaded without any associated chest pain or palpitations prior to passing out for a "few seconds"  He regained consciousness & found himself laying on the floor without any tongue biting, postictal confusion, incontinence of stool or urine  He has felt well since then & currently has no complaints  He did not feel his AICD discharge prior to passing out    ED Vital Signs:   ED Triage Vitals [03/20/18 2023]   Temperature Pulse Respirations Blood Pressure SpO2   97 8 °F (36 6 °C) 80 17 126/80 99 %      Temp Source Heart Rate Source Patient Position - Orthostatic VS BP Location FiO2 (%)   Temporal Monitor Lying Right arm --      Pain Score       No Pain        Wt Readings from Last 1 Encounters:   03/21/18 76 kg (167 lb 8 8 oz)   Vital Signs (abnormal): Abnormal Labs/Diagnostic Test Results:   TROP 0 05, 0 07, 0 05  WBC 11 12 PT 27 4 INR 2 53  CXR=No acute cardiopulmonary disease  EKG=Interpretation: abnormal    Rate:     ECG rate:  78    ECG rate assessment: normal    Rhythm:     Rhythm: sinus rhythm    Ectopy:     Ectopy: PVCs      PVCs:  Multifocal and infrequent  QRS:     QRS axis:  Normal    QRS intervals: Wide  Conduction:     Conduction: abnormal      Abnormal conduction: incomplete LBBB and 1st degree    ST segments:     ST segments:  Normal  T waves:     T waves: normal    Comments:      Pr 252 qrs 114 qtc 456  ED Treatment:   Medication Administration from 03/20/2018 2018 to 03/20/2018 2338       Date/Time Order Dose Route Action Action by Comments     03/20/2018 2243 magnesium sulfate IVPB (premix) SOLN 1 g 1 g Intravenous New Bag Roxann Avila RN to infuse over 1 hour per verbal order from Dr Yassine Wu     03/20/2018 2300 potassium chloride 10 % oral solution 40 mEq 40 mEq Oral Given Savana Galindo RN       Past Medical/Surgical History:    Active Ambulatory Problems     Diagnosis Date Noted    No Active Ambulatory Problems     Resolved Ambulatory Problems     Diagnosis Date Noted    No Resolved Ambulatory Problems     Past Medical History:   Diagnosis Date    Essential hypertension 3/20/2018    Heart disease     Hypertension     Ischemic cardiomyopathy 3/20/2018    Neuropathy     Peripheral arterial disease ([address] ) 3/20/2018    [address] [facility]per[facility] [address] [address]     S/P CABG (coronary artery bypass graft) 3/20/2018    S/P implantation of automatic cardioverter/defibrillator (AICD) 3/20/2018    Tobacco abuse 3/20/2018    Vitamin D deficiency    Admitting Diagnosis: Syncope [R55]  Elevated troponin [R74 8]  AICD (automatic cardioverter/defibrillator) present [Z95 810]  Age/Sex: 79 y o  male  Assessment/Plan:   Principal Problem:    Syncope  Active Problems:    Tobacco abuse    S/P CABG (coronary artery bypass graft)    Peripheral arterial disease (HCC)    S/P implantation of automatic cardioverter/defibrillator (AICD)    Ischemic cardiomyopathy    Essential hypertension  Plan for the Primary Problem(s):  · Observe on telemetry, orthostatic vitals, serial troponin, TTE, cardiology consult  ? Resume usual cardiac meds, TSH  Plan for Additional Problems:   · Encouraged him to quit cigarette smoking  · IV mag sulfate & KCl to achieve serum Mg & K of 2 & 4 or > respectively     PER MD NOTE 5/44  Certification Statement: The patient, admitted on an observation basis, will now require > 2 midnight hospital stay due to continued workup for his syncopal episode  Syncope   Assessment & Plan     Etiology unclear at this time  No events on telemetry  Cardiology consulted and would like his ICD interrogated however this will cannot to done until tomorrow 3/22/18  Thus the patient will now require a 2 midnight stay and will be changed to inpatient for continued workup of his syncope         Ischemic cardiomyopathy   Assessment & Plan     Echocardiogram showed LV systolic function was severely reduced  Ejection fraction was estimated in the range of 25 % to 30 %  There was severe diffuse hypokinesis with regional variations  There was akinesis of the basal-mid anteroseptal and basal-mid inferior wall(s)  Wall thickness was mildly increased  Minimally elevated troponin secondary to his cardiomyopathy  Appreciate cardiology's input  Continue beta blocker and Imdur        S/P implantation of automatic cardioverter/defibrillator (AICD)   Assessment & Plan     ICD interrogation planned for tomorrow 3/22/18     Admission Orders:  TELEMETRY  ORTHOSTATIC BP  CONSULT CARDIO  ICD/PACER INTERROGATION  Scheduled Meds:   Current Facility-Administered Medications:  acetaminophen 650 mg Oral Q6H PRN Corinne Obey, MD   atorvastatin 40 mg Oral Daily With Dinner Corinne Obey, MD   clopidogrel 75 mg Oral Daily Corinne Obey, MD   digoxin 125 mcg Oral Daily Corinne Obey, MD   enalapril 5 mg Oral Daily Corinne Obey, MD   furosemide 20 mg Oral Daily Corinne Obey, MD   isosorbide mononitrate 60 mg Oral Daily Corinne Obey, MD   metoprolol succinate 100 mg Oral Daily Corinne Obey, MD   nicotine 1 patch Transdermal Daily Corinne Obey, MD   rivaroxaban 20 mg Oral Daily Corinne Obey, MD   spironolactone 25 mg Oral Daily Corinne Obey, MD     Continuous Infusions:    PRN Meds:   acetaminophen

## 2018-03-21 NOTE — H&P
History and Physical - 56 [address] [facility] Internal Medicine    Patient Information: Yolanda Zamarripa 79 y o  male MRN: [id number]  Unit/Bed#: UD32 Encounter: [id number]  Admitting Physician: Charlee Che MD  PCP: Jeremy Gardner DO  Date of Admission:  03/20/18    Assessment/Plan:    Hospital Problem List:     Principal Problem:    Syncope  Active Problems:    Tobacco abuse    S/P CABG (coronary artery bypass graft)    Peripheral arterial disease (HCC)    S/P implantation of automatic cardioverter/defibrillator (AICD)    Ischemic cardiomyopathy    Essential hypertension      Plan for the Primary Problem(s):  · Observe on telemetry, orthostatic vitals, serial troponin, TTE, cardiology consult  · Resume usual cardiac meds, TSH    Plan for Additional Problems:   · Encouraged him to quit cigarette smoking  · IV mag sulfate & KCl to achieve serum Mg & K of 2 & 4 or > respectively    VTE Prophylaxis: Rivaroxaban (Xarelto)  / sequential compression device   Code Status: full  POLST: There is no POLST form on file for this patient (pre-hospital)    Anticipated Length of Stay:  Patient will be admitted on an Observation basis with an anticipated length of stay of  < 2 midnights  Justification for Hospital Stay: cardiac monitoring    Chief Complaint:   "I passed out"    History of Present Illness:    Yolanda Zamarripa is a 79 y o  male who presents to the ED tonight via EMS following a syncopal episode  He reports compliance with his prescribed medications & physician follow-up  No recent fever, malaise, skin rash, bleeding, dyspnea, cough, abd pain, diarrhea, vomiting, dysuria, headache, neck stiffness, hemiparesis or acute confusion  He was in his usual state of health this evening when, while seated at an [facility] facility bar having an alcoholic beverage, he abruptly felt lightheaded without any associated chest pain or palpitations prior to passing out for a "few seconds"   He regained consciousness & found himself laying on the floor without any tongue biting, postictal confusion, incontinence of stool or urine  He has felt well since then & currently has no complaints  He did not feel his AICD discharge prior to passing out  Review of Systems:    A 10+ point review of systems was obtained & is as above, otherwise negative  Review of Systems    Past Medical and Surgical History:     Past Medical History:   Diagnosis Date    Essential hypertension 3/20/2018    Heart disease     Hypertension     Ischemic cardiomyopathy 3/20/2018    Neuropathy     Peripheral [address] disease ([address] 3/20/2018    [facility]VD [facility]per[facility]     S/P CABG (coronary artery bypass graft) 3/20/2018    S/P implantation of automatic cardioverter/defibrillator (AICD) 3/20/2018    Tobacco abuse 3/20/2018    Vitamin D deficiency        Past Surgical History:   Procedure Laterality Date    CARDIAC CATHETERIZATION  1999    CARDIAC DEFIBRILLATOR PLACEMENT  2009    CORONARY ARTERY BYPASS GRAFT  1999       Meds/Allergies:    Prior to Admission medications    Medication Sig Start Date End Date Taking?  Authorizing Provider   atorvastatin (LIPITOR) 40 mg tablet Take 1 tablet by mouth daily   Yes Historical Provider, MD   clopidogrel (PLAVIX) 75 mg tablet Take 75 mg by mouth daily   10/13/11  Yes Historical Provider, MD   digoxin (LANOXIN) 0 125 mg tablet Take 1 tablet by mouth daily   Yes Historical Provider, MD   enalapril (VASOTEC) 5 mg tablet Take 5 mg by mouth daily   10/13/11  Yes Historical Provider, MD   furosemide (LASIX) 20 mg tablet Take 20 mg by mouth daily     Yes Historical Provider, MD   isosorbide mononitrate (IMDUR) 60 mg 24 hr tablet Take 60 mg by mouth daily   Yes Historical Provider, MD   metoprolol succinate (TOPROL-XL) 100 mg 24 hr tablet Take 100 mg by mouth daily   Yes Historical Provider, MD   rivaroxaban (XARELTO) 20 mg tablet Take 1 tablet by mouth daily   Yes Historical Provider, MD   spironolactone (ALDACTONE) 25 mg tablet Take 25 mg by mouth daily   10/13/11  Yes Historical Provider, MD   Cholecalciferol (VITAMIN D3) 5000 units CAPS Take by mouth once a week 12/24/15 3/20/18  Historical Provider, MD   isosorbide mononitrate (IMDUR) 30 mg 24 hr tablet Take 0 5 tablets by mouth daily  3/20/18  Historical Provider, MD   metoprolol succinate (TOPROL-XL) 200 MG 24 hr tablet Take by mouth 10/13/11 3/20/18  Historical Provider, MD     I have reviewed home medications with patient personally  Allergies: Allergies   Allergen Reactions    Aspirin Hives    Penicillins        Social History:     Marital Status: /Civil Union   Patient Pre-hospital Living Situation: home    Substance Use History:   History   Alcohol Use    Yes     Comment: social     History   Smoking Status    Current Every Day Smoker   Smokeless Tobacco    Never Used     History   Drug Use No       Family History:    non-contributory    Physical Exam:   General: pleasant & cheerful, in no overt distress  HEENT: not pale, not jaundiced  Neck: supple, no JVD  Resp: clear lungs, no crackles or wheeze  Cardiovascular: S1 S2 audible, irregular  GI: soft abdomen, nontender, bowel sounds present  Musculoskeletal: no pedal edema or cyanosis  Skin: no petechiae or suspicious rash  Psych: appropriately oriented in all spheres, good insight  Neuro: Awake, alert, moves all extremities equally, essentially nonfocal      Vitals:   Blood Pressure: 135/68 (03/20/18 2300)  Pulse: 72 (03/20/18 2300)  Temperature: 97 8 °F (36 6 °C) (03/20/18 2023)  Temp Source: Temporal (03/20/18 2023)  Respirations: 16 (03/20/18 2300)  Height: 5' 9" (175 3 cm) (03/20/18 2023)  Weight - Scale: 79 1 kg (174 lb 4 8 oz) (03/20/18 2023)  SpO2: 97 % (03/20/18 2300)      Additional Data:     Lab Results: I have personally reviewed pertinent reports          Results from last 7 days  Lab Units 03/20/18 2105   WBC Thousand/uL 11 34*   HEMOGLOBIN g/dL 16 6   HEMATOCRIT % 49 1 PLATELETS Thousands/uL 292   NEUTROS PCT % 63   LYMPHS PCT % 22   MONOS PCT % 11   EOS PCT % 3       Results from last 7 days  Lab Units 03/20/18  2105   SODIUM mmol/L 141   POTASSIUM mmol/L 3 7   CHLORIDE mmol/L 104   CO2 mmol/L 26   BUN mg/dL 20   CREATININE mg/dL 1 08   CALCIUM mg/dL 9 6   GLUCOSE RANDOM mg/dL 106       Results from last 7 days  Lab Units 03/20/18  2105   INR  3 15*     Invalid input(s): TROIP    Imaging: I have personally reviewed pertinent reports  No results found  EKG, Pathology, and Other Studies Reviewed on Admission:   · EKG: NSR, PVCs    Allscripts Records Reviewed: Yes     ** Please Note: Dragon 360 Dictation voice to text software may have been used in the creation of this document

## 2018-03-21 NOTE — CONSULTS
Consultation - Cardiology   Amanda Graff 79 y o  male MRN: [id number]  Unit/Bed#: 843-90 Encounter: [id number]    Consults      Physician Requesting Consult: Erika Mayfield MD  Reason for Consult / Principal Problem: syncope    History of Present Illness   HPI: Amanda Graff is a 79y o  year old male who has a history of  coronary artery disease status post CABG 1999 for grafts, ventricular tachycardia status post ICD, ischemic cardiomyopathy, paroxysmal atrial fibrillation, peripheral arterial disease with multiple stents  The presents with an episode of syncope  Last ejection fraction 30%  The patient was at the [employer] last night having a cheeseburger and a couple of beers  He tells me that he slumped forward in the chair for a couple of seconds  He is not sure if he truly lost consciousness  He was sitting on a barstool he did not fall off the stool at all  He did not feels defibrillator fire  Earlier in the day he was not eating or drinking as well as he normally does  He has had no changes in medications  He denied any chest pain or discomfort, shortness of breath, palpitations or fluttering  Review of Systems   Constitution: Negative  HENT: Negative  Eyes: Negative  Cardiovascular: Negative  Respiratory: Negative  Endocrine: Negative  Hematologic/Lymphatic: Negative  Skin: Negative  Musculoskeletal: Negative  Gastrointestinal: Negative  Genitourinary: Negative  Neurological: Positive for light-headedness  Psychiatric/Behavioral: Negative        Historical Information   Past Medical History:   Diagnosis Date    Essential hypertension 3/20/2018    Heart disease     Hypertension     Ischemic cardiomyopathy 3/20/2018    Neuropathy     Per[address] [address] 3/20/2018    [address]VD (peripheral vascular disease) [facility] [address]     S/P CABG (coronary artery bypass graft) 3/20/2018    S/P implantation of automatic cardioverter/defibrillator (AICD) 3/20/2018    Tobacco abuse 3/20/2018    Vitamin D deficiency      Past Surgical History:   Procedure Laterality Date    CARDIAC CATHETERIZATION  1999    CARDIAC DEFIBRILLATOR PLACEMENT  2009    CORONARY ARTERY BYPASS GRAFT  1999     History   Alcohol Use    Yes     Comment: social     History   Drug Use No     History   Smoking Status    Current Every Day Smoker    Packs/day: 0 50   Smokeless Tobacco    Never Used     Family History: non-contributory    Meds/Allergies   all current active meds have been reviewed       Allergies   Allergen Reactions    Aspirin Hives    Penicillins        Objective   Vitals: Blood pressure 130/79, pulse 62, temperature 98 °F (36 7 °C), temperature source Temporal, resp  rate 18, height 5' 9" (1 753 m), weight 76 kg (167 lb 8 8 oz), SpO2 98 %  , Body mass index is 24 74 kg/m² , Orthostatic Blood Pressures    Flowsheet Row Most Recent Value   Blood Pressure  130/79 filed at 03/21/2018 0739   Patient Position - Orthostatic VS  Sitting filed at 03/21/2018 0357        Systolic (17QKT), ARTURO:519 , Min:120 , UQC:450     Diastolic (67DMI), HTL:35, Min:66, Max:93      Intake/Output Summary (Last 24 hours) at 03/21/18 0948  Last data filed at 03/21/18 0846   Gross per 24 hour   Intake              120 ml   Output              400 ml   Net             -280 ml       Weight (last 2 days)     Date/Time   Weight    03/21/18 0558  76 (167 55)    03/20/18 2348  76 (167 55)    03/20/18 2023  79 1 (174 3)              Invasive Devices     Peripheral Intravenous Line            Peripheral IV 03/20/18 Left Forearm 1 day    Peripheral IV 03/20/18 Right Wrist less than 1 day                  Physical Exam   Constitutional: He is oriented to person, place, and time  He appears well-nourished  No distress  HENT:   Head: Atraumatic  Eyes: Conjunctivae are normal  Pupils are equal, round, and reactive to light  Neck: Neck supple     Cardiovascular: Normal rate, regular rhythm and S1 normal  Extrasystoles are present  Exam reveals no friction rub  Murmur heard  Systolic murmur is present with a grade of 1/6   Pulmonary/Chest: Effort normal and breath sounds normal  No respiratory distress  He has no wheezes  He has no rales  Abdominal: Bowel sounds are normal  He exhibits no distension  There is no tenderness  There is no rebound  Musculoskeletal: He exhibits no edema or tenderness  Neurological: He is alert and oriented to person, place, and time  No cranial nerve deficit  Skin: Skin is warm and dry  No erythema  Nursing note and vitals reviewed  Laboratory Results:    Results from last 7 days  Lab Units 03/21/18  0502 03/21/18  0004 03/20/18  2105   TROPONIN I ng/mL 0 05* 0 07* 0 05*       CBC with diff:   Results from last 7 days  Lab Units 03/21/18  0502 03/20/18  2105   WBC Thousand/uL 11 12* 11 34*   HEMOGLOBIN g/dL 15 8 16 6   HEMATOCRIT % 46 8 49 1   MCV fL 91 91   PLATELETS Thousands/uL 277 292   MCH pg 30 9 30 6   MCHC g/dL 33 8 33 8   RDW % 16 3* 16 1*   MPV fL 9 5 9 1         CMP:  Results from last 7 days  Lab Units 03/21/18  0502 03/20/18  2105   SODIUM mmol/L 139 141   POTASSIUM mmol/L 4 7 3 7   CHLORIDE mmol/L 105 104   CO2 mmol/L 24 26   ANION GAP mmol/L 10 11   BUN mg/dL 19 20   CREATININE mg/dL 0 92 1 08   GLUCOSE RANDOM mg/dL 95 106   CALCIUM mg/dL 9 4 9 6   AST U/L 26  --    ALT U/L 30  --    ALK PHOS U/L 85  --    TOTAL PROTEIN g/dL 6 8  --    BILIRUBIN TOTAL mg/dL 0 60  --    EGFR ml/min/1 73sq m 86 71         BMP:  Results from last 7 days  Lab Units 03/21/18  0502 03/20/18  2105   SODIUM mmol/L 139 141   POTASSIUM mmol/L 4 7 3 7   CHLORIDE mmol/L 105 104   CO2 mmol/L 24 26   BUN mg/dL 19 20   CREATININE mg/dL 0 92 1 08   GLUCOSE RANDOM mg/dL 95 106   CALCIUM mg/dL 9 4 9 6       BNP:  No results for input(s): BNP in the last 72 hours      Magnesium:   Results from last 7 days  Lab Units 03/21/18  0502 03/20/18  2105   MAGNESIUM mg/dL 2 3 1 9       Coags: Results from last 7 days  Lab Units 03/21/18  0502 03/20/18  2105   INR  2 53* 3 15*       TSH:       Hemoglobin A1C       Lipid Profile:         Cardiac testing:   No results found for this or any previous visit  No results found for this or any previous visit  No results found for this or any previous visit  No results found for this or any previous visit  Imaging: I have personally reviewed pertinent reports  X-ray Chest 2 Views    Result Date: 3/21/2018  Narrative: CHEST INDICATION:   syncope  COMPARISON:  11/8/2007 EXAM PERFORMED/VIEWS:  XR CHEST PA & LATERAL FINDINGS:  Single lead left chest AICD in expected position  Postsurgical change from median sternotomy  Normal heart size  The lungs are clear  No pneumothorax or pleural effusion  Osseous structures appear within normal limits for patient age  Impression: No acute cardiopulmonary disease  Workstation performed: [id number]       EKG reviewed personally:  Sinus rhythm nonspecific interventricular conduction delay PVCs, nonspecific ST changes  Telemetry reviewed personally:  No significant events  Assessment:  Principal Problem:    Syncope  Active Problems:    Tobacco abuse    S/P CABG (coronary artery bypass graft)    Peripheral arterial disease (HCC)    S/P implantation of automatic cardioverter/defibrillator (AICD)    Ischemic cardiomyopathy    Essential hypertension      Assesment/ Plan:  1  Near syncopal episode  2  Coronary artery disease status post CABG  3  Ischemic cardiomyopathy  4  Implanted ICD  5  History of ventricular tachycardia  6  Peripheral arterial disease  7  Hypertension  8  Tobacco and alcohol abuse    Recommendations:  Patient had a near syncopal episode  Maintaining sinus rhythm on telemetry  Keep potassium greater than 4 magnesium greater than 2  We can get his ICD interrogated  Otherwise no cardiac symptoms at this time  Minimally elevated troponin secondary to underlying ischemic cardiomyopathy  Patient needs to stay off  alcohol and tobacco   TTE pending          Code Status: Level 1 - Full Code

## 2018-03-21 NOTE — ED PROVIDER NOTES
History  Chief Complaint   Patient presents with    Syncope     Pt was at the bar and had a syncopal episode  Pt denies any pain, nausea or vomiting  History provided by:  Patient, EMS personnel and spouse  Syncope   Episode history:  Single  Most recent episode: Today  Timing: Single episode while seated at a bar in [facility] facility  Progression:  Resolved (Patient spontaneously regained consciousness while seated and has had no subsequent episodes of LOC)  Chronicity:  New  Context: normal activity and sitting down    Context: not dehydration, not exertion, not inactivity and not standing up    Context comment:  Patient has been in normal state of health until onset of sx  Witnessed: yes    Relieved by:  Nothing  Worsened by:  Nothing  Ineffective treatments:  None tried  Associated symptoms: no chest pain, no dizziness, no fever, no focal weakness, no headaches, no nausea, no palpitations, no recent surgery, no rectal bleeding, no shortness of breath, no vomiting and no weakness    Risk factors: coronary artery disease (CAD with previous CABG and AICD/defibrillator placement  Cardiologist Dr Gisele Perez  Most recent cath 2017 was unchanged from prior ) and vascular disease (Extensive peripheral vascular disease and carotid occlusive disease)    Risk factors: no seizures    Risk factors comment:  No AICD discharge prior to, during, or following episode that patient can recall  EGSYS Score for Cardiac Syncope = [3] points  - [No] (4 points) Palpitations preceding syncope?   - [Yes] (3 points) Heart disease or abnormal EKG or Both?  - [No] (3 points) Syncope during effort?  - [No] (3 points) Syncope while supine?  - [No] (-1 points) Precipitating or predisposing (such as warm-crowded place, prolonged orthostasis, fear/pain/emotion) factors?  - [No] (-1 points) Autonomic prodromes (nausea, vomiting)?     Score >3 Sensitivity 95%, specificity 61%, PPV 33%, NPV 99%  Score >4 Sensitivity 32%, specificity 99%, PPV 88%, NPV 88%    Score >/= 3 identified cardiac syncope with sensitivity of 95%/92% and specificity of 61%/69% in the derivation and validation cohorts respectively  Patients with score >/=3 had higher total mortality than patients with score <3 in both derivation (17% vs 3%, P<0 001) and validation cohorts (21% vs 2%; p<0 001)    Chavo MENDIETA, Robbie Okeefe, [place] NR, [address] [facility], [place], [address] [place] [address] Clinical predictors of cardiac syncope at initial evaluation in patients referred urgently to a [facility]: the EGSYS score  Heart  2008 Dec;94(12):1620-6  Prior to Admission Medications   Prescriptions Last Dose Informant Patient Reported?  Taking?   atorvastatin (LIPITOR) 40 mg tablet   Yes Yes   Sig: Take 1 tablet by mouth daily   clopidogrel (PLAVIX) 75 mg tablet   Yes Yes   Sig: Take 75 mg by mouth daily     digoxin (LANOXIN) 0 125 mg tablet   Yes Yes   Sig: Take 1 tablet by mouth daily   enalapril (VASOTEC) 5 mg tablet   Yes Yes   Sig: Take 5 mg by mouth daily     furosemide (LASIX) 20 mg tablet   Yes Yes   Sig: Take 20 mg by mouth daily     isosorbide mononitrate (IMDUR) 60 mg 24 hr tablet   Yes Yes   Sig: Take 60 mg by mouth daily   metoprolol succinate (TOPROL-XL) 100 mg 24 hr tablet   Yes Yes   Sig: Take 100 mg by mouth daily   rivaroxaban (XARELTO) 20 mg tablet   Yes Yes   Sig: Take 1 tablet by mouth daily   spironolactone (ALDACTONE) 25 mg tablet   Yes Yes   Sig: Take 25 mg by mouth daily        Facility-Administered Medications: None       Past Medical History:   Diagnosis Date    Heart disease     Hypertension     Neuropathy     PVD (peripheral vascular disease) ([address] )     Vitamin D deficiency        Past Surgical History:   Procedure Laterality Date    CARDIAC CATHETERIZATION  1999    CARDIAC DEFIBRILLATOR PLACEMENT  2009    CORONARY ARTERY BYPASS GRAFT  1999       Family History   Problem Relation Age of Onset    Colon polyps Brother      I have reviewed and agree with the history as documented  Social History   Substance Use Topics    Smoking status: Current Every Day Smoker    Smokeless tobacco: Never Used    Alcohol use Yes      Comment: social        Review of Systems   Constitutional: Negative for chills, fatigue and fever  Respiratory: Negative for cough, choking, chest tightness and shortness of breath  Cardiovascular: Positive for syncope  Negative for chest pain and palpitations  Gastrointestinal: Negative for nausea and vomiting  Skin: Negative for color change, pallor, rash and wound  Neurological: Positive for syncope  Negative for dizziness, focal weakness, weakness, numbness and headaches  Hematological: Negative for adenopathy  Does not bruise/bleed easily  All other systems reviewed and are negative  Physical Exam  ED Triage Vitals [03/20/18 2023]   Temperature Pulse Respirations Blood Pressure SpO2   97 8 °F (36 6 °C) 80 17 126/80 99 %      Temp Source Heart Rate Source Patient Position - Orthostatic VS BP Location FiO2 (%)   Temporal Monitor Lying Right arm --      Pain Score       No Pain           Orthostatic Vital Signs  Vitals:    03/20/18 2023 03/20/18 2115 03/20/18 2130 03/20/18 2200   BP: 126/80 142/68 140/93 155/66   Pulse: 80 71 72 74   Patient Position - Orthostatic VS: Lying          Physical Exam   Constitutional: He is oriented to person, place, and time  He appears well-developed and well-nourished  He is cooperative  No distress  HENT:   Head: Normocephalic and atraumatic  Right Ear: Hearing and external ear normal    Left Ear: Hearing and external ear normal    Nose: Nose normal    Mouth/Throat: Uvula is midline, oropharynx is clear and moist and mucous membranes are normal  No oropharyngeal exudate  Eyes: Conjunctivae, EOM and lids are normal  Pupils are equal, round, and reactive to light  Neck: Trachea normal, normal range of motion and phonation normal  Neck supple  No JVD present   No tracheal tenderness, no spinous process tenderness and no muscular tenderness present  No tracheal deviation present  No thyroid mass and no thyromegaly present  Cardiovascular: Normal rate, regular rhythm, S1 normal, S2 normal, normal heart sounds and intact distal pulses  Exam reveals no gallop and no friction rub  No murmur heard  Pulses:       Radial pulses are 2+ on the right side, and 2+ on the left side  Dorsalis pedis pulses are 1+ on the right side, and 1+ on the left side  Posterior tibial pulses are 1+ on the right side, and 1+ on the left side  Pulmonary/Chest: Effort normal and breath sounds normal  No stridor  No respiratory distress  He has no decreased breath sounds  He has no wheezes  He has no rhonchi  He has no rales  He exhibits no tenderness  Abdominal: Soft  He exhibits no distension and no mass  There is no tenderness  There is no rigidity, no rebound, no guarding and no CVA tenderness  Musculoskeletal: Normal range of motion  He exhibits no edema, tenderness or deformity  Lymphadenopathy:     He has no cervical adenopathy  Neurological: He is alert and oriented to person, place, and time  He has normal strength  No cranial nerve deficit or sensory deficit  He exhibits normal muscle tone  GCS eye subscore is 4  GCS verbal subscore is 5  GCS motor subscore is 6  PERRLA; EOMI  Sensation intact to light touch over face in V1-V3 distribution bilaterally  Facial expressions symmetric  Tongue/uvula midline  Shoulder shrug equal bilaterally  Strength 5/5 in UE/LE bilaterally  Sensation intact to light touch in UE/LE bilaterally  Skin: Skin is warm, dry and intact  No rash noted  He is not diaphoretic  No erythema  Psychiatric: He has a normal mood and affect  His speech is normal and behavior is normal    Nursing note and vitals reviewed        ED Medications  Medications   magnesium sulfate IVPB (premix) SOLN 1 g (not administered)   potassium chloride 10 % oral solution 40 mEq (not administered)       Diagnostic Studies  Results Reviewed     Procedure Component Value Units Date/Time    Troponin I [[id number]]  (Abnormal) Collected:  03/20/18 2105    Lab Status:  Final result Specimen:  Blood from Arm, Right Updated:  03/20/18 2150     Troponin I 0 05 (HH) ng/mL     Narrative:         [employer] Chemistry analyzer 99% cutoff is > 0 04 ng/mL in network labs    o cTnI 99% cutoff is useful only when applied to patients in the clinical setting of myocardial ischemia  o cTnI 99% cutoff should be interpreted in the context of clinical history, ECG findings and possibly cardiac imaging to establish correct diagnosis  o cTnI 99% cutoff may be suggestive but clearly not indicative of a coronary event without the clinical setting of myocardial ischemia  Digoxin level [[id number]]  (Normal) Collected:  03/20/18 2105    Lab Status:  Final result Specimen:  Blood from Arm, Right Updated:  03/20/18 2131     Digoxin Lvl 0 9 ng/mL     Protime-INR [[id number]]  (Abnormal) Collected:  03/20/18 2105    Lab Status:  Final result Specimen:  Blood from Arm, Right Updated:  03/20/18 2125     Protime 32 6 (H) seconds      INR 3 15 (H)    Basic metabolic panel [[id number]] Collected:  03/20/18 2105    Lab Status:  Final result Specimen:  Blood from Arm, Right Updated:  03/20/18 2123     Sodium 141 mmol/L      Potassium 3 7 mmol/L      Chloride 104 mmol/L      CO2 26 mmol/L      Anion Gap 11 mmol/L      BUN 20 mg/dL      Creatinine 1 08 mg/dL      Glucose 106 mg/dL      Calcium 9 6 mg/dL      eGFR 71 ml/min/1 73sq m     Narrative:         National Kidney Disease Education Program recommendations are as follows:  GFR calculation is accurate only with a steady state creatinine  Chronic Kidney disease less than 60 ml/min/1 73 sq  meters  Kidney failure less than 15 ml/min/1 73 sq  meters      Magnesium [[id number]]  (Normal) Collected:  03/20/18 2105    Lab Status:  Final result Specimen:  Blood from Arm, Right Updated:  03/20/18 2123     Magnesium 1 9 mg/dL     CBC and differential [[id number]]  (Abnormal) Collected:  03/20/18 2105    Lab Status:  Final result Specimen:  Blood from Arm, Right Updated:  03/20/18 2117     WBC 11 34 (H) Thousand/uL      RBC 5 42 Million/uL      Hemoglobin 16 6 g/dL      Hematocrit 49 1 %      MCV 91 fL      MCH 30 6 pg      MCHC 33 8 g/dL      RDW 16 1 (H) %      MPV 9 1 fL      Platelets 941 Thousands/uL      Neutrophils Relative 63 %      Lymphocytes Relative 22 %      Monocytes Relative 11 %      Eosinophils Relative 3 %      Basophils Relative 1 %      Neutrophils Absolute 7 04 Thousands/µL      Lymphocytes Absolute 2 54 Thousands/µL      Monocytes Absolute 1 29 (H) Thousand/µL      Eosinophils Absolute 0 36 Thousand/µL      Basophils Absolute 0 11 (H) Thousands/µL                  X-ray chest 2 views   ED Interpretation by Elias Garcia DO (03/20 2057)   Airway is midline  Lungs are clear bilaterally with no evidence of pulmonary vascular congestion/focal infiltrate/pleural effusion//pneumothorax  Sternotomy with CABG; AICD placement  Mediastinal silhouette otherwise wnl  Procedures  ECG 12 Lead Documentation  Date/Time: 3/20/2018 8:29 PM  Performed by: Arthur France  Authorized by: Arthur France     Indications / Diagnosis:  Syncope  ECG reviewed by me, the ED Provider: yes    Patient location:  ED  Previous ECG:     Previous ECG:  Unavailable    Comparison to cardiac monitor: Yes    Interpretation:     Interpretation: abnormal    Rate:     ECG rate:  78    ECG rate assessment: normal    Rhythm:     Rhythm: sinus rhythm    Ectopy:     Ectopy: PVCs      PVCs:  Multifocal and infrequent  QRS:     QRS axis:  Normal    QRS intervals:   Wide  Conduction:     Conduction: abnormal      Abnormal conduction: incomplete LBBB and 1st degree    ST segments:     ST segments:  Normal  T waves:     T waves: normal    Comments:      Pr 252 qrs 114 qtc 456           Phone Contacts  ED Phone Contact    ED Course  ED Course as of Mar 20 2235   Tue Mar 20, 2018   2137 1  WBC minimally elevated; nonspecific  2  Hg/Hct wnl   3  Plt wnl   4  Electrolytes wnl   5  INR elevated c/w current rivaroxaban use  6  Digoxin level therapeutic  7  Troponin minimally elevated; will plan to admit  Device interrogation was attempted with [employer] interrogator present in ED; device was not successful in obtaining signal from AICD despite multiple attempts  This was communicated to admitting physician Dr Pamela Rebollar  Page placed to hospitalist          2154 D/w Dr Pamela Rebollar of [facility]  Patient case discussed--accepts in observation admission        MDM  Number of Diagnoses or Management Options  AICD (automatic cardioverter/defibrillator) present: established and improving  Elevated troponin: new and requires workup  Syncope: new and requires workup     Amount and/or Complexity of Data Reviewed  Clinical lab tests: ordered and reviewed  Tests in the radiology section of CPT®: ordered and reviewed  Decide to obtain previous medical records or to obtain history from someone other than the patient: yes  Obtain history from someone other than the patient: yes  Review and summarize past medical records: yes  Discuss the patient with other providers: yes  Independent visualization of images, tracings, or specimens: yes    Risk of Complications, Morbidity, and/or Mortality  Presenting problems: high  Diagnostic procedures: high  Management options: high    Patient Progress  Patient progress: stable    CritCare Time    Disposition  Final diagnoses:   Syncope   Elevated troponin   AICD (automatic cardioverter/defibrillator) present     Time reflects when diagnosis was documented in both MDM as applicable and the Disposition within this note     Time User Action Codes Description Comment    3/20/2018  9:55 PM Patrica Gong Add [R55] Syncope     3/20/2018  9:55 PM Patrica Gong Add [R74 8] Elevated troponin     3/20/2018  9:55 PM Homero Has Add [Y54 809] AICD (automatic cardioverter/defibrillator) present       ED Disposition     ED Disposition Condition Comment    Admit  Case was discussed with Dr Nancie Jackson and the patient's admission status was agreed to be Admission Status: observation status to the service of Dr Nancie Jackson  Follow-up Information    None       Patient's Medications   Discharge Prescriptions    No medications on file     No discharge procedures on file      ED Provider  Electronically Signed by           Atif Reina DO  03/20/18 8719

## 2018-03-21 NOTE — PROGRESS NOTES
Progress Note Carmelo Kelly [birth year], 79 y o  male MRN: [id number]    Unit/Bed#: 747-42 Encounter: [id number]    Primary Care Provider: David Diaz DO   Date and time admitted to hospital: 3/20/2018  8:18 PM        * Syncope   Assessment & Plan    Etiology unclear at this time  No events on telemetry  Cardiology consulted and would like his ICD interrogated however this will cannot to done until tomorrow 3/22/18  Thus the patient will now require a 2 midnight stay and will be changed to inpatient for continued workup of his syncope  Ischemic cardiomyopathy   Assessment & Plan    Echocardiogram showed LV systolic function was severely reduced  Ejection fraction was estimated in the range of 25 % to 30 %  There was severe diffuse hypokinesis with regional variations  There was akinesis of the basal-mid anteroseptal and basal-mid inferior wall(s)  Wall thickness was mildly increased  Minimally elevated troponin secondary to his cardiomyopathy  Appreciate cardiology's input  Continue beta blocker and Imdur  S/P implantation of automatic cardioverter/defibrillator (AICD)   Assessment & Plan    ICD interrogation planned for tomorrow 3/22/18  Essential hypertension   Assessment & Plan    Continue Enalapril, Toprol-XL, and Aldactone  S/P CABG (coronary artery bypass graft)   Assessment & Plan    Continue statin, Plavix, Imdur and beta blocker  Tobacco abuse   Assessment & Plan    Continue nicotine patch  Smoking cessation counseling  VTE Pharmacologic Prophylaxis:   Pharmacologic: Rivaroxaban (Xarelto)  Mechanical VTE Prophylaxis in Place: Yes    Discussions with Specialists or Other Care Team Provider: Nursing, Cardiology, CM, and attending    Education and Discussions with Family / Patient: n/a    Time Spent for Care: 30 minutes  More than 50% of total time spent on counseling and coordination of care as described above      Current Length of Stay: 0 day(s)    Current Patient Status: Inpatient   Certification Statement: The patient, admitted on an observation basis, will now require > 2 midnight hospital stay due to continued workup for his syncopal episode  Discharge Plan: Likely tomorrow 3/22/18    Code Status: Level 1 - Full Code      Subjective: The patient was seen and examined  The patient states he feels well and denies any lightheadedness or dizziness  Objective:     Vitals:   Temp (24hrs), Av 8 °F (36 6 °C), Min:97 5 °F (36 4 °C), Max:98 °F (36 7 °C)    HR:  [62-80] 62  Resp:  [16-20] 18  BP: (120-155)/(66-93) 130/79  SpO2:  [97 %-99 %] 98 %  Body mass index is 24 74 kg/m²  Input and Output Summary (last 24 hours): Intake/Output Summary (Last 24 hours) at 18 1444  Last data filed at 18 1252   Gross per 24 hour   Intake              360 ml   Output              400 ml   Net              -40 ml       Physical Exam:     Physical Exam   Constitutional: He is oriented to person, place, and time  Vital signs are normal  He appears well-developed and well-nourished  He is active and cooperative  Cardiovascular: Normal rate and regular rhythm  Murmur heard  Pulmonary/Chest: Effort normal and breath sounds normal  He has no wheezes  He has no rhonchi  He has no rales  Abdominal: Soft  Normal appearance and bowel sounds are normal  There is no tenderness  Neurological: He is alert and oriented to person, place, and time  No cranial nerve deficit  Skin: Skin is warm, dry and intact  Nursing note and vitals reviewed      Additional Data:     Labs:      Results from last 7 days  Lab Units 18  0502 18  2105   WBC Thousand/uL 11 12* 11 34*   HEMOGLOBIN g/dL 15 8 16 6   HEMATOCRIT % 46 8 49 1   PLATELETS Thousands/uL 277 292   NEUTROS PCT %  --  63   LYMPHS PCT %  --  22   MONOS PCT %  --  11   EOS PCT %  --  3       Results from last 7 days  Lab Units 18  0502   SODIUM mmol/L 139   POTASSIUM mmol/L 4  7   CHLORIDE mmol/L 105   CO2 mmol/L 24   BUN mg/dL 19   CREATININE mg/dL 0 92   CALCIUM mg/dL 9 4   TOTAL PROTEIN g/dL 6 8   BILIRUBIN TOTAL mg/dL 0 60   ALK PHOS U/L 85   ALT U/L 30   AST U/L 26   GLUCOSE RANDOM mg/dL 95       Results from last 7 days  Lab Units 03/21/18  0502   INR  2 53*       * I Have Reviewed All Lab Data Listed Above  * Additional Pertinent Lab Tests Reviewed: All Labs Within Last 24 Hours Reviewed    Imaging:    Imaging Reports Reviewed Today Include: chest x-ray, echocardiogram  Imaging Personally Reviewed by Myself Includes:  none    Recent Cultures (last 7 days):           Last 24 Hours Medication List:     Current Facility-Administered Medications:  acetaminophen 650 mg Oral Q6H PRN Harmony Martin MD   atorvastatin 40 mg Oral Daily With Dinner Harmony Martin MD   clopidogrel 75 mg Oral Daily Harmony Martin MD   digoxin 125 mcg Oral Daily Harmony Martin MD   enalapril 5 mg Oral Daily Harmony Martin MD   furosemide 20 mg Oral Daily Harmony Martin MD   isosorbide mononitrate 60 mg Oral Daily Harmony Martin MD   metoprolol succinate 100 mg Oral Daily Harmony Martin MD   nicotine 1 patch Transdermal Daily Harmony Martin MD   rivaroxaban 20 mg Oral Daily Harmony Martin MD   spironolactone 25 mg Oral Daily Harmony Martin MD        Today, Patient Was Seen By: Yudelka Patel PA-C    ** Please Note: Dictation voice to text software may have been used in the creation of this document   **

## 2018-03-22 VITALS
SYSTOLIC BLOOD PRESSURE: 119 MMHG | OXYGEN SATURATION: 98 % | RESPIRATION RATE: 18 BRPM | WEIGHT: 162.6 LBS | HEART RATE: 52 BPM | TEMPERATURE: 97.8 F | HEIGHT: 69 IN | BODY MASS INDEX: 24.08 KG/M2 | DIASTOLIC BLOOD PRESSURE: 64 MMHG

## 2018-03-22 LAB
ANION GAP SERPL CALCULATED.3IONS-SCNC: 9 MMOL/L (ref 4–13)
BASOPHILS # BLD AUTO: 0.12 THOUSANDS/ΜL (ref 0–0.1)
BASOPHILS NFR BLD AUTO: 1 % (ref 0–1)
BUN SERPL-MCNC: 18 MG/DL (ref 5–25)
CALCIUM SERPL-MCNC: 9.2 MG/DL (ref 8.3–10.1)
CHLORIDE SERPL-SCNC: 100 MMOL/L (ref 100–108)
CO2 SERPL-SCNC: 26 MMOL/L (ref 21–32)
CREAT SERPL-MCNC: 0.99 MG/DL (ref 0.6–1.3)
EOSINOPHIL # BLD AUTO: 0.58 THOUSAND/ΜL (ref 0–0.61)
EOSINOPHIL NFR BLD AUTO: 5 % (ref 0–6)
ERYTHROCYTE [DISTWIDTH] IN BLOOD BY AUTOMATED COUNT: 16.2 % (ref 11.6–15.1)
GFR SERPL CREATININE-BSD FRML MDRD: 78 ML/MIN/1.73SQ M
GLUCOSE SERPL-MCNC: 96 MG/DL (ref 65–140)
HCT VFR BLD AUTO: 48.5 % (ref 36.5–49.3)
HCV AB SER-ACNC: NEGATIVE
HGB BLD-MCNC: 16.4 G/DL (ref 12–17)
LYMPHOCYTES # BLD AUTO: 3.68 THOUSANDS/ΜL (ref 0.6–4.47)
LYMPHOCYTES NFR BLD AUTO: 31 % (ref 14–44)
MCH RBC QN AUTO: 30.5 PG (ref 26.8–34.3)
MCHC RBC AUTO-ENTMCNC: 33.8 G/DL (ref 31.4–37.4)
MCV RBC AUTO: 90 FL (ref 82–98)
MONOCYTES # BLD AUTO: 1.53 THOUSAND/ΜL (ref 0.17–1.22)
MONOCYTES NFR BLD AUTO: 13 % (ref 4–12)
NEUTROPHILS # BLD AUTO: 6.05 THOUSANDS/ΜL (ref 1.85–7.62)
NEUTS SEG NFR BLD AUTO: 50 % (ref 43–75)
PLATELET # BLD AUTO: 286 THOUSANDS/UL (ref 149–390)
PMV BLD AUTO: 9.3 FL (ref 8.9–12.7)
POTASSIUM SERPL-SCNC: 3.8 MMOL/L (ref 3.5–5.3)
RBC # BLD AUTO: 5.38 MILLION/UL (ref 3.88–5.62)
SODIUM SERPL-SCNC: 135 MMOL/L (ref 136–145)
TROPONIN I SERPL-MCNC: 0.05 NG/ML
WBC # BLD AUTO: 11.96 THOUSAND/UL (ref 4.31–10.16)

## 2018-03-22 PROCEDURE — 85025 COMPLETE CBC W/AUTO DIFF WBC: CPT | Performed by: PHYSICIAN ASSISTANT

## 2018-03-22 PROCEDURE — 84484 ASSAY OF TROPONIN QUANT: CPT | Performed by: PHYSICIAN ASSISTANT

## 2018-03-22 PROCEDURE — 99239 HOSP IP/OBS DSCHRG MGMT >30: CPT | Performed by: PHYSICIAN ASSISTANT

## 2018-03-22 PROCEDURE — 99233 SBSQ HOSP IP/OBS HIGH 50: CPT | Performed by: INTERNAL MEDICINE

## 2018-03-22 PROCEDURE — 80048 BASIC METABOLIC PNL TOTAL CA: CPT | Performed by: PHYSICIAN ASSISTANT

## 2018-03-22 RX ORDER — POTASSIUM CHLORIDE 20 MEQ/1
20 TABLET, EXTENDED RELEASE ORAL ONCE
Status: COMPLETED | OUTPATIENT
Start: 2018-03-22 | End: 2018-03-22

## 2018-03-22 RX ADMIN — SPIRONOLACTONE 25 MG: 25 TABLET, FILM COATED ORAL at 09:56

## 2018-03-22 RX ADMIN — METOPROLOL SUCCINATE 100 MG: 100 TABLET, EXTENDED RELEASE ORAL at 09:57

## 2018-03-22 RX ADMIN — DIGOXIN 125 MCG: 125 TABLET ORAL at 09:57

## 2018-03-22 RX ADMIN — POTASSIUM CHLORIDE 20 MEQ: 1500 TABLET, EXTENDED RELEASE ORAL at 16:49

## 2018-03-22 RX ADMIN — FUROSEMIDE 20 MG: 20 TABLET ORAL at 09:57

## 2018-03-22 RX ADMIN — RIVAROXABAN 20 MG: 10 TABLET, FILM COATED ORAL at 09:56

## 2018-03-22 RX ADMIN — ENALAPRIL MALEATE 5 MG: 5 TABLET ORAL at 09:57

## 2018-03-22 RX ADMIN — ATORVASTATIN CALCIUM 40 MG: 40 TABLET, FILM COATED ORAL at 16:49

## 2018-03-22 RX ADMIN — ISOSORBIDE MONONITRATE 60 MG: 60 TABLET, EXTENDED RELEASE ORAL at 09:57

## 2018-03-22 RX ADMIN — CLOPIDOGREL BISULFATE 75 MG: 75 TABLET ORAL at 09:57

## 2018-03-22 NOTE — DISCHARGE SUMMARY
Discharge Summary - [address] [facility]    Patient Information: Mimi Smart 79 y o  male MRN: [id number]  Unit/Bed#: 185-23 Encounter: [id number]    Discharging Physician / Practitioner: Shonda Morin PA-C  PCP: Reyna Mart DO  Admission Date: 3/20/2018  Discharge Date: 03/22/18    Disposition:     Other: [facility]       Reason for Admission: Syncope    Discharge Diagnoses:     Principal Problem:    Syncope  Active Problems:    Ischemic cardiomyopathy    S/P implantation of automatic cardioverter/defibrillator (AICD)    Essential hypertension    Peripheral arterial disease ([facility] [address] )    S/P CABG (coronary artery bypass graft)    Tobacco abuse  Resolved Problems:    * No resolved hospital problems  *      Consultations During Hospital Stay:  · Cardiology    Procedures Performed:     · Chest x-ray: No acute cardiopulmonary disease  Significant Findings / Test Results:     · Troponin 0 07    Incidental Findings:   · none       Complications:  none    Hospital Course:     Mimi Smart is a 79 y o  male patient who originally presented to the hospital on 3/20/2018 due to syncopal episode  He reports compliance with his prescribed medications & physician follow-up  No recent fever, malaise, skin rash, bleeding, dyspnea, cough, abd pain, diarrhea, vomiting, dysuria, headache, neck stiffness, hemiparesis or acute confusion  He was in his usual state of health this evening when, while seated at an [facility] facility bar having an alcoholic beverage, he abruptly felt lightheaded without any associated chest pain or palpitations prior to passing out for a "few seconds"  He regained consciousness & found himself laying on the floor without any tongue biting, postictal confusion, incontinence of stool or urine  He has felt well since then & currently has no complaints  He did not feel his AICD discharge prior to passing out      On admission he was placed on telemetry   Troponin's were noted to be mildly elevated at 0 05 to 0 07  Cardiology was consulted and felt his elevated troponin was secondary to underlying ischemic cardiomyopathy  Cardiology also recommended interrogating his ICD which was done and showed V fib and defibulation on March 20, 2018 at 19:49 which was when his syncopal episode occurred  These findings were discussed with Cardiology who recommended that patient be transferred to [facility] since the  Patients follows [facility]  These findings and the recommendations from cardiology was discussed with the patient who was in agreement with the transfer  The patients case was discussed with Dr Arturo Sheridan of [facility] cardiology who accepted the patient onto his service  Condition at Discharge: good     Discharge Day Visit / Exam:     * Please refer to separate progress note for these details *    Discussion with Family: n/a      Discharge instructions/Information to patient and family:   See after visit summary for information provided to patient and family  Provisions for Follow-Up Care:  See after visit summary for information related to follow-up care and any pertinent home health orders  Planned Readmission: no    Discharge Statement:  I spent 40 minutes discharging the patient  This time was spent on the day of discharge  I had direct contact with the patient on the day of discharge  Greater than 50% of the total time was spent examining patient, answering all patient questions, arranging and discussing plan of care with patient as well as directly providing post-discharge instructions  Additional time then spent on discharge activities  Discharge Medications:  See after visit summary for reconciled discharge medications provided to patient and family  ** Please Note: This note has been constructed using a voice recognition system   **

## 2018-03-22 NOTE — PHYSICIAN ADVISOR
Current patient class: Inpatient  The patient is currently on Hospital Day: 2      The patient was admitted to the hospital at [phone] on 3/21/18 for the following diagnosis:  Syncope [R55]  Elevated troponin [R74 8]  AICD (automatic cardioverter/defibrillator) present [Z95 810]       There is documentation in the medical record of an expected length of stay of at least 2 midnights  The patient is therefore expected to satisfy the 2 midnight benchmark and given the 2 midnight presumption is appropriate for INPATIENT ADMISSION  Given this expectation of a satisfying stay, CMS instructs us that the patient is most often appropriate for inpatient admission under part A provided medical necessity is documented in the chart  After review of the relevant documentation, labs, vital signs and test results, the patient is appropriate for INPATIENT ADMISSION  Admission to the hospital as an inpatient is a complex decision making process which requires the practitioner to consider the patients presenting complaint, history and physical examination and all relevant testing  With this in mind, in this case, the patient was deemed appropriate for INPATIENT ADMISSION  After review of the documentation and testing available at the time of the admission I concur with this clinical determination of medical necessity  Rationale is as follows: The patient is a 79 yrs old Male who presented to the ED at 3/20/2018  8:18 PM with a chief complaint of Syncope (Pt was at the bar and had a syncopal episode   Pt denies any pain, nausea or vomiting  )    The patients vitals on arrival were ED Triage Vitals [03/20/18 2023]   Temperature Pulse Respirations Blood Pressure SpO2   97 8 °F (36 6 °C) 80 17 126/80 99 %      Temp Source Heart Rate Source Patient Position - Orthostatic VS BP Location FiO2 (%)   Temporal Monitor Lying Right arm --      Pain Score       No Pain           Past Medical History:   Diagnosis Date    Essential hypertension 3/20/2018    Heart disease     Hypertension     Ischemic cardiomyopathy 3/20/2018    Neuropathy     Peripheral arterial disease ([facility] ) 3/20/2018    PVD (peripheral vascular disease) ([facility] )     S/P CABG (coronary artery bypass graft) 3/20/2018    S/P implantation of automatic cardioverter/defibrillator (AICD) 3/20/2018    Tobacco abuse 3/20/2018    Vitamin D deficiency      Past Surgical History:   Procedure Laterality Date   [address]  2009    CORONARY ARTERY BYPASS GRAFT  1999           Consults have been placed to:   IP CONSULT TO CARDIOLOGY    Vitals:    03/21/18 0558 03/21/18 0739 03/21/18 1516 03/21/18 2321   BP:  130/79 128/78 126/65   BP Location:  Left arm Left arm Right arm   Pulse:  62 61 61   Resp:  18 18 18   Temp:  98 °F (36 7 °C) (!) 97 4 °F (36 3 °C) (!) 97 1 °F (36 2 °C)   TempSrc:  Temporal Temporal Temporal   SpO2:  98% 97% 96%   Weight: 76 kg (167 lb 8 8 oz)      Height:           Most recent labs:    Recent Labs      03/21/18   0502   WBC  11 12*   HGB  15 8   HCT  46 8   PLT  277   K  4 7   NA  139   CALCIUM  9 4   BUN  19   CREATININE  0 92   INR  2 53*   TROPONINI  0 05*   AST  26   ALT  30   ALKPHOS  85   BILITOT  0 60       Scheduled Meds:  Current Facility-Administered Medications:  acetaminophen 650 mg Oral Q6H PRN Ania Cortez MD   atorvastatin 40 mg Oral Daily With Dinner Ania Cortez MD   clopidogrel 75 mg Oral Daily Ania Cortez MD   digoxin 125 mcg Oral Daily Ania Cortez MD   enalapril 5 mg Oral Daily Ania Cortez MD   furosemide 20 mg Oral Daily Ania Cortez MD   isosorbide mononitrate 60 mg Oral Daily Ania Cortez MD   metoprolol succinate 100 mg Oral Daily Ania Cortez MD   nicotine 1 patch Transdermal Daily Ania Cortez MD   rivaroxaban 20 mg Oral Daily Ania Cortez MD   spironolactone 25 mg Oral Daily Ania Cortez MD     Continuous Infusions: PRN Meds:   acetaminophen    Surgical procedures (if appropriate):

## 2018-03-22 NOTE — SOCIAL WORK
Pt is being transferred to [address] [place] [address] room 3A 10A  To the service of Dr Víctor Cantu  [address] [facility] ambulance will pick the pt up at 7 pm for BLS transport

## 2018-03-22 NOTE — NURSING NOTE
Patient discharged to [facility] by [address] [facility]

## 2018-03-22 NOTE — PROGRESS NOTES
Cardiology Progress Note - Dorethia Eisenmenger 79 y o  male MRN: [id number]    Unit/Bed#: 411-01 Encounter: [id number]    Assesment/ Plan:  1  Near syncopal episode  2  Coronary artery disease status post CABG  3  Ischemic cardiomyopathy  4  Implanted ICD  5  History of ventricular tachycardia  6  Peripheral arterial disease  7  Hypertension  8  Tobacco and alcohol abuse  9  Paroxysmal atrial fibrillation on anticoagulation     Recommendations:  Patient had a near syncopal episode  Maintaining sinus rhythm on telemetry  Keep potassium greater than 4 magnesium greater than 2  Awaiting on ICD interrogation  If no events could be discharged home  Otherwise no cardiac symptoms at this time  Minimally elevated troponin secondary to underlying ischemic cardiomyopathy  Patient needs to stay off  alcohol and tobacco     Echocardiogram was reviewed no significant change from prior        Subjective:    No significant events overnight  Feels well with no complaints no events on telemetry  ROS    Objective:   Vitals: Blood pressure 124/70, pulse 56, temperature 97 5 °F (36 4 °C), temperature source Temporal, resp  rate 18, height 5' 9" (1 753 m), weight 73 8 kg (162 lb 9 6 oz), SpO2 96 %  , Body mass index is 24 01 kg/m² , Orthostatic Blood Pressures    Flowsheet Row Most Recent Value   Blood Pressure  124/70 filed at 03/22/2018 0722   Patient Position - Orthostatic VS  Lying filed at 03/22/2018 0123         Systolic (25GGI), SJR:538 , Min:124 , JGW:499     Diastolic (80KBM), QFL:81, Min:65, Max:78      Intake/Output Summary (Last 24 hours) at 03/22/18 1019  Last data filed at 03/22/18 0800   Gross per 24 hour   Intake              840 ml   Output                0 ml   Net              840 ml     Weight (last 2 days)     Date/Time   Weight    03/22/18 0545  73 8 (162 6)    03/21/18 0558  76 (167 55)    03/20/18 2348  76 (167 55)    03/20/18 2023  79 1 (174 3)                Telemetry Review: No significant arrhythmias seen on telemetry review  EKG personally reviewed by Ayden Childs DO  Physical Exam   Constitutional: He is oriented to person, place, and time  He appears well-nourished  No distress  HENT:   Head: Atraumatic  Eyes: Conjunctivae are normal  Pupils are equal, round, and reactive to light  Neck: Neck supple  Cardiovascular: Normal rate, regular rhythm and S1 normal   Exam reveals no friction rub  Murmur heard  Systolic murmur is present with a grade of 1/6   Pulmonary/Chest: Effort normal and breath sounds normal  No respiratory distress  He has no wheezes  He has no rales  Abdominal: Bowel sounds are normal  He exhibits no distension  There is no tenderness  There is no rebound  Musculoskeletal: He exhibits no edema or tenderness  Neurological: He is alert and oriented to person, place, and time  No cranial nerve deficit  Skin: Skin is warm and dry  No erythema  Nursing note and vitals reviewed          Laboratory Results:    Results from last 7 days  Lab Units 03/22/18  0423 03/21/18  0502 03/21/18  0004   TROPONIN I ng/mL 0 05* 0 05* 0 07*       CBC with diff:   Results from last 7 days  Lab Units 03/22/18  0423 03/21/18  0502 03/20/18  2105   WBC Thousand/uL 11 96* 11 12* 11 34*   HEMOGLOBIN g/dL 16 4 15 8 16 6   HEMATOCRIT % 48 5 46 8 49 1   MCV fL 90 91 91   PLATELETS Thousands/uL 286 277 292   MCH pg 30 5 30 9 30 6   MCHC g/dL 33 8 33 8 33 8   RDW % 16 2* 16 3* 16 1*   MPV fL 9 3 9 5 9 1         CMP:  Results from last 7 days  Lab Units 03/22/18  0423 03/21/18  0502 03/20/18  2105   SODIUM mmol/L 135* 139 141   POTASSIUM mmol/L 3 8 4 7 3 7   CHLORIDE mmol/L 100 105 104   CO2 mmol/L 26 24 26   ANION GAP mmol/L 9 10 11   BUN mg/dL 18 19 20   CREATININE mg/dL 0 99 0 92 1 08   GLUCOSE RANDOM mg/dL 96 95 106   CALCIUM mg/dL 9 2 9 4 9 6   AST U/L  --  26  --    ALT U/L  --  30  --    ALK PHOS U/L  --  85  --    TOTAL PROTEIN g/dL  --  6 8  --    BILIRUBIN TOTAL mg/dL --  0 60  --    EGFR ml/min/1 73sq m 78 86 71         BMP:  Results from last 7 days  Lab Units 18  0423 18  0502 18  2105   SODIUM mmol/L 135* 139 141   POTASSIUM mmol/L 3 8 4 7 3 7   CHLORIDE mmol/L 100 105 104   CO2 mmol/L 26 24 26   BUN mg/dL 18 19 20   CREATININE mg/dL 0 99 0 92 1 08   GLUCOSE RANDOM mg/dL 96 95 106   CALCIUM mg/dL 9 2 9 4 9 6       BNP: No results for input(s): BNP in the last 72 hours  Magnesium:   Results from last 7 days  Lab Units 18  0502 18  2105   MAGNESIUM mg/dL 2 3 1 9       Coags:   Results from last 7 days  Lab Units 18  0502 18  210   INR  2 53* 3 15*       TSH:        Hemoglobin A1C       Lipid Profile:       Cardiac testing:   Results for orders placed during the hospital encounter of 18   Echo complete with contrast if indicated    Narrative [address] [facility] [address]  [phone]    Transthoracic Echocardiogram  2D, M-mode, Doppler, and Color Doppler    Study date:  21-Mar-2018    Patient: Derek Villareal  MR number: [id number]  Account number: [de-identified]  : 1950  Age: 79 years  Gender: Male  Status: Outpatient  Location: Bedside  Height: 69 in  Weight: 167 lb  BP: 143/ 84 mmHg    Indications: syncope    Diagnoses: R55  - Syncope and collapse    Sonographer:  Dayana Gayle [facility], CCT  Primary Physician:  Marlyn Major DO  Referring Physician:  James Calhoun MD  Group:  [facility]  Interpreting Physician:  Emile Eli DO    SUMMARY    LEFT VENTRICLE:  Systolic function was severely reduced  Ejection fraction was estimated in the range of 25 % to 30 %  There was severe diffuse hypokinesis with regional variations  There was akinesis of the basal-mid anteroseptal and basal-mid inferior wall(s)  Wall thickness was mildly increased  RIGHT VENTRICLE:  Systolic function was mildly reduced      LEFT ATRIUM:  The atrium was mildly dilated  RIGHT ATRIUM:  The atrium was mildly dilated  MITRAL VALVE:  There was moderate regurgitation  TRICUSPID VALVE:  There was mild to moderate regurgitation  HISTORY: PRIOR HISTORY: cardiomyopathy, icd, elevated troponin    PROCEDURE: The procedure was performed at the bedside  This was a routine study  The transthoracic approach was used  The study included complete 2D imaging, M-mode, complete spectral Doppler, and color Doppler  The heart rate was 75 bpm,  at the start of the study  Image quality was adequate  LEFT VENTRICLE: Size was normal  Systolic function was severely reduced  Ejection fraction was estimated in the range of 25 % to 30 %  There was severe diffuse hypokinesis with regional variations  There was akinesis of the basal-mid  anteroseptal and basal-mid inferior wall(s)  Wall thickness was mildly increased  DOPPLER: Left ventricular diastolic function parameters were abnormal     RIGHT VENTRICLE: The size was normal  Systolic function was mildly reduced  Wall thickness was normal  A pacing wire was present  LEFT ATRIUM: The atrium was mildly dilated  RIGHT ATRIUM: The atrium was mildly dilated  MITRAL VALVE: Valve structure was normal  There was normal leaflet separation  DOPPLER: The transmitral velocity was within the normal range  There was no evidence for stenosis  There was moderate regurgitation  AORTIC VALVE: The valve was trileaflet  Leaflets exhibited normal thickness and normal cuspal separation  DOPPLER: Transaortic velocity was within the normal range  There was no evidence for stenosis  There was no regurgitation  TRICUSPID VALVE: The valve structure was normal  There was normal leaflet separation  DOPPLER: The transtricuspid velocity was within the normal range  There was no evidence for stenosis  There was mild to moderate regurgitation  PULMONIC VALVE: Not well visualized  PERICARDIUM: There was no pericardial effusion   The pericardium was normal in appearance  AORTA: The root exhibited normal size  SYSTEMIC VEINS: IVC: The inferior vena cava was not well visualized  SYSTEM MEASUREMENT TABLES    2D  %FS: 14 84 %  Ao Diam: 3 11 cm  EDV(Teich): 210 98 ml  EF(Teich): 30 75 %  ESV(Teich): 146 1 ml  IVSd: 0 44 cm  LA Area: 19 44 cm2  LA Diam: 5 22 cm  LVEDV MOD A4C: 111 38 ml  LVEF MOD A4C: 23 89 %  LVESV MOD A4C: 84 77 ml  LVIDd: 6 43 cm  LVIDs: 5 48 cm  LVLd A4C: 8 28 cm  LVLs A4C: 7 93 cm  LVPWd: 0 68 cm  RA Area: 19 05 cm2  RV DIAM: 2 94 cm  SV MOD A4C: 26 6 ml  SV(Teich): 64 88 ml    CW  AV Vmax: 1 51 m/s  AV maxP 13 mmHg  RAP: 0 mmHg  TR Vmax: 3 15 m/s  TR maxP 92 mmHg    MM  TAPSE: 2 87 cm    PW  E' Sept: 0 07 m/s  E/E' Sept: 18 48  LVOT Vmax: 1 04 m/s  LVOT maxP 36 mmHg  MV A Roly: 0 26 m/s  MV Dec Laurens: 7 18 m/s2  MV DecT: 169 93 ms  MV E Roly: 1 22 m/s  MV E/A Ratio: 4 69  RVSP: 39 92 mmHg    [facility]redited [facility]    Prepared and electronically signed by    Roberth Amado DO  Signed 21-Mar-2018 12:06:18       No results found for this or any previous visit  No results found for this or any previous visit  No results found for this or any previous visit      Meds/Allergies   all current active meds have been reviewed  Prescriptions Prior to Admission   Medication    atorvastatin (LIPITOR) 40 mg tablet    clopidogrel (PLAVIX) 75 mg tablet    digoxin (LANOXIN) 0 125 mg tablet    enalapril (VASOTEC) 5 mg tablet    furosemide (LASIX) 20 mg tablet    isosorbide mononitrate (IMDUR) 60 mg 24 hr tablet    metoprolol succinate (TOPROL-XL) 100 mg 24 hr tablet    rivaroxaban (XARELTO) 20 mg tablet    spironolactone (ALDACTONE) 25 mg tablet          Assessment:  Principal Problem:    Syncope  Active Problems:    Tobacco abuse    S/P CABG (coronary artery bypass graft)    Peripheral arterial disease (HCC)    S/P implantation of automatic cardioverter/defibrillator (AICD)    Ischemic cardiomyopathy    Essential hypertension

## 2018-04-03 ENCOUNTER — TELEPHONE (OUTPATIENT)
Dept: INTERNAL MEDICINE CLINIC | Facility: CLINIC | Age: 68
End: 2018-04-03

## 2018-04-17 ENCOUNTER — TELEPHONE (OUTPATIENT)
Dept: INTERNAL MEDICINE CLINIC | Facility: CLINIC | Age: 68
End: 2018-04-17

## 2018-04-17 DIAGNOSIS — R25.2 LEG CRAMPING: Primary | ICD-10-CM

## 2018-04-17 RX ORDER — POTASSIUM CHLORIDE 20 MEQ/1
20 TABLET, EXTENDED RELEASE ORAL DAILY
Qty: 30 TABLET | Refills: 0 | Status: SHIPPED | OUTPATIENT
Start: 2018-04-17 | End: 2019-01-01 | Stop reason: ALTCHOICE

## 2018-04-17 NOTE — TELEPHONE ENCOUNTER
Would recheck potassium level    Can order kdur and take one today 20meq and then will advise once level back for further rx

## 2018-04-18 ENCOUNTER — TRANSCRIBE ORDERS (OUTPATIENT)
Dept: LAB | Facility: MEDICAL CENTER | Age: 68
End: 2018-04-18

## 2018-04-18 ENCOUNTER — APPOINTMENT (OUTPATIENT)
Dept: LAB | Facility: MEDICAL CENTER | Age: 68
End: 2018-04-18
Payer: MEDICARE

## 2018-04-18 DIAGNOSIS — R25.2 LEG CRAMPING: ICD-10-CM

## 2018-04-18 LAB
ANION GAP SERPL CALCULATED.3IONS-SCNC: 6 MMOL/L (ref 4–13)
BUN SERPL-MCNC: 28 MG/DL (ref 5–25)
CALCIUM SERPL-MCNC: 9.2 MG/DL (ref 8.3–10.1)
CHLORIDE SERPL-SCNC: 109 MMOL/L (ref 100–108)
CO2 SERPL-SCNC: 25 MMOL/L (ref 21–32)
CREAT SERPL-MCNC: 1.32 MG/DL (ref 0.6–1.3)
GFR SERPL CREATININE-BSD FRML MDRD: 55 ML/MIN/1.73SQ M
GLUCOSE P FAST SERPL-MCNC: 118 MG/DL (ref 65–99)
POTASSIUM SERPL-SCNC: 5.2 MMOL/L (ref 3.5–5.3)
SODIUM SERPL-SCNC: 140 MMOL/L (ref 136–145)

## 2018-04-18 PROCEDURE — 36415 COLL VENOUS BLD VENIPUNCTURE: CPT

## 2018-04-18 PROCEDURE — 80048 BASIC METABOLIC PNL TOTAL CA: CPT

## 2018-04-19 ENCOUNTER — TELEPHONE (OUTPATIENT)
Dept: INTERNAL MEDICINE CLINIC | Facility: CLINIC | Age: 68
End: 2018-04-19

## 2018-04-19 ENCOUNTER — TRANSCRIBE ORDERS (OUTPATIENT)
Dept: INTERNAL MEDICINE CLINIC | Facility: CLINIC | Age: 68
End: 2018-04-19

## 2018-04-19 DIAGNOSIS — K92.1 BLOOD IN STOOL: ICD-10-CM

## 2018-04-19 DIAGNOSIS — R53.83 FATIGUE, UNSPECIFIED TYPE: ICD-10-CM

## 2018-04-19 DIAGNOSIS — K92.1 BLOOD IN STOOL: Primary | ICD-10-CM

## 2018-04-19 DIAGNOSIS — R53.83 FATIGUE, UNSPECIFIED TYPE: Primary | ICD-10-CM

## 2018-04-19 NOTE — TELEPHONE ENCOUNTER
He should be get cbc to check his hgb and iron level but needs to see GI for further eval - not sure if he has seen anyone in the past if not setup with [facility]

## 2018-04-19 NOTE — TELEPHONE ENCOUNTER
Lab work ordered, referral created for Hemal Vásquez 59 doctor in [facility] per pt request to stay in  network

## 2018-04-20 ENCOUNTER — LAB (OUTPATIENT)
Dept: LAB | Facility: MEDICAL CENTER | Age: 68
End: 2018-04-20
Payer: MEDICARE

## 2018-04-20 ENCOUNTER — TRANSCRIBE ORDERS (OUTPATIENT)
Dept: RADIOLOGY | Facility: MEDICAL CENTER | Age: 68
End: 2018-04-20

## 2018-04-20 DIAGNOSIS — K92.1 BLOOD IN STOOL: ICD-10-CM

## 2018-04-20 DIAGNOSIS — R53.83 FATIGUE, UNSPECIFIED TYPE: ICD-10-CM

## 2018-04-20 LAB
BASOPHILS # BLD AUTO: 0.11 THOUSANDS/ΜL (ref 0–0.1)
BASOPHILS NFR BLD AUTO: 1 % (ref 0–1)
EOSINOPHIL # BLD AUTO: 0.42 THOUSAND/ΜL (ref 0–0.61)
EOSINOPHIL NFR BLD AUTO: 3 % (ref 0–6)
ERYTHROCYTE [DISTWIDTH] IN BLOOD BY AUTOMATED COUNT: 18.6 % (ref 11.6–15.1)
HCT VFR BLD AUTO: 27.9 % (ref 36.5–49.3)
HGB BLD-MCNC: 8.8 G/DL (ref 12–17)
IRON SERPL-MCNC: 31 UG/DL (ref 65–175)
LYMPHOCYTES # BLD AUTO: 3.19 THOUSANDS/ΜL (ref 0.6–4.47)
LYMPHOCYTES NFR BLD AUTO: 22 % (ref 14–44)
MCH RBC QN AUTO: 30.3 PG (ref 26.8–34.3)
MCHC RBC AUTO-ENTMCNC: 31.5 G/DL (ref 31.4–37.4)
MCV RBC AUTO: 96 FL (ref 82–98)
MONOCYTES # BLD AUTO: 1.72 THOUSAND/ΜL (ref 0.17–1.22)
MONOCYTES NFR BLD AUTO: 12 % (ref 4–12)
NEUTROPHILS # BLD AUTO: 8.75 THOUSANDS/ΜL (ref 1.85–7.62)
NEUTS SEG NFR BLD AUTO: 62 % (ref 43–75)
NRBC BLD AUTO-RTO: 0 /100 WBCS
PLATELET # BLD AUTO: 367 THOUSANDS/UL (ref 149–390)
PMV BLD AUTO: 9.4 FL (ref 8.9–12.7)
RBC # BLD AUTO: 2.9 MILLION/UL (ref 3.88–5.62)
WBC # BLD AUTO: 14.25 THOUSAND/UL (ref 4.31–10.16)

## 2018-04-20 PROCEDURE — 83540 ASSAY OF IRON: CPT

## 2018-04-20 PROCEDURE — 85025 COMPLETE CBC W/AUTO DIFF WBC: CPT

## 2018-04-20 PROCEDURE — 36415 COLL VENOUS BLD VENIPUNCTURE: CPT

## 2018-04-24 ENCOUNTER — TELEPHONE (OUTPATIENT)
Dept: INTERNAL MEDICINE CLINIC | Facility: CLINIC | Age: 68
End: 2018-04-24

## 2018-04-30 ENCOUNTER — TRANSITIONAL CARE MANAGEMENT (OUTPATIENT)
Dept: INTERNAL MEDICINE CLINIC | Facility: CLINIC | Age: 68
End: 2018-04-30

## 2018-04-30 ENCOUNTER — TELEPHONE (OUTPATIENT)
Dept: INTERNAL MEDICINE CLINIC | Facility: CLINIC | Age: 68
End: 2018-04-30

## 2018-05-01 RX ORDER — MEXILETINE HYDROCHLORIDE 150 MG/1
150 CAPSULE ORAL
COMMUNITY
Start: 2018-03-24 | End: 2019-04-01

## 2018-05-01 RX ORDER — ASPIRIN 81 MG/1
81 TABLET, CHEWABLE ORAL
COMMUNITY
Start: 2018-03-24 | End: 2019-08-22

## 2018-05-02 ENCOUNTER — OFFICE VISIT (OUTPATIENT)
Dept: INTERNAL MEDICINE CLINIC | Facility: CLINIC | Age: 68
End: 2018-05-02
Payer: MEDICARE

## 2018-05-02 VITALS
BODY MASS INDEX: 23.59 KG/M2 | WEIGHT: 159.25 LBS | OXYGEN SATURATION: 99 % | TEMPERATURE: 97 F | HEIGHT: 69 IN | HEART RATE: 52 BPM

## 2018-05-02 DIAGNOSIS — K92.1 GASTROINTESTINAL HEMORRHAGE WITH MELENA: Primary | ICD-10-CM

## 2018-05-02 DIAGNOSIS — I73.9 PVD (PERIPHERAL VASCULAR DISEASE) (HCC): Chronic | ICD-10-CM

## 2018-05-02 DIAGNOSIS — K55.9 ISCHEMIC COLITIS (HCC): ICD-10-CM

## 2018-05-02 DIAGNOSIS — Z95.1 S/P CABG (CORONARY ARTERY BYPASS GRAFT): Chronic | ICD-10-CM

## 2018-05-02 PROBLEM — I47.2 VT (VENTRICULAR TACHYCARDIA) (HCC): Status: ACTIVE | Noted: 2017-06-12

## 2018-05-02 PROBLEM — I20.9 ANGINA PECTORIS (HCC): Status: ACTIVE | Noted: 2017-05-31

## 2018-05-02 PROBLEM — I70.213 ATHEROSCLEROSIS OF NATIVE ARTERY OF BOTH LOWER EXTREMITIES WITH INTERMITTENT CLAUDICATION (HCC): Status: ACTIVE | Noted: 2018-03-05

## 2018-05-02 PROBLEM — I48.0 PAF (PAROXYSMAL ATRIAL FIBRILLATION) (HCC): Status: ACTIVE | Noted: 2018-04-05

## 2018-05-02 PROBLEM — D62 ACUTE BLOOD LOSS ANEMIA: Status: ACTIVE | Noted: 2018-04-24

## 2018-05-02 PROBLEM — I49.01 VENTRICULAR FIBRILLATION (HCC): Status: ACTIVE | Noted: 2018-04-05

## 2018-05-02 PROCEDURE — 99496 TRANSJ CARE MGMT HIGH F2F 7D: CPT | Performed by: INTERNAL MEDICINE

## 2018-05-02 NOTE — PATIENT INSTRUCTIONS
Acute Posthemorrhagic Anemia   AMBULATORY CARE:   Acute posthemorrhagic anemia  is a condition that develops when you lose a large amount of blood quickly  Anemia is a low number of red blood cells or a low amount of hemoglobin in your red blood cells  Hemoglobin is a protein that helps red blood cells carry oxygen throughout your body  Common signs and symptoms of acute posthemorrhagic anemia:  You may have any of the following, depending on how much blood you lost:  · Feeling weak, tired, dizzy, or lightheaded    · A fast or irregular heartbeat    · Pale or cold, clammy skin     · Shortness of breath, or fast, shallow breaths    · Nausea or loss of appetite    · Urinating little or not at all    · Trouble concentrating, or confusion    · Headache or seizures    · Chest pain or sweating  Call 911 or have someone call 911 for any of the following:   · You lose consciousness or cannot be woken  · You have severe chest pain  Seek care immediately if:   · You have dark or bloody bowel movements  Contact your healthcare provider if:   · Your symptoms are worse, even after treatment  · You have questions or concerns about your condition or care  Treatment:  Your healthcare provider may have you stop any medicines that are causing bleeding  Treatment depends on the amount of blood you lost, the cause of the bleeding, and your symptoms:  · A blood transfusion  may be needed if your body cannot replace the blood you have lost     · Surgery  may be needed to stop the bleeding, or to fix an injury  · Fluids  may be given through an IV to help increase your blood pressure  · Iron supplements  may be given if your iron level is too low  · Extra oxygen  may be needed if your oxygen level is too low  Manage your symptoms:   · Rest as needed  Anemia may make you more tired than usual  Rest will help you heal and can help prevent more bleeding       · Eat healthy foods rich in iron together with foods rich in vitamin C  Nuts, meat, dark leafy green vegetables, and beans are high in iron and protein  Vitamin C helps your body absorb iron  Foods rich in vitamin C include oranges and other citrus fruits  Ask your healthcare provider for a list of other foods that are high in iron or vitamin C  Ask if you need to be on a special diet  · Drink liquids as directed  Ask your healthcare provider how much liquid to drink and which liquids are best for you  For most people, good liquids are water, juice, and milk  Follow up with your healthcare provider as directed:  Write down your questions so you remember to ask them during your visits  © 2017 [address] [facility] Information is for End User's use only and may not be sold, redistributed or otherwise used for commercial purposes  All illustrations and images included in CareNotes® are the copyrighted property of A D A M , Inc  or Patrice Yin  The above information is an  only  It is not intended as medical advice for individual conditions or treatments  Talk to your doctor, nurse or pharmacist before following any medical regimen to see if it is safe and effective for you

## 2018-05-02 NOTE — PROGRESS NOTES
Assessment/Plan:          Diagnoses and all orders for this visit:    Gastrointestinal hemorrhage with melena  Pt has Gi reeval May 14 and will get repeat cbc with diff on 5/7  MVI daily and increased water intake encouraged    Ischemic colitis ([facility] )  Sxs seem to be better today - hopefully repeat CBC will be stable hgb    S/P CABG (coronary artery bypass graft)  Pt has appt with cardiology 5/4 and will see Dr webster at end of May    PVD (peripheral vascular disease) ([facility] )  He has to schedule follow up with vascular    Other orders  -     aspirin 81 mg chewable tablet; Chew 81 mg  -     mexiletine (MEXITIL) 150 mg capsule; Take 150 mg by mouth    RTO 6 weeks       Patient ID: Autumn Stokes is a 79 y o  male  HPI   Pt status post admission for gi bleed  He had weakness, leg pain and melena  He is doing better - appetite is back, no sob or chest pain and bowels are normal now  He still has leg cramps and unsure if that is related to the anemia or his ongoing vascular dx  He feels less tired  No dizziness or lightheadedness  No n/v/ or heartburn  Plavix was Dc but still on xarelto and asa    Review of Systems   Constitutional: Negative for chills, diaphoresis, fatigue and fever  HENT: Negative  Eyes: Negative  Respiratory:        Less dyspnea now   Cardiovascular: Negative for chest pain, palpitations and leg swelling  Gastrointestinal: Negative  Endocrine: Negative  Genitourinary: Negative  Musculoskeletal: Positive for myalgias  Skin: Negative for pallor  Allergic/Immunologic: Negative  Neurological: Negative for dizziness, light-headedness and headaches  Hematological: Negative  Bruising from iv sites and lab draws   Psychiatric/Behavioral: Negative        Past Medical History:   Diagnosis Date    Essential hypertension 3/20/2018    Heart disease     Hypertension     Ischemic cardiomyopathy 3/20/2018    Neuropathy     Peripheral arterial disease ([facility] ) 3/20/2018    [facility]VD [facility]peripheral [facility]) [facility] )     S/P CABG (coronary artery bypass graft) 3/20/2018    S/P implantation of automatic cardioverter/defibrillator (AICD) 3/20/2018    Tobacco abuse 3/20/2018    Vitamin D deficiency      Past Surgical History:   Procedure Laterality Date    CARDIAC CATHETERIZATION  1999    aterial kgzcikgtndbhddg-rhgds-0/29/1999    CARDIAC DEFIBRILLATOR PLACEMENT  2009    implantable eipkagotscgk-gnhqruemwvxng-bzdgm-April 2009    CORONARY ARTERY BYPASS GRAFT  1999    OTHER SURGICAL HISTORY      cath stent placement     Allergies   Allergen Reactions    Penicillins      Social History     Social History    Marital status: /Civil Union     Spouse name: N/A    Number of children: N/A    Years of education: N/A     Occupational History    Not on file  Social History Main Topics    Smoking status: Current Every Day Smoker     Packs/day: 0 50     Types: Cigarettes    Smokeless tobacco: Never Used    Alcohol use Yes      Comment: social    Drug use: No    Sexual activity: Not on file     Other Topics Concern    Not on file     Social History Narrative    Always uses seat belt    Always uses sunscreen    Caffeine use    Dental care, regularly    Patient has living will     Family History   Problem Relation Age of Onset    Colon polyps Brother      polyps of the sigmoid colon        Physical Exam   Constitutional: He is oriented to person, place, and time  He appears well-developed and well-nourished  No distress  Looks comfortable   HENT:   Head: Normocephalic and atraumatic  Right Ear: External ear normal    Left Ear: External ear normal    Nose: Nose normal    Mouth/Throat: Oropharynx is clear and moist  No oropharyngeal exudate  Eyes: Conjunctivae and EOM are normal  Pupils are equal, round, and reactive to light  No scleral icterus  Neck: Normal range of motion  Neck supple  No JVD present  No thyromegaly present     Cardiovascular: Normal rate and intact distal pulses  Exam reveals no gallop and no friction rub  Murmur heard  Pulmonary/Chest: Effort normal and breath sounds normal  No respiratory distress  He has no wheezes  He has no rales  He exhibits no tenderness  No dyspnea at rest   Abdominal: Soft  Bowel sounds are normal  He exhibits no distension and no mass  There is no tenderness  There is no rebound and no guarding  Musculoskeletal: Normal range of motion  He exhibits no edema, tenderness or deformity  Lymphadenopathy:     He has no cervical adenopathy  Neurological: He is alert and oriented to person, place, and time  He has normal reflexes  He displays normal reflexes  No cranial nerve deficit  He exhibits abnormal muscle tone  Coordination normal    Skin: Skin is warm and dry  No rash noted  He is not diaphoretic  No erythema  No pallor  Psychiatric: He has a normal mood and affect  His behavior is normal  Judgment and thought content normal    Nursing note and vitals reviewed  Vitals:    05/02/18 1225   Pulse: (!) 52   Temp: (!) 97 °F (36 1 °C)   TempSrc: Tympanic   SpO2: 99%   Weight: 72 2 kg (159 lb 4 oz)   Height: 5' 9" (1 753 m)       Transitional Care Management Review:  Viridiana Woodard is a 79 y o  male here for TCM follow up       During the TCM phone call patient stated:    Date and time hospital follow up call was made:  4/30/2018 10:42 AM  Hospital care reviewed:  Records reviewed  Patient was hopsitalized at:  [facility]  Date of admission:  4/25/18  Date of discharge:  4/28/18  Diagnosis:  rectal bleeding  Current symptoms:  None  Post hospital issues:  None  Scheduled for follow up?:  Yes  Referrals needed:  gastroenterologist  Did you obtain your prescribed medications:  Yes  Do you need help managing your perscriptions or medications:  No  Is transportation to your appointments needed:  No  I have advised the patient to call PCP with any new or worsening symptoms (please type in name along with any credentials):  Fara Councilman, PCR  Living Arrangements:  Family members  Support System:  Family  Are you recieving outpatient services:  No  Are you recieving home care services:  No  Are you using any community resources:  No  Current waiver service:  No  Have you fallen in the last 12 months:  No  Interperter language line required?:  No  Counseling:  Patient             Arron Service, DO

## 2018-05-07 ENCOUNTER — LAB (OUTPATIENT)
Dept: LAB | Facility: MEDICAL CENTER | Age: 68
End: 2018-05-07
Payer: MEDICARE

## 2018-05-07 ENCOUNTER — TRANSCRIBE ORDERS (OUTPATIENT)
Dept: LAB | Facility: MEDICAL CENTER | Age: 68
End: 2018-05-07

## 2018-05-07 DIAGNOSIS — K92.1 GASTROINTESTINAL HEMORRHAGE WITH MELENA: ICD-10-CM

## 2018-05-07 LAB
BASOPHILS # BLD AUTO: 0.18 THOUSANDS/ΜL (ref 0–0.1)
BASOPHILS NFR BLD AUTO: 1 % (ref 0–1)
EOSINOPHIL # BLD AUTO: 0.67 THOUSAND/ΜL (ref 0–0.61)
EOSINOPHIL NFR BLD AUTO: 5 % (ref 0–6)
ERYTHROCYTE [DISTWIDTH] IN BLOOD BY AUTOMATED COUNT: 16.4 % (ref 11.6–15.1)
HCT VFR BLD AUTO: 38.5 % (ref 36.5–49.3)
HGB BLD-MCNC: 11.9 G/DL (ref 12–17)
LYMPHOCYTES # BLD AUTO: 2.66 THOUSANDS/ΜL (ref 0.6–4.47)
LYMPHOCYTES NFR BLD AUTO: 21 % (ref 14–44)
MCH RBC QN AUTO: 28.7 PG (ref 26.8–34.3)
MCHC RBC AUTO-ENTMCNC: 30.9 G/DL (ref 31.4–37.4)
MCV RBC AUTO: 93 FL (ref 82–98)
MONOCYTES # BLD AUTO: 1.3 THOUSAND/ΜL (ref 0.17–1.22)
MONOCYTES NFR BLD AUTO: 10 % (ref 4–12)
NEUTROPHILS # BLD AUTO: 7.63 THOUSANDS/ΜL (ref 1.85–7.62)
NEUTS SEG NFR BLD AUTO: 63 % (ref 43–75)
NRBC BLD AUTO-RTO: 0 /100 WBCS
PLATELET # BLD AUTO: 545 THOUSANDS/UL (ref 149–390)
PMV BLD AUTO: 9.2 FL (ref 8.9–12.7)
RBC # BLD AUTO: 4.14 MILLION/UL (ref 3.88–5.62)
WBC # BLD AUTO: 12.5 THOUSAND/UL (ref 4.31–10.16)

## 2018-05-07 PROCEDURE — 36415 COLL VENOUS BLD VENIPUNCTURE: CPT

## 2018-05-07 PROCEDURE — 85025 COMPLETE CBC W/AUTO DIFF WBC: CPT

## 2018-05-08 DIAGNOSIS — D64.9 ANEMIA, UNSPECIFIED TYPE: Primary | ICD-10-CM

## 2018-05-21 ENCOUNTER — LAB (OUTPATIENT)
Dept: LAB | Facility: MEDICAL CENTER | Age: 68
End: 2018-05-21
Payer: MEDICARE

## 2018-05-21 ENCOUNTER — TRANSCRIBE ORDERS (OUTPATIENT)
Dept: LAB | Facility: MEDICAL CENTER | Age: 68
End: 2018-05-21

## 2018-05-21 DIAGNOSIS — D64.9 ANEMIA, UNSPECIFIED TYPE: ICD-10-CM

## 2018-05-21 LAB
BASOPHILS # BLD AUTO: 0.15 THOUSANDS/ΜL (ref 0–0.1)
BASOPHILS NFR BLD AUTO: 1 % (ref 0–1)
EOSINOPHIL # BLD AUTO: 0.87 THOUSAND/ΜL (ref 0–0.61)
EOSINOPHIL NFR BLD AUTO: 7 % (ref 0–6)
ERYTHROCYTE [DISTWIDTH] IN BLOOD BY AUTOMATED COUNT: 16 % (ref 11.6–15.1)
HCT VFR BLD AUTO: 38.8 % (ref 36.5–49.3)
HGB BLD-MCNC: 12.5 G/DL (ref 12–17)
LYMPHOCYTES # BLD AUTO: 3.24 THOUSANDS/ΜL (ref 0.6–4.47)
LYMPHOCYTES NFR BLD AUTO: 26 % (ref 14–44)
MCH RBC QN AUTO: 28.4 PG (ref 26.8–34.3)
MCHC RBC AUTO-ENTMCNC: 32.2 G/DL (ref 31.4–37.4)
MCV RBC AUTO: 88 FL (ref 82–98)
MONOCYTES # BLD AUTO: 1.7 THOUSAND/ΜL (ref 0.17–1.22)
MONOCYTES NFR BLD AUTO: 14 % (ref 4–12)
NEUTROPHILS # BLD AUTO: 6.43 THOUSANDS/ΜL (ref 1.85–7.62)
NEUTS SEG NFR BLD AUTO: 52 % (ref 43–75)
NRBC BLD AUTO-RTO: 0 /100 WBCS
PLATELET # BLD AUTO: 413 THOUSANDS/UL (ref 149–390)
PMV BLD AUTO: 9.1 FL (ref 8.9–12.7)
RBC # BLD AUTO: 4.4 MILLION/UL (ref 3.88–5.62)
WBC # BLD AUTO: 12.45 THOUSAND/UL (ref 4.31–10.16)

## 2018-05-21 PROCEDURE — 85025 COMPLETE CBC W/AUTO DIFF WBC: CPT

## 2018-05-21 PROCEDURE — 36415 COLL VENOUS BLD VENIPUNCTURE: CPT

## 2018-05-23 ENCOUNTER — TRANSCRIBE ORDERS (OUTPATIENT)
Dept: INTERNAL MEDICINE CLINIC | Facility: CLINIC | Age: 68
End: 2018-05-23

## 2018-05-23 DIAGNOSIS — D64.9 ANEMIA, UNSPECIFIED TYPE: Primary | ICD-10-CM

## 2018-06-22 ENCOUNTER — OFFICE VISIT (OUTPATIENT)
Dept: HEMATOLOGY ONCOLOGY | Facility: HOSPITAL | Age: 68
End: 2018-06-22
Payer: MEDICARE

## 2018-06-22 ENCOUNTER — APPOINTMENT (OUTPATIENT)
Dept: LAB | Facility: HOSPITAL | Age: 68
End: 2018-06-22
Payer: MEDICARE

## 2018-06-22 VITALS
TEMPERATURE: 97 F | BODY MASS INDEX: 24.85 KG/M2 | DIASTOLIC BLOOD PRESSURE: 64 MMHG | SYSTOLIC BLOOD PRESSURE: 98 MMHG | OXYGEN SATURATION: 98 % | WEIGHT: 167.8 LBS | RESPIRATION RATE: 17 BRPM | HEART RATE: 72 BPM | HEIGHT: 69 IN

## 2018-06-22 DIAGNOSIS — D72.829 LEUKOCYTOSIS, UNSPECIFIED TYPE: ICD-10-CM

## 2018-06-22 DIAGNOSIS — D62 ACUTE BLOOD LOSS ANEMIA: Primary | ICD-10-CM

## 2018-06-22 DIAGNOSIS — D64.9 ANEMIA, UNSPECIFIED TYPE: ICD-10-CM

## 2018-06-22 DIAGNOSIS — D62 ACUTE BLOOD LOSS ANEMIA: ICD-10-CM

## 2018-06-22 LAB
BASOPHILS # BLD AUTO: 0.14 THOUSANDS/ΜL (ref 0–0.1)
BASOPHILS NFR BLD AUTO: 1 % (ref 0–1)
EOSINOPHIL # BLD AUTO: 0.5 THOUSAND/ΜL (ref 0–0.61)
EOSINOPHIL NFR BLD AUTO: 5 % (ref 0–6)
ERYTHROCYTE [DISTWIDTH] IN BLOOD BY AUTOMATED COUNT: 16.7 % (ref 11.6–15.1)
FERRITIN SERPL-MCNC: 22 NG/ML (ref 8–388)
HCT VFR BLD AUTO: 40.6 % (ref 36.5–49.3)
HGB BLD-MCNC: 13.2 G/DL (ref 12–17)
IRON SATN MFR SERPL: 13 %
IRON SERPL-MCNC: 61 UG/DL (ref 65–175)
LYMPHOCYTES # BLD AUTO: 2.06 THOUSANDS/ΜL (ref 0.6–4.47)
LYMPHOCYTES NFR BLD AUTO: 19 % (ref 14–44)
MCH RBC QN AUTO: 28.3 PG (ref 26.8–34.3)
MCHC RBC AUTO-ENTMCNC: 32.5 G/DL (ref 31.4–37.4)
MCV RBC AUTO: 87 FL (ref 82–98)
MONOCYTES # BLD AUTO: 1.58 THOUSAND/ΜL (ref 0.17–1.22)
MONOCYTES NFR BLD AUTO: 14 % (ref 4–12)
NEUTROPHILS # BLD AUTO: 6.79 THOUSANDS/ΜL (ref 1.85–7.62)
NEUTS SEG NFR BLD AUTO: 61 % (ref 43–75)
PLATELET # BLD AUTO: 376 THOUSANDS/UL (ref 149–390)
PMV BLD AUTO: 8.8 FL (ref 8.9–12.7)
RBC # BLD AUTO: 4.67 MILLION/UL (ref 3.88–5.62)
TIBC SERPL-MCNC: 465 UG/DL (ref 250–450)
WBC # BLD AUTO: 11.07 THOUSAND/UL (ref 4.31–10.16)

## 2018-06-22 PROCEDURE — 85025 COMPLETE CBC W/AUTO DIFF WBC: CPT | Performed by: PHYSICIAN ASSISTANT

## 2018-06-22 PROCEDURE — 83540 ASSAY OF IRON: CPT

## 2018-06-22 PROCEDURE — 82728 ASSAY OF FERRITIN: CPT

## 2018-06-22 PROCEDURE — 99204 OFFICE O/P NEW MOD 45 MIN: CPT | Performed by: PHYSICIAN ASSISTANT

## 2018-06-22 PROCEDURE — 83550 IRON BINDING TEST: CPT

## 2018-06-22 PROCEDURE — 36415 COLL VENOUS BLD VENIPUNCTURE: CPT | Performed by: PHYSICIAN ASSISTANT

## 2018-06-22 NOTE — PROGRESS NOTES
Hematology/Oncology Outpatient Consult  Amy Kessler 79 y o  male 1950 [id number]    Date:  6/22/2018      Assessment and Plan:    1  Acute blood loss anemia  Patient was admitted in April 2018 due to acute blood loss anemia  He states that he was given 3 units of blood during his admission  He was found to have bleeding at the hepatic flexure  This was treated with embolization by Interventional Radiology  He has not had further bleeding  He does not believe that he received any intravenous iron therapy  I did not see this in the records either  He is not on oral iron therapy  We will evaluate CBC and iron panel at this time  CBC in May 2018 did show improvement in hemoglobin  - Iron Panel; Future  - CBC and differential    2  Leukocytosis, unspecified type  Patient's leukocytosis may be secondary to patient's iron deficiency  The commonly see WBC and platelet count elevate in the reactive process to iron deficiency anemia  If patient is still iron deficient, we will replace as needed  Is iron deficiency has resolved and leukocytosis still persist, further evaluation for this will follow  We will call patient on Monday with results to determine further planning     - CBC and differential    HPI:  79year old male presents for consultation for anemia  He was admitted to [facility] in April 2018  He had a recent stent in the left subclavian artery and is on aspirin 100  Has long-standing peripheral arterial disease status post multiple stenting  History of ventricular tachycardia s/p ICD implantation, CAD s/p CABG in 1999  Ischemic cardiomyopathy, paraoxysmal a fib on Xarelto  He presented to [facility] complaining of worsening bilateral lower extremity claudication for the past 2 weeks  Also admitted to bright red blood stools two times daily x 3 weeks  He had hemoglobin of 7  NM scan showed bleeding at the hepatic flexure   Bleeding in the right colon status post SMA coil embolization with IR on 4/23/18  2 additional episodes of rectal bleeding on 4/25 and hemoglobin decreased to 8 3  1 unit PRBC  He had colonoscopy on 4/26/18 which showed area in the ascending colon, suspicious of ischemic colitis possibly due to embolization done earlier  Rectal bleeding stopped  CBC on 4/28/18 showed hemoglobin 10 9  He states he saw Dr Bonnie Villa GI, in follow up and was then told to follow up PRN with them  He has not had any other bleeding episodes since  Smoking for 25 years  1 ppd smoking for 20 years, less in recent years and working on quitting  No further bleeding since hospitalization  ROS: Review of Systems   Constitutional: Positive for appetite change (improved since hospital, gained weight )  Negative for chills, fatigue, fever and unexpected weight change  HENT: Negative for mouth sores and nosebleeds  Respiratory: Negative for cough, chest tightness, shortness of breath and wheezing  Cardiovascular: Negative for chest pain, palpitations and leg swelling  Gastrointestinal: Negative for abdominal pain, blood in stool, constipation, diarrhea, nausea and vomiting  Genitourinary: Negative for difficulty urinating, dysuria and hematuria  Musculoskeletal: Negative for arthralgias, joint swelling and myalgias  Skin: Negative  Neurological: Negative for dizziness, weakness, light-headedness, numbness and headaches         Past Medical History:   Diagnosis Date    Essential hypertension 3/20/2018    Heart disease     Hypertension     Ischemic cardiomyopathy 3/20/2018    Neuropathy     Peripheral arterial disease ([facility] ) 3/20/2018    PVD (peripheral vascular disease) ([facility] )     S/P CABG (coronary artery bypass graft) 3/20/2018    S/P implantation of automatic cardioverter/defibrillator (AICD) 3/20/2018    Tobacco abuse 3/20/2018    Vitamin D deficiency        Past Surgical History:   Procedure Laterality Date    CARDIAC CATHETERIZATION  1999    aterial [id number]-5/29/1999    CARDIAC DEFIBRILLATOR PLACEMENT  2009    implantable tkaztpeuqtlk-amyznxrgxijab-dawbu-April 2009    CORONARY ARTERY BYPASS GRAFT  1999    OTHER SURGICAL HISTORY      cath stent placement       Social History     Social History    Marital status: /Civil Union     Spouse name: N/A    Number of children: N/A    Years of education: N/A     Social History Main Topics    Smoking status: Current Every Day Smoker     Packs/day: 0 50     Types: Cigarettes    Smokeless tobacco: Never Used    Alcohol use Yes      Comment: social    Drug use: No    Sexual activity: Not Asked     Other Topics Concern    None     Social History Narrative    Always uses seat belt    Always uses sunscreen    Caffeine use    Dental care, regularly    Patient has living will       Family History   Problem Relation Age of Onset    Colon polyps Brother         polyps of the sigmoid colon       Allergies   Allergen Reactions    Penicillins          Current Outpatient Prescriptions:     aspirin 81 mg chewable tablet, Chew 81 mg, Disp: , Rfl:     atorvastatin (LIPITOR) 40 mg tablet, Take 1 tablet by mouth daily, Disp: , Rfl:     enalapril (VASOTEC) 5 mg tablet, Take 5 mg by mouth daily  , Disp: , Rfl:     furosemide (LASIX) 20 mg tablet, Take 20 mg by mouth daily  , Disp: , Rfl:     metoprolol succinate (TOPROL-XL) 100 mg 24 hr tablet, Take 100 mg by mouth daily, Disp: , Rfl:     mexiletine (MEXITIL) 150 mg capsule, Take 150 mg by mouth, Disp: , Rfl:     rivaroxaban (XARELTO) 20 mg tablet, Take 1 tablet by mouth daily, Disp: , Rfl:     spironolactone (ALDACTONE) 25 mg tablet, Take 25 mg by mouth daily  , Disp: , Rfl:     potassium chloride (K-DUR,KLOR-CON) 20 mEq tablet, Take 1 tablet (20 mEq total) by mouth daily, Disp: 30 tablet, Rfl: 0      Physical Exam:  BP 98/64 (BP Location: Left arm, Patient Position: Sitting, Cuff Size: Standard)   Pulse 72   Temp (!) 97 °F (36 1 °C) (Tympanic) Resp 17   Ht 5' 9" (1 753 m)   Wt 76 1 kg (167 lb 12 8 oz)   SpO2 98%   BMI 24 78 kg/m²     Physical Exam   Constitutional: He is oriented to person, place, and time  He appears well-developed and well-nourished  No distress  HENT:   Head: Normocephalic and atraumatic  Eyes: Conjunctivae are normal  No scleral icterus  Neck: Normal range of motion  Neck supple  Cardiovascular: Normal rate and normal heart sounds  An irregularly irregular rhythm present  No murmur heard  Pulmonary/Chest: Effort normal and breath sounds normal  No respiratory distress  Abdominal: Soft  There is no tenderness  Musculoskeletal: Normal range of motion  He exhibits no edema or tenderness  Lymphadenopathy:     He has no cervical adenopathy  Neurological: He is alert and oriented to person, place, and time  No cranial nerve deficit  Skin: Skin is warm and dry  Psychiatric: He has a normal mood and affect  Vitals reviewed  Labs:  Lab Results   Component Value Date    WBC 11 07 (H) 06/22/2018    HGB 13 2 06/22/2018    HCT 40 6 06/22/2018    MCV 87 06/22/2018     06/22/2018     Lab Results   Component Value Date     04/18/2018    K 5 2 04/18/2018     (H) 04/18/2018    CO2 25 04/18/2018    ANIONGAP 6 04/18/2018    BUN 28 (H) 04/18/2018    CREATININE 1 32 (H) 04/18/2018    GLUCOSE 96 03/22/2018    GLUF 118 (H) 04/18/2018    CALCIUM 9 2 04/18/2018    AST 26 03/21/2018    ALT 30 03/21/2018    ALKPHOS 85 03/21/2018    PROT 6 8 03/21/2018    BILITOT 0 60 03/21/2018    EGFR 55 04/18/2018       Patient voiced understanding and agreement in the above discussion  Aware to contact our office with questions/symptoms in the interim

## 2018-06-22 NOTE — LETTER
June 22, 2018     Jaxson Keene DO  [address]  [place] [id number]    Patient: Jose Sifuentes   YOB: 1950   Date of Visit: 6/22/2018       Dear Dr Tinnie Olszewski: Thank you for referring Edith Lopes to me for evaluation  Below are my notes for this consultation  If you have questions, please do not hesitate to call me  I look forward to following your patient along with you  Sincerely,        Shira Chambers PA-C        CC: No Recipients  Shira Chambers PA-C  6/22/2018  3:58 PM  Sign at close encounter  Hematology/Oncology Outpatient Consult  Jose Sifuentes 79 y o  male 1950 [id number]    Date:  6/22/2018      Assessment and Plan:    1  Acute blood loss anemia  Patient was admitted in April 2018 due to acute blood loss anemia  He states that he was given 3 units of blood during his admission  He was found to have bleeding at the hepatic flexure  This was treated with embolization by Interventional Radiology  He has not had further bleeding  He does not believe that he received any intravenous iron therapy  I did not see this in the records either  He is not on oral iron therapy  We will evaluate CBC and iron panel at this time  CBC in May 2018 did show improvement in hemoglobin  - Iron Panel; Future  - CBC and differential    2  Leukocytosis, unspecified type  Patient's leukocytosis may be secondary to patient's iron deficiency  The commonly see WBC and platelet count elevate in the reactive process to iron deficiency anemia  If patient is still iron deficient, we will replace as needed  Is iron deficiency has resolved and leukocytosis still persist, further evaluation for this will follow  We will call patient on Monday with results to determine further planning     - CBC and differential    HPI:  79year old male presents for consultation for anemia  He was admitted to [facility] in April 2018     He had a recent stent in the left subclavian artery and is on aspirin 100  Has long-standing peripheral arterial disease status post multiple stenting  History of ventricular tachycardia s/p ICD implantation, CAD s/p CABG in 1999  Ischemic cardiomyopathy, paraoxysmal a fib on Xarelto  He presented to [facility] complaining of worsening bilateral lower extremity claudication for the past 2 weeks  Also admitted to bright red blood stools two times daily x 3 weeks  He had hemoglobin of 7  NM scan showed bleeding at the hepatic flexure  Bleeding in the right colon status post SMA coil embolization with IR on 4/23/18  2 additional episodes of rectal bleeding on 4/25 and hemoglobin decreased to 8 3  1 unit PRBC  He had colonoscopy on 4/26/18 which showed area in the ascending colon, suspicious of ischemic colitis possibly due to embolization done earlier  Rectal bleeding stopped  CBC on 4/28/18 showed hemoglobin 10 9  He states he saw Dr Zahra Nicholas, GI, in follow up and was then told to follow up PRN with them  He has not had any other bleeding episodes since  Smoking for 25 years  1 ppd smoking for 20 years, less in recent years and working on quitting  No further bleeding since hospitalization  ROS: Review of Systems   Constitutional: Positive for appetite change (improved since hospital, gained weight )  Negative for chills, fatigue, fever and unexpected weight change  HENT: Negative for mouth sores and nosebleeds  Respiratory: Negative for cough, chest tightness, shortness of breath and wheezing  Cardiovascular: Negative for chest pain, palpitations and leg swelling  Gastrointestinal: Negative for abdominal pain, blood in stool, constipation, diarrhea, nausea and vomiting  Genitourinary: Negative for difficulty urinating, dysuria and hematuria  Musculoskeletal: Negative for arthralgias, joint swelling and myalgias  Skin: Negative  Neurological: Negative for dizziness, weakness, light-headedness, numbness and headaches  Past Medical History:   Diagnosis Date    Essential hypertension 3/20/2018    Heart disease     Hypertension     Ischemic cardiomyopathy 3/20/2018    Neuropathy     Per[facility] arterial [facility] ) 3/20/2018    PVD (peripheral vascular disease) ([facility] )     S/P CABG (coronary artery bypass graft) 3/20/2018    S/P implantation of automatic cardioverter/defibrillator (AICD) 3/20/2018    Tobacco abuse 3/20/2018    Vitamin D deficiency        Past Surgical History:   Procedure Laterality Date    CARDIAC CATHETERIZATION  1999    aterial wnpbkgvblqdgmug-djyph-1/29/1999    CARDIAC DEFIBRILLATOR PLACEMENT  2009    implantable wkelocgboheg-xuprwdushgxvj-wkzkg-April 2009    CORONARY ARTERY BYPASS GRAFT  1999    OTHER SURGICAL HISTORY      cath stent placement       Social History     Social History    Marital status: /Civil Union     Spouse name: N/A    Number of children: N/A    Years of education: N/A     Social History Main Topics    Smoking status: Current Every Day Smoker     Packs/day: 0 50     Types: Cigarettes    Smokeless tobacco: Never Used    Alcohol use Yes      Comment: social    Drug use: No    Sexual activity: Not Asked     Other Topics Concern    None     Social History Narrative    Always uses seat belt    Always uses sunscreen    Caffeine use    Dental care, regularly    Patient has living will       Family History   Problem Relation Age of Onset    Colon polyps Brother         polyps of the sigmoid colon       Allergies   Allergen Reactions    Penicillins          Current Outpatient Prescriptions:     aspirin 81 mg chewable tablet, Chew 81 mg, Disp: , Rfl:     atorvastatin (LIPITOR) 40 mg tablet, Take 1 tablet by mouth daily, Disp: , Rfl:     enalapril (VASOTEC) 5 mg tablet, Take 5 mg by mouth daily  , Disp: , Rfl:     furosemide (LASIX) 20 mg tablet, Take 20 mg by mouth daily  , Disp: , Rfl:     metoprolol succinate (TOPROL-XL) 100 mg 24 hr tablet, Take 100 mg by mouth daily, Disp: , Rfl:     mexiletine (MEXITIL) 150 mg capsule, Take 150 mg by mouth, Disp: , Rfl:     rivaroxaban (XARELTO) 20 mg tablet, Take 1 tablet by mouth daily, Disp: , Rfl:     spironolactone (ALDACTONE) 25 mg tablet, Take 25 mg by mouth daily  , Disp: , Rfl:     potassium chloride (K-DUR,KLOR-CON) 20 mEq tablet, Take 1 tablet (20 mEq total) by mouth daily, Disp: 30 tablet, Rfl: 0      Physical Exam:  BP 98/64 (BP Location: Left arm, Patient Position: Sitting, Cuff Size: Standard)   Pulse 72   Temp (!) 97 °F (36 1 °C) (Tympanic)   Resp 17   Ht 5' 9" (1 753 m)   Wt 76 1 kg (167 lb 12 8 oz)   SpO2 98%   BMI 24 78 kg/m²      Physical Exam   Constitutional: He is oriented to person, place, and time  He appears well-developed and well-nourished  No distress  HENT:   Head: Normocephalic and atraumatic  Eyes: Conjunctivae are normal  No scleral icterus  Neck: Normal range of motion  Neck supple  Cardiovascular: Normal rate and normal heart sounds  An irregularly irregular rhythm present  No murmur heard  Pulmonary/Chest: Effort normal and breath sounds normal  No respiratory distress  Abdominal: Soft  There is no tenderness  Musculoskeletal: Normal range of motion  He exhibits no edema or tenderness  Lymphadenopathy:     He has no cervical adenopathy  Neurological: He is alert and oriented to person, place, and time  No cranial nerve deficit  Skin: Skin is warm and dry  Psychiatric: He has a normal mood and affect  Vitals reviewed          Labs:  Lab Results   Component Value Date    WBC 11 07 (H) 06/22/2018    HGB 13 2 06/22/2018    HCT 40 6 06/22/2018    MCV 87 06/22/2018     06/22/2018     Lab Results   Component Value Date     04/18/2018    K 5 2 04/18/2018     (H) 04/18/2018    CO2 25 04/18/2018    ANIONGAP 6 04/18/2018    BUN 28 (H) 04/18/2018    CREATININE 1 32 (H) 04/18/2018    GLUCOSE 96 03/22/2018    GLUF 118 (H) 04/18/2018    CALCIUM 9 2 04/18/2018    AST 26 03/21/2018    ALT 30 03/21/2018    ALKPHOS 85 03/21/2018    PROT 6 8 03/21/2018    BILITOT 0 60 03/21/2018    EGFR 55 04/18/2018       Patient voiced understanding and agreement in the above discussion  Aware to contact our office with questions/symptoms in the interim

## 2018-06-25 ENCOUNTER — TELEPHONE (OUTPATIENT)
Dept: HEMATOLOGY ONCOLOGY | Facility: CLINIC | Age: 68
End: 2018-06-25

## 2018-06-25 DIAGNOSIS — D62 ACUTE BLOOD LOSS ANEMIA: Primary | ICD-10-CM

## 2018-06-25 NOTE — TELEPHONE ENCOUNTER
Reviewed labs with patient  Iron panel does still show mild iron deficiency  Recommended oral iron once daily x 2 months  If constipated, recommend stool softener  May cause stools to be black  Follow up in 2 months with repeat labs

## 2018-07-12 DIAGNOSIS — R60.9 EDEMA, UNSPECIFIED TYPE: ICD-10-CM

## 2018-07-12 DIAGNOSIS — I48.91 ATRIAL FIBRILLATION, UNSPECIFIED TYPE (HCC): ICD-10-CM

## 2018-07-12 DIAGNOSIS — I25.810 CORONARY ARTERY DISEASE INVOLVING CORONARY BYPASS GRAFT OF NATIVE HEART WITHOUT ANGINA PECTORIS: Primary | ICD-10-CM

## 2018-07-12 DIAGNOSIS — I10 HYPERTENSION, UNSPECIFIED TYPE: ICD-10-CM

## 2018-07-12 RX ORDER — FUROSEMIDE 20 MG/1
20 TABLET ORAL DAILY
Qty: 90 TABLET | Refills: 3 | Status: SHIPPED | OUTPATIENT
Start: 2018-07-12 | End: 2020-01-01

## 2018-07-12 RX ORDER — SPIRONOLACTONE 25 MG/1
25 TABLET ORAL DAILY
Qty: 90 TABLET | Refills: 3 | Status: SHIPPED | OUTPATIENT
Start: 2018-07-12 | End: 2020-01-01

## 2018-07-12 RX ORDER — METOPROLOL SUCCINATE 100 MG/1
100 TABLET, EXTENDED RELEASE ORAL DAILY
Qty: 90 TABLET | Refills: 3 | Status: SHIPPED | OUTPATIENT
Start: 2018-07-12 | End: 2019-04-01 | Stop reason: SDUPTHER

## 2018-08-10 PROBLEM — I25.9 CHRONIC ISCHEMIC HEART DISEASE: Status: ACTIVE | Noted: 2018-08-10

## 2018-08-10 PROBLEM — I48.20 CHRONIC ATRIAL FIBRILLATION (HCC): Status: ACTIVE | Noted: 2018-08-10

## 2018-08-10 PROBLEM — I25.10 CORONARY ARTERIOSCLEROSIS IN NATIVE ARTERY: Status: ACTIVE | Noted: 2018-08-10

## 2018-08-10 PROBLEM — E78.5 DYSLIPIDEMIA: Status: ACTIVE | Noted: 2018-08-10

## 2018-08-10 PROBLEM — Z98.61 CORONARY ANGIOPLASTY STATUS: Status: ACTIVE | Noted: 2018-08-10

## 2018-08-10 PROBLEM — F17.200 NICOTINE DEPENDENCE, UNSPECIFIED, UNCOMPLICATED: Status: ACTIVE | Noted: 2018-08-10

## 2018-08-10 PROBLEM — E66.3 OVER WEIGHT: Status: ACTIVE | Noted: 2018-08-10

## 2018-08-10 PROBLEM — I50.9 CHF (CONGESTIVE HEART FAILURE) (HCC): Status: ACTIVE | Noted: 2018-08-10

## 2018-08-10 PROBLEM — I35.9 AORTIC VALVE DISORDER: Status: ACTIVE | Noted: 2018-08-10

## 2018-08-10 PROBLEM — E78.2 MIXED HYPERLIPIDEMIA: Status: ACTIVE | Noted: 2018-08-10

## 2018-08-10 PROBLEM — I65.29 OCCLUSION AND STENOSIS OF UNSPECIFIED CAROTID ARTERY: Status: ACTIVE | Noted: 2018-08-10

## 2018-08-10 PROBLEM — R09.89 LEFT CAROTID BRUIT: Status: ACTIVE | Noted: 2018-08-10

## 2018-08-10 RX ORDER — ISOSORBIDE MONONITRATE 60 MG/1
60 TABLET, EXTENDED RELEASE ORAL DAILY
COMMUNITY
End: 2019-04-01

## 2018-08-10 RX ORDER — NITROGLYCERIN 0.4 MG/1
0.4 TABLET SUBLINGUAL
COMMUNITY
End: 2019-03-25 | Stop reason: SDUPTHER

## 2018-08-10 RX ORDER — CLOPIDOGREL BISULFATE 75 MG/1
75 TABLET ORAL DAILY
COMMUNITY
End: 2018-11-15 | Stop reason: CLARIF

## 2018-08-10 RX ORDER — ERGOCALCIFEROL (VITAMIN D2) 1250 MCG
50000 CAPSULE ORAL WEEKLY
COMMUNITY
End: 2019-04-01

## 2018-08-10 RX ORDER — DIGOXIN 125 MCG
125 TABLET ORAL DAILY
COMMUNITY
End: 2019-04-01

## 2018-08-23 ENCOUNTER — APPOINTMENT (OUTPATIENT)
Dept: LAB | Facility: MEDICAL CENTER | Age: 68
End: 2018-08-23
Payer: MEDICARE

## 2018-08-23 ENCOUNTER — IN-CLINIC DEVICE VISIT (OUTPATIENT)
Dept: CARDIOLOGY CLINIC | Facility: CLINIC | Age: 68
End: 2018-08-23
Payer: MEDICARE

## 2018-08-23 DIAGNOSIS — I25.5 ISCHEMIC CARDIOMYOPATHY: Primary | ICD-10-CM

## 2018-08-23 DIAGNOSIS — D62 ACUTE BLOOD LOSS ANEMIA: ICD-10-CM

## 2018-08-23 DIAGNOSIS — Z45.02 ENCOUNTER FOR CHECKING OF AUTOMATIC IMPLANTABLE CARDIOVERTER-DEFIBRILLATOR (AICD): ICD-10-CM

## 2018-08-23 LAB
BASOPHILS # BLD AUTO: 0.12 THOUSANDS/ΜL (ref 0–0.1)
BASOPHILS NFR BLD AUTO: 1 % (ref 0–1)
EOSINOPHIL # BLD AUTO: 0.32 THOUSAND/ΜL (ref 0–0.61)
EOSINOPHIL NFR BLD AUTO: 3 % (ref 0–6)
ERYTHROCYTE [DISTWIDTH] IN BLOOD BY AUTOMATED COUNT: 20.9 % (ref 11.6–15.1)
HCT VFR BLD AUTO: 47.8 % (ref 36.5–49.3)
HGB BLD-MCNC: 15.3 G/DL (ref 12–17)
IMM GRANULOCYTES # BLD AUTO: 0.08 THOUSAND/UL (ref 0–0.2)
IMM GRANULOCYTES NFR BLD AUTO: 1 % (ref 0–2)
LYMPHOCYTES # BLD AUTO: 2.57 THOUSANDS/ΜL (ref 0.6–4.47)
LYMPHOCYTES NFR BLD AUTO: 24 % (ref 14–44)
MCH RBC QN AUTO: 29.1 PG (ref 26.8–34.3)
MCHC RBC AUTO-ENTMCNC: 32 G/DL (ref 31.4–37.4)
MCV RBC AUTO: 91 FL (ref 82–98)
MONOCYTES # BLD AUTO: 1.45 THOUSAND/ΜL (ref 0.17–1.22)
MONOCYTES NFR BLD AUTO: 14 % (ref 4–12)
NEUTROPHILS # BLD AUTO: 6.1 THOUSANDS/ΜL (ref 1.85–7.62)
NEUTS SEG NFR BLD AUTO: 57 % (ref 43–75)
NRBC BLD AUTO-RTO: 0 /100 WBCS
PLATELET # BLD AUTO: 287 THOUSANDS/UL (ref 149–390)
PMV BLD AUTO: 10 FL (ref 8.9–12.7)
RBC # BLD AUTO: 5.26 MILLION/UL (ref 3.88–5.62)
WBC # BLD AUTO: 10.64 THOUSAND/UL (ref 4.31–10.16)

## 2018-08-23 PROCEDURE — 83550 IRON BINDING TEST: CPT

## 2018-08-23 PROCEDURE — 93289 INTERROG DEVICE EVAL HEART: CPT | Performed by: INTERNAL MEDICINE

## 2018-08-23 PROCEDURE — 83540 ASSAY OF IRON: CPT

## 2018-08-23 PROCEDURE — 85025 COMPLETE CBC W/AUTO DIFF WBC: CPT | Performed by: PHYSICIAN ASSISTANT

## 2018-08-23 PROCEDURE — 36415 COLL VENOUS BLD VENIPUNCTURE: CPT | Performed by: PHYSICIAN ASSISTANT

## 2018-08-23 PROCEDURE — 82728 ASSAY OF FERRITIN: CPT

## 2018-08-23 NOTE — PROGRESS NOTES
device check      Current Outpatient Prescriptions:     aspirin 81 mg chewable tablet, Chew 81 mg, Disp: , Rfl:     atorvastatin (LIPITOR) 40 mg tablet, Take 1 tablet by mouth daily, Disp: , Rfl:     clopidogrel (PLAVIX) 75 mg tablet, Take 75 mg by mouth daily, Disp: , Rfl:     digoxin (LANOXIN) 0 125 mg tablet, Take 125 mcg by mouth daily, Disp: , Rfl:     enalapril (VASOTEC) 5 mg tablet, Take 5 mg by mouth daily  , Disp: , Rfl:     ergocalciferol (ERGOCALCIFEROL) 19240 units capsule, Take 50,000 Units by mouth once a week, Disp: , Rfl:     furosemide (LASIX) 20 mg tablet, Take 1 tablet (20 mg total) by mouth daily, Disp: 90 tablet, Rfl: 3    isosorbide mononitrate (IMDUR) 60 mg 24 hr tablet, Take 60 mg by mouth daily In the morning, Disp: , Rfl:     metoprolol succinate (TOPROL-XL) 100 mg 24 hr tablet, Take 1 tablet (100 mg total) by mouth daily, Disp: 90 tablet, Rfl: 3    mexiletine (MEXITIL) 150 mg capsule, Take 150 mg by mouth, Disp: , Rfl:     nitroglycerin (NITROSTAT) 0 4 mg SL tablet, Place 0 4 mg under the tongue At the first sign of attack; take as directed, Disp: , Rfl:     potassium chloride (K-DUR,KLOR-CON) 20 mEq tablet, Take 1 tablet (20 mEq total) by mouth daily, Disp: 30 tablet, Rfl: 0    rivaroxaban (XARELTO) 20 mg tablet, Take 1 tablet by mouth daily, Disp: , Rfl:     spironolactone (ALDACTONE) 25 mg tablet, Take 1 tablet (25 mg total) by mouth daily, Disp: 90 tablet, Rfl: 3

## 2018-08-29 ENCOUNTER — HOSPITAL ENCOUNTER (EMERGENCY)
Facility: HOSPITAL | Age: 68
Discharge: HOME | End: 2018-08-30
Attending: EMERGENCY MEDICINE
Payer: MEDICARE

## 2018-08-29 DIAGNOSIS — S91.109A AVULSION OF SKIN OF TOE, INITIAL ENCOUNTER: ICD-10-CM

## 2018-08-29 DIAGNOSIS — S91.109S OPEN TOE WOUND, SEQUELA: Primary | ICD-10-CM

## 2018-08-30 ENCOUNTER — TELEPHONE (OUTPATIENT)
Dept: HEMATOLOGY ONCOLOGY | Facility: CLINIC | Age: 68
End: 2018-08-30

## 2018-08-30 ENCOUNTER — TRANSCRIBE ORDERS (OUTPATIENT)
Dept: LAB | Facility: MEDICAL CENTER | Age: 68
End: 2018-08-30

## 2018-08-30 VITALS
WEIGHT: 172.84 LBS | HEART RATE: 67 BPM | OXYGEN SATURATION: 98 % | SYSTOLIC BLOOD PRESSURE: 124 MMHG | BODY MASS INDEX: 25.52 KG/M2 | RESPIRATION RATE: 18 BRPM | TEMPERATURE: 97.8 F | DIASTOLIC BLOOD PRESSURE: 61 MMHG

## 2018-08-30 DIAGNOSIS — D62 ACUTE BLOOD LOSS ANEMIA: Primary | ICD-10-CM

## 2018-08-30 LAB
APTT PPP: 34 SECONDS (ref 24–36)
BASOPHILS # BLD AUTO: 0.07 THOUSANDS/ΜL (ref 0–0.1)
BASOPHILS NFR BLD AUTO: 1 % (ref 0–1)
EOSINOPHIL # BLD AUTO: 0.59 THOUSAND/ΜL (ref 0–0.61)
EOSINOPHIL NFR BLD AUTO: 5 % (ref 0–6)
ERYTHROCYTE [DISTWIDTH] IN BLOOD BY AUTOMATED COUNT: 20.3 % (ref 11.6–15.1)
FERRITIN SERPL-MCNC: 64 NG/ML (ref 8–388)
HCT VFR BLD AUTO: 49.2 % (ref 36.5–49.3)
HGB BLD-MCNC: 15.8 G/DL (ref 12–17)
IMM GRANULOCYTES # BLD AUTO: 0.06 THOUSAND/UL (ref 0–0.2)
IMM GRANULOCYTES NFR BLD AUTO: 1 % (ref 0–2)
INR PPP: 1.38 (ref 0.86–1.17)
IRON SATN MFR SERPL: 29 %
IRON SERPL-MCNC: 112 UG/DL (ref 65–175)
LYMPHOCYTES # BLD AUTO: 3.91 THOUSANDS/ΜL (ref 0.6–4.47)
LYMPHOCYTES NFR BLD AUTO: 35 % (ref 14–44)
MCH RBC QN AUTO: 28.5 PG (ref 26.8–34.3)
MCHC RBC AUTO-ENTMCNC: 32.1 G/DL (ref 31.4–37.4)
MCV RBC AUTO: 89 FL (ref 82–98)
MONOCYTES # BLD AUTO: 1.5 THOUSAND/ΜL (ref 0.17–1.22)
MONOCYTES NFR BLD AUTO: 13 % (ref 4–12)
NEUTROPHILS # BLD AUTO: 5.18 THOUSANDS/ΜL (ref 1.85–7.62)
NEUTS SEG NFR BLD AUTO: 45 % (ref 43–75)
NRBC BLD AUTO-RTO: 0 /100 WBCS
PLATELET # BLD AUTO: 259 THOUSANDS/UL (ref 149–390)
PMV BLD AUTO: 8.7 FL (ref 8.9–12.7)
PROTHROMBIN TIME: 16.3 SECONDS (ref 11.8–14.2)
RBC # BLD AUTO: 5.54 MILLION/UL (ref 3.88–5.62)
TIBC SERPL-MCNC: 389 UG/DL (ref 250–450)
WBC # BLD AUTO: 11.31 THOUSAND/UL (ref 4.31–10.16)

## 2018-08-30 PROCEDURE — 85730 THROMBOPLASTIN TIME PARTIAL: CPT | Performed by: EMERGENCY MEDICINE

## 2018-08-30 PROCEDURE — 85610 PROTHROMBIN TIME: CPT | Performed by: EMERGENCY MEDICINE

## 2018-08-30 PROCEDURE — 85025 COMPLETE CBC W/AUTO DIFF WBC: CPT | Performed by: EMERGENCY MEDICINE

## 2018-08-30 PROCEDURE — 99283 EMERGENCY DEPT VISIT LOW MDM: CPT

## 2018-08-30 PROCEDURE — 36415 COLL VENOUS BLD VENIPUNCTURE: CPT | Performed by: EMERGENCY MEDICINE

## 2018-08-30 NOTE — ED PROVIDER NOTES
History  Chief Complaint   Patient presents with    Toe Injury     PAtient states he was cutting his toe nails on Monday (8/27)  Patient states he cut the skin off of his fifth toe on his right foot  Patient states he is on blood thinners and the toe is still bleeding  Pt gives hx of cutting Toe nails "by the light of the TV' on 8/27  Pt cut skin at tip of 5th right toe just behind nail  Pt has had continued bleeding since then  Pt is on Eliquis  Pt last changed bandage 24 hrs ago and has been standing all day bartending  Pt presents with bloody dressing  At area  Pt has neuropathy and denies pain / numbness  PMH  CAD/CABG; HTN; Neuropathy; COPD; Has defibrilator    Pt relates has upcoming appt with hematology for elevated WBC  No known clotting disorder or platelet problem        History provided by:  Patient  Laceration   Location:  Toe  Toe laceration location:  R little toe  Depth: Through dermis  Bleeding: venous and controlled with pressure    Pain details:     Severity:  No pain    Timing:  Intermittent  Foreign body present:  No foreign bodies  Worsened by: Movement  Associated symptoms: no fever, no rash, no redness, no swelling and no streaking        Prior to Admission Medications   Prescriptions Last Dose Informant Patient Reported?  Taking?   aspirin 81 mg chewable tablet  Self Yes No   Sig: Chew 81 mg   atorvastatin (LIPITOR) 40 mg tablet  Self Yes No   Sig: Take 1 tablet by mouth daily   clopidogrel (PLAVIX) 75 mg tablet   Yes No   Sig: Take 75 mg by mouth daily   digoxin (LANOXIN) 0 125 mg tablet   Yes No   Sig: Take 125 mcg by mouth daily   enalapril (VASOTEC) 5 mg tablet  Self Yes No   Sig: Take 5 mg by mouth daily     ergocalciferol (ERGOCALCIFEROL) 42110 units capsule   Yes No   Sig: Take 50,000 Units by mouth once a week   furosemide (LASIX) 20 mg tablet   No No   Sig: Take 1 tablet (20 mg total) by mouth daily   isosorbide mononitrate (IMDUR) 60 mg 24 hr tablet   Yes No   Sig: Take 60 mg by mouth daily In the morning   metoprolol succinate (TOPROL-XL) 100 mg 24 hr tablet   No No   Sig: Take 1 tablet (100 mg total) by mouth daily   mexiletine (MEXITIL) 150 mg capsule  Self Yes No   Sig: Take 150 mg by mouth   nitroglycerin (NITROSTAT) 0 4 mg SL tablet   Yes No   Sig: Place 0 4 mg under the tongue At the first sign of attack; take as directed   potassium chloride (K-DUR,KLOR-CON) 20 mEq tablet  Self No No   Sig: Take 1 tablet (20 mEq total) by mouth daily   rivaroxaban (XARELTO) 20 mg tablet  Self Yes No   Sig: Take 1 tablet by mouth daily   spironolactone (ALDACTONE) 25 mg tablet   No No   Sig: Take 1 tablet (25 mg total) by mouth daily      Facility-Administered Medications: None       Past Medical History:   Diagnosis Date    CAD (coronary artery disease)     Chronic atrial fibrillation (HCC)     Coronary angioplasty status     Essential hypertension 3/20/2018    Heart disease     Hx of echocardiogram 03/11/2015    EF0 30 (55%) Mod LV systolic dysfunction  Mild concentric LVH  Trace tricuspid regurg w/o significant pulmo htn  Pacer wire traversing the RA/RV / ICE ejection; EF 0 25 (25%) severe segmental LV systolic dysfunction  Mild concentric LV hypertrophy  Mild MR  Mild pulm htn  8/25/17      Hx of myocardial perfusion scan 09/20/2016    Lexiscan MPI; EF0 24 (24%) prior basal inferior, posterobasal, basal septal myocardial infarctions  Small area of basal lateral wall ischemia by perfusion imaging      Hypertension     Ischemic cardiomyopathy 3/20/2018    Ischemic cardiomyopathy     Mixed hyperlipidemia     Neuropathy     Occlusion and stenosis of unspecified carotid artery     Peripheral arterial disease ([facility] ) 3/20/2018    PVD (peripheral vascular disease) ([facility] )     S/P CABG (coronary artery bypass graft) 3/20/2018    S/P implantation of automatic cardioverter/defibrillator (AICD) 3/20/2018    Tobacco abuse 03/20/2018    nicotine dependence    Ventricular tachycardia ([facility] )     Vitamin D deficiency        Past Surgical History:   Procedure Laterality Date    CARDIAC CATHETERIZATION  1999    aterial aeeuxbsbzpkwqir-vfthi-2/29/1999  / EF0 35 (35%) Multivessel CAD no intervention with medical management recommended  Graft to distal LAD: occluded SVG  Graft to D1: occluded SVG  Graft to OM1:SVG 6/8/17  / Known triple vessel disease  Patent LIMA and SVG to OM 3/30/18    CARDIAC DEFIBRILLATOR PLACEMENT  2009    implantable varrtmjkypvv-awegvquwktdkz-mfwyj-April 2009    CORONARY ARTERY BYPASS GRAFT  1999    LIMA-LAD, SVG-Diag, SVG-OM, SVG-Circ PL    OTHER SURGICAL HISTORY      cath stent placement       Family History   Problem Relation Age of Onset    Colon polyps Brother         polyps of the sigmoid colon    Coronary artery disease Family         Less than 61 yrs of age     I have reviewed and agree with the history as documented  Social History   Substance Use Topics    Smoking status: Current Every Day Smoker     Packs/day: 0 50     Types: Cigarettes    Smokeless tobacco: Never Used    Alcohol use Yes      Comment: social        Review of Systems   Constitutional: Negative  Negative for activity change, appetite change, chills, diaphoresis and fever  HENT: Negative  Eyes: Negative  Respiratory: Negative  Negative for chest tightness and shortness of breath  Cardiovascular: Negative  Negative for chest pain and leg swelling  Gastrointestinal: Negative  Negative for abdominal pain and nausea  Genitourinary: Negative  Musculoskeletal: Negative  Negative for arthralgias, gait problem, joint swelling, myalgias and neck stiffness  Skin: Positive for wound  Negative for pallor and rash  Neurological: Negative  Negative for dizziness and light-headedness  Psychiatric/Behavioral: Negative  All other systems reviewed and are negative  Physical Exam  Physical Exam   Constitutional: He is oriented to person, place, and time   He appears well-developed and well-nourished  He is active and cooperative  Non-toxic appearance  He does not have a sickly appearance  He does not appear ill  No distress  HENT:   Head: Normocephalic and atraumatic  Mouth/Throat: Uvula is midline, oropharynx is clear and moist and mucous membranes are normal    Neck: Normal range of motion  Neck supple  No spinous process tenderness present  Cardiovascular: Normal rate and regular rhythm  No calf tenderness or perf edema   Pulmonary/Chest: Effort normal  No respiratory distress  He has no wheezes  He has no rhonchi  He has no rales  Abdominal: Soft  There is no tenderness  There is no rigidity and no guarding  Musculoskeletal:        Right foot: There is laceration  There is normal range of motion, no tenderness and no crepitus  Feet:    Neurological: He is alert and oriented to person, place, and time  He has normal strength  No cranial nerve deficit  He displays a negative Romberg sign  Skin: Skin is warm and dry  No petechiae and no rash noted  He is not diaphoretic  No cyanosis  Nails show clubbing  Psychiatric: He has a normal mood and affect  His speech is normal and behavior is normal  Thought content normal  Cognition and memory are normal    Vitals reviewed        Vital Signs  ED Triage Vitals [08/29/18 2257]   Temperature Pulse Respirations Blood Pressure SpO2   97 8 °F (36 6 °C) 62 16 119/63 98 %      Temp Source Heart Rate Source Patient Position - Orthostatic VS BP Location FiO2 (%)   Temporal Monitor Sitting Right arm --      Pain Score       No Pain           Vitals:    08/29/18 2257 08/30/18 0105   BP: 119/63 124/61   Pulse: 62 67   Patient Position - Orthostatic VS: Sitting Lying       Visual Acuity      ED Medications  Medications - No data to display    Diagnostic Studies  Results Reviewed     Procedure Component Value Units Date/Time    Protime-INR [[id number]]  (Abnormal) Collected:  08/30/18 0021    Lab Status:  Final result Specimen:  Blood from Arm, Left Updated:  08/30/18 0039     Protime 16 3 (H) seconds      INR 1 38 (H)    APTT [[id number]]  (Normal) Collected:  08/30/18 0021    Lab Status:  Final result Specimen:  Blood from Arm, Left Updated:  08/30/18 0039     PTT 34 seconds     CBC and differential [[id number]]  (Abnormal) Collected:  08/30/18 0021    Lab Status:  Final result Specimen:  Blood from Arm, Left Updated:  08/30/18 0026     WBC 11 31 (H) Thousand/uL      RBC 5 54 Million/uL      Hemoglobin 15 8 g/dL      Hematocrit 49 2 %      MCV 89 fL      MCH 28 5 pg      MCHC 32 1 g/dL      RDW 20 3 (H) %      MPV 8 7 (L) fL      Platelets 314 Thousands/uL      nRBC 0 /100 WBCs      Neutrophils Relative 45 %      Immat GRANS % 1 %      Lymphocytes Relative 35 %      Monocytes Relative 13 (H) %      Eosinophils Relative 5 %      Basophils Relative 1 %      Neutrophils Absolute 5 18 Thousands/µL      Immature Grans Absolute 0 06 Thousand/uL      Lymphocytes Absolute 3 91 Thousands/µL      Monocytes Absolute 1 50 (H) Thousand/µL      Eosinophils Absolute 0 59 Thousand/µL      Basophils Absolute 0 07 Thousands/µL                  No orders to display              Procedures  Lac Repair  Date/Time: 8/30/2018 12:47 AM  Performed by: Bereket Montez  Authorized by: Bereket Montez   Consent: Verbal consent obtained  Risks and benefits: risks, benefits and alternatives were discussed  Consent given by: patient  Patient understanding: patient states understanding of the procedure being performed  Required items: required blood products, implants, devices, and special equipment available  Patient identity confirmed: verbally with patient and arm band  Time out: Immediately prior to procedure a "time out" was called to verify the correct patient, procedure, equipment, support staff and site/side marked as required    Body area: lower extremity  Location details: right little toe  Tendon involvement: none    Sedation:  Patient sedated: no      Procedure Details:  Skin closure: glue  Technique: simple  Comments: Applied Hystocryl to area              Phone Contacts  ED Phone Contact    ED Course  ED Course as of Aug 30 0325   Thu Aug 30, 2018   0029 WBC: (!) 11 31   0029 Hemoglobin: 15 8   0029 HCT: 49 2   0029 Platelets: 508   1267 Discussed elevation and not standing on foot for long periods of time  0044 INR: (!) 1 38   0045 Protime: (!) 16 3   0045 PTT: 34                               MDM  CritCare Time    Disposition  Final diagnoses:   Open toe wound, sequela   Avulsion of skin of toe, initial encounter     Time reflects when diagnosis was documented in both MDM as applicable and the Disposition within this note     Time User Action Codes Description Comment    8/30/2018 12:45 AM Era Motleyals Add [S91 109S] Open toe wound, sequela     8/30/2018 12:50 AM Era Darien Add [S91 109A] Avulsion of skin of toe, initial encounter       ED Disposition     ED Disposition Condition Comment    Discharge  [address] discharge to home/self care      Condition at discharge: Stable        Follow-up Information     Follow up With Specialties Details Why Contact Info Additional Omar, DO Internal Medicine   [address] 1  [facility] [place] [address] [facility] [address]       [facility] Emergency Department Emergency Medicine   [facility] [address] [address]  [phone] [place] ED, [address],   Neil Hoffman 26, DO Internal Medicine   [address] [place] [id number] [address],  [place] [address] [phone]             Discharge Medication List as of 8/30/2018 12:52 AM      CONTINUE these medications which have NOT CHANGED    Details   aspirin 81 mg chewable tablet Chew 81 mg, Starting Sat 3/24/2018, Historical Med      atorvastatin (LIPITOR) 40 mg tablet Take 1 tablet by mouth daily, Historical Med      clopidogrel (PLAVIX) 75 mg tablet Take 75 mg by mouth daily, Historical Med      digoxin (LANOXIN) 0 125 mg tablet Take 125 mcg by mouth daily, Historical Med      enalapril (VASOTEC) 5 mg tablet Take 5 mg by mouth daily  , Starting Thu 10/13/2011, Historical Med      ergocalciferol (ERGOCALCIFEROL) 33538 units capsule Take 50,000 Units by mouth once a week, Historical Med      furosemide (LASIX) 20 mg tablet Take 1 tablet (20 mg total) by mouth daily, Starting Thu 7/12/2018, Normal      isosorbide mononitrate (IMDUR) 60 mg 24 hr tablet Take 60 mg by mouth daily In the morning, Historical Med      metoprolol succinate (TOPROL-XL) 100 mg 24 hr tablet Take 1 tablet (100 mg total) by mouth daily, Starting Thu 7/12/2018, Normal      mexiletine (MEXITIL) 150 mg capsule Take 150 mg by mouth, Starting Sat 3/24/2018, Until Fri 6/22/2018, Historical Med      nitroglycerin (NITROSTAT) 0 4 mg SL tablet Place 0 4 mg under the tongue At the first sign of attack; take as directed, Historical Med      potassium chloride (K-DUR,KLOR-CON) 20 mEq tablet Take 1 tablet (20 mEq total) by mouth daily, Starting Tue 4/17/2018, Normal      rivaroxaban (XARELTO) 20 mg tablet Take 1 tablet by mouth daily, Historical Med      spironolactone (ALDACTONE) 25 mg tablet Take 1 tablet (25 mg total) by mouth daily, Starting Thu 7/12/2018, Normal           No discharge procedures on file      ED Provider  Electronically Signed by           Ivett Thompson DO  08/30/18 7182

## 2018-08-30 NOTE — TELEPHONE ENCOUNTER
Called patient because the Iron Panel that was ordered for him to have done for his appointment tomorrow was not completed  I asked him to go back to the lab today to have it done  He will be going to the lab in [place]

## 2018-08-30 NOTE — ED NOTES
Wound bandage  Patient provided with clean socks and surgical boot        Kortney Finley RN  08/30/18 3074

## 2018-08-30 NOTE — DISCHARGE INSTRUCTIONS
Keep foot elevated as much as possible next 24 hrs  Keep bandage on 12 hrs and may use Band-Aid if needed after that  Follow instructions for "skin glue"  If any sign of infection be seen right away  If bleeding reoccurs- hold pressure 20 min and elevate  Skin Adhesive Care   WHAT YOU NEED TO KNOW:   Skin adhesive is medical glue used to close wounds  It is a substitute for staples and stitches  Skin adhesive wound closures take less time and do not require anesthesia  You have less pain and a lower risk of infection than with staples or stitches  Skin adhesive will fall off after the wound is healed  DISCHARGE INSTRUCTIONS:   Self-care:   · Keep your wound clean and dry  for 1 to 5 days  You can shower 24 hours after the skin adhesive is applied  Lightly pat your wound dry after you shower  · Do not soak  your wound in water, such as in a bath or hot tub  · Do not scrub  your wound or pick at the adhesive  This can make your wound reopen  · Do not apply ointments  to your wound  These include antibiotic and other ointments that contain petroleum jelly  These products will remove skin adhesive and reopen your wound  Follow up with your healthcare provider as directed:  Write down your questions so you remember to ask them during your visits  Contact your healthcare provider if:   · You have a fever  · Your wound is red and warm to touch  · You have questions or concerns about your condition or care  Seek care immediately if:   · Your wound has fluid draining from it  · Your wound opens  © 2017 [address]  Information is for End User's use only and may not be sold, redistributed or otherwise used for commercial purposes  All illustrations and images included in CareNotes® are the copyrighted property of [employer]  or Patrice Yin  The above information is an  only   It is not intended as medical advice for individual conditions or treatments  Talk to your doctor, nurse or pharmacist before following any medical regimen to see if it is safe and effective for you  Laceration Without Closure   WHAT YOU NEED TO KNOW:   Your laceration has gone past the time to be closed  Lacerations in areas of poor blood flow usually need to be closed within 8 hours  In areas with normal blood flow, lacerations usually need to be closed within 12 hours  Facial lacerations need to be closed within 24 hours  Your laceration has been cleaned and a dressing has been applied  Your laceration will heal on its own without sutures, staples, or other closure devices  DISCHARGE INSTRUCTIONS:   Return to the emergency department if:   · You have redness, pain, or fever that gets worse quickly  · Your wound has a bad smell or has pus draining from it  · You have bleeding that does not stop after 10 minutes of holding firm, direct pressure on your wound  Contact your healthcare provider if:  You have questions or concerns about your condition or care  Wound care:   · Keep the wound dry for the first 24 to 48 hours  or as directed  Wash your hands with soap and warm water before and after you care for your wound  After that, gently clean the wound once or twice a day with cool water  Use soap to clean around the wound, but try not to get any on the wound edges  Do not use alcohol or hydrogen peroxide to clean your wound unless you are directed to do so  · Leave your bandage on as long as directed  Bandages keep your wound clean and protected  They can also prevent swelling  Ask how to change and how often to change your bandage  Ask if you should apply antibacterial ointment  Be careful not to wrap the bandage or tape too tightly  This could cut off blood flow and cause more injury  Change your bandages when they get wet or dirty    Follow up with your healthcare provider within 2 days or as directed:  Write down your questions so you remember to ask them during your visits  © 2017 [address] [employer] Information is for End User's use only and may not be sold, redistributed or otherwise used for commercial purposes  All illustrations and images included in CareNotes® are the copyrighted property of A D [employer] M [employer]  or Patrice Yin  The above information is an  only  It is not intended as medical advice for individual conditions or treatments  Talk to your doctor, nurse or pharmacist before following any medical regimen to see if it is safe and effective for you

## 2018-08-31 ENCOUNTER — OFFICE VISIT (OUTPATIENT)
Dept: HEMATOLOGY ONCOLOGY | Facility: HOSPITAL | Age: 68
End: 2018-08-31
Payer: MEDICARE

## 2018-08-31 VITALS
DIASTOLIC BLOOD PRESSURE: 68 MMHG | HEART RATE: 75 BPM | RESPIRATION RATE: 16 BRPM | HEIGHT: 69 IN | BODY MASS INDEX: 25.68 KG/M2 | TEMPERATURE: 97.6 F | OXYGEN SATURATION: 98 % | WEIGHT: 173.4 LBS | SYSTOLIC BLOOD PRESSURE: 112 MMHG

## 2018-08-31 DIAGNOSIS — D62 ACUTE BLOOD LOSS ANEMIA: Primary | ICD-10-CM

## 2018-08-31 PROCEDURE — 99213 OFFICE O/P EST LOW 20 MIN: CPT | Performed by: PHYSICIAN ASSISTANT

## 2018-08-31 NOTE — PROGRESS NOTES
Hematology/Oncology Outpatient Follow-up  Neida Poole 76 y o  male 1950 [id number]    Date:  8/31/2018      Assessment and Plan:  1  Acute blood loss anemia  Patient was admitted in April 2018 due to acute blood loss anemia  He states that he was given 3 units of blood during his admission  He was found to have bleeding at the hepatic flexure  This was treated with embolization by Interventional Radiology  He has not had further bleeding  He does not believe that he received any intravenous iron therapy  I did not see this in the records either  Hemoglobin in May was 12 5, 13 2 in June  He was started on oral iron in June 2018  He states he took a few times per week since June  Hemoglobin is now 15 8  He may discontinue oral iron at this time as presumably iron stores are now normal with increasing hemoglobin  He is aware to report black stools or blood in the stool to our office or his PCP  2  Leukocytosis   Patient has had borderline leukocytosis  He previously had elevated absolute basophils  These are now normal   Likely patient's elevated WBC is not of clinical significance  Absolute monocytes remains slightly elevated however they have for some time  We will see him back in 6 months with repeat CBC to re-evaluate leukocytosis  This remains stable, no further follow-up will be required  HPI:  79year old male presents for follow up for anemia  He was admitted to [facility] in April 2018  He had a recent stent in the left subclavian artery and is on aspirin 100  Has long-standing peripheral arterial disease status post multiple stenting  History of ventricular tachycardia s/p ICD implantation, CAD s/p CABG in 1999  Ischemic cardiomyopathy, paraoxysmal a fib on Xarelto      He presented to [facility] complaining of worsening bilateral lower extremity claudication for the past 2 weeks  Also admitted to bright red blood stools two times daily x 3 weeks  He had hemoglobin of 7   NM scan showed bleeding at the hepatic flexure  Bleeding in the right colon status post SMA coil embolization with IR on 4/23/18  2 additional episodes of rectal bleeding on 4/25 and hemoglobin decreased to 8 3  1 unit PRBC  He had colonoscopy on 4/26/18 which showed area in the ascending colon, suspicious of ischemic colitis possibly due to embolization done earlier  Rectal bleeding stopped       CBC on 4/28/18 showed hemoglobin 10 9       He states he saw Dr Neena Perales GI, in follow up and was then told to follow up PRN with them       He has not had any other bleeding episodes since       Smoking for 25 years  1 ppd smoking for 20 years, less in recent years and working on quitting       No further bleeding since hospitalization  Interval history: he has been taking oral iron a few times per week for the past 2 months  ROS: Review of Systems   Constitutional: Negative for activity change, appetite change (improved, has gained weight ), chills, fatigue, fever and unexpected weight change  HENT: Negative for mouth sores and nosebleeds  Respiratory: Negative for cough and shortness of breath  Cardiovascular: Negative for chest pain, palpitations and leg swelling  Gastrointestinal: Negative for abdominal pain, blood in stool, constipation, diarrhea, nausea and vomiting  Denies black stool      Genitourinary: Negative for difficulty urinating and dysuria  Musculoskeletal: Negative for arthralgias  Skin: Negative  Neurological: Negative for dizziness, weakness, light-headedness, numbness and headaches  Hematological: Negative  Past Medical History:   Diagnosis Date    CAD (coronary artery disease)     Chronic atrial fibrillation (HCC)     Coronary angioplasty status     Essential hypertension 3/20/2018    Heart disease     Hx of echocardiogram 03/11/2015    EF0 30 (31%) Mod LV systolic dysfunction  Mild concentric LVH  Trace tricuspid regurg w/o significant pulmo htn   Pacer wire traversing the RA/RV / ICE ejection; EF 0 25 (25%) severe segmental LV systolic dysfunction  Mild concentric LV hypertrophy  Mild MR  Mild pulm htn  8/25/17      Hx of myocardial perfusion scan 09/20/2016    Lexiscan MPI; EF0 24 (24%) prior basal inferior, posterobasal, basal septal myocardial infarctions  Small area of basal lateral wall ischemia by perfusion imaging   Hypertension     Ischemic cardiomyopathy 3/20/2018    Ischemic cardiomyopathy     Mixed hyperlipidemia     Neuropathy     Occlusion and stenosis of unspecified carotid artery     [facility] 3/20/2018    PVD [facility]peripheral vascular disease) ([facility] )     S/P CABG (coronary artery bypass graft) 3/20/2018    S/P implantation of automatic cardioverter/defibrillator (AICD) 3/20/2018    Tobacco abuse 03/20/2018    nicotine dependence    Ventricular tachycardia ([facility] )     Vitamin D deficiency        Past Surgical History:   Procedure Laterality Date    CARDIAC CATHETERIZATION  1999    aterial ptvzjchaprhhxnf-vflfb-2/29/1999  / EF0 35 (35%) Multivessel CAD no intervention with medical management recommended  Graft to distal LAD: occluded SVG  Graft to D1: occluded SVG  Graft to OM1:SVG 6/8/17  / Known triple vessel disease   Patent LIMA and SVG to OM 3/30/18    CARDIAC DEFIBRILLATOR PLACEMENT  2009    implantable aezensmggwtp-zjbadysbmfakl-xxafi-April 2009    CORONARY ARTERY BYPASS GRAFT  1999    LIMA-LAD, SVG-Diag, SVG-OM, SVG-Circ PL    OTHER SURGICAL HISTORY      cath stent placement       Social History     Social History    Marital status: /Civil Union     Spouse name: N/A    Number of children: N/A    Years of education: N/A     Social History Main Topics    Smoking status: Current Every Day Smoker     Packs/day: 0 50     Types: Cigarettes    Smokeless tobacco: Never Used      Comment: Pt states he is trying to cut back    Alcohol use Yes      Comment: social    Drug use: No    Sexual activity: Not Asked Other Topics Concern    None     Social History Narrative    None       Family History   Problem Relation Age of Onset    Colon polyps Brother         polyps of the sigmoid colon    Coronary artery disease Family         Less than 60 yrs of age       Allergies   Allergen Reactions    Epinephrine     Nifedipine      Procardia    Penicillins          Current Outpatient Prescriptions:     aspirin 81 mg chewable tablet, Chew 81 mg, Disp: , Rfl:     atorvastatin (LIPITOR) 40 mg tablet, Take 1 tablet by mouth daily, Disp: , Rfl:     clopidogrel (PLAVIX) 75 mg tablet, Take 75 mg by mouth daily, Disp: , Rfl:     digoxin (LANOXIN) 0 125 mg tablet, Take 125 mcg by mouth daily, Disp: , Rfl:     enalapril (VASOTEC) 5 mg tablet, Take 5 mg by mouth daily  , Disp: , Rfl:     ergocalciferol (ERGOCALCIFEROL) 13515 units capsule, Take 50,000 Units by mouth once a week, Disp: , Rfl:     furosemide (LASIX) 20 mg tablet, Take 1 tablet (20 mg total) by mouth daily, Disp: 90 tablet, Rfl: 3    isosorbide mononitrate (IMDUR) 60 mg 24 hr tablet, Take 60 mg by mouth daily In the morning, Disp: , Rfl:     metoprolol succinate (TOPROL-XL) 100 mg 24 hr tablet, Take 1 tablet (100 mg total) by mouth daily, Disp: 90 tablet, Rfl: 3    nitroglycerin (NITROSTAT) 0 4 mg SL tablet, Place 0 4 mg under the tongue At the first sign of attack; take as directed, Disp: , Rfl:     potassium chloride (K-DUR,KLOR-CON) 20 mEq tablet, Take 1 tablet (20 mEq total) by mouth daily, Disp: 30 tablet, Rfl: 0    rivaroxaban (XARELTO) 20 mg tablet, Take 1 tablet by mouth daily, Disp: , Rfl:     spironolactone (ALDACTONE) 25 mg tablet, Take 1 tablet (25 mg total) by mouth daily, Disp: 90 tablet, Rfl: 3    mexiletine (MEXITIL) 150 mg capsule, Take 150 mg by mouth, Disp: , Rfl:       Physical Exam:  /68 (BP Location: Left arm, Patient Position: Sitting, Cuff Size: Standard)   Pulse 75   Temp 97 6 °F (36 4 °C) (Tympanic)   Resp 16   Ht 5' 9" (1 753 m)   Wt 78 7 kg (173 lb 6 4 oz)   SpO2 98%   BMI 25 61 kg/m²     Physical Exam   Constitutional: He is oriented to person, place, and time  He appears well-developed and well-nourished  No distress  HENT:   Head: Normocephalic and atraumatic  Eyes: Conjunctivae are normal  No scleral icterus  Neck: Normal range of motion  Neck supple  Cardiovascular: Normal rate, regular rhythm and normal heart sounds  No murmur heard  Pulmonary/Chest: Effort normal and breath sounds normal  No respiratory distress  Pacemaker present   Abdominal: Soft  There is no tenderness  Musculoskeletal: Normal range of motion  He exhibits no edema or tenderness  Lymphadenopathy:     He has no cervical adenopathy  Neurological: He is alert and oriented to person, place, and time  No cranial nerve deficit  Skin: Skin is warm and dry  Psychiatric: He has a normal mood and affect  Vitals reviewed  Labs:  Lab Results   Component Value Date    WBC 11 31 (H) 08/30/2018    HGB 15 8 08/30/2018    HCT 49 2 08/30/2018    MCV 89 08/30/2018     08/30/2018     Lab Results   Component Value Date     04/18/2018    K 5 2 04/18/2018     (H) 04/18/2018    CO2 25 04/18/2018    ANIONGAP 8 10/07/2015    BUN 28 (H) 04/18/2018    CREATININE 1 32 (H) 04/18/2018    GLUCOSE 100 10/07/2015    GLUF 118 (H) 04/18/2018    CALCIUM 9 2 04/18/2018    AST 26 03/21/2018    ALT 30 03/21/2018    ALKPHOS 85 03/21/2018    EGFR 55 04/18/2018       Patient voiced understanding and agreement in the above discussion  Aware to contact our office with questions/symptoms in the interim

## 2018-10-01 DIAGNOSIS — E78.2 MIXED HYPERLIPIDEMIA: ICD-10-CM

## 2018-10-01 DIAGNOSIS — I48.20 CHRONIC A-FIB (HCC): Primary | ICD-10-CM

## 2018-10-01 RX ORDER — ATORVASTATIN CALCIUM 40 MG/1
40 TABLET, FILM COATED ORAL DAILY
Qty: 90 TABLET | Refills: 3 | Status: SHIPPED | OUTPATIENT
Start: 2018-10-01 | End: 2019-10-22 | Stop reason: SDUPTHER

## 2018-10-19 ENCOUNTER — IMMUNIZATION (OUTPATIENT)
Dept: INTERNAL MEDICINE CLINIC | Facility: CLINIC | Age: 68
End: 2018-10-19
Payer: MEDICARE

## 2018-10-19 DIAGNOSIS — Z23 NEED FOR PNEUMOCOCCAL VACCINATION: Primary | ICD-10-CM

## 2018-10-19 DIAGNOSIS — Z23 NEED FOR INFLUENZA VACCINATION: ICD-10-CM

## 2018-10-19 PROCEDURE — 90670 PCV13 VACCINE IM: CPT

## 2018-10-19 PROCEDURE — G0008 ADMIN INFLUENZA VIRUS VAC: HCPCS

## 2018-10-19 PROCEDURE — 90662 IIV NO PRSV INCREASED AG IM: CPT

## 2018-10-19 PROCEDURE — G0009 ADMIN PNEUMOCOCCAL VACCINE: HCPCS

## 2018-10-19 RX ORDER — MEXILETINE HYDROCHLORIDE 150 MG/1
150 CAPSULE ORAL
COMMUNITY
Start: 2018-07-09 | End: 2020-01-01 | Stop reason: HOSPADM

## 2018-11-15 ENCOUNTER — OFFICE VISIT (OUTPATIENT)
Dept: CARDIOLOGY CLINIC | Facility: CLINIC | Age: 68
End: 2018-11-15
Payer: MEDICARE

## 2018-11-15 VITALS
DIASTOLIC BLOOD PRESSURE: 72 MMHG | HEART RATE: 70 BPM | WEIGHT: 180 LBS | SYSTOLIC BLOOD PRESSURE: 118 MMHG | BODY MASS INDEX: 26.66 KG/M2 | HEIGHT: 69 IN

## 2018-11-15 DIAGNOSIS — I25.5 ISCHEMIC CARDIOMYOPATHY: Primary | Chronic | ICD-10-CM

## 2018-11-15 DIAGNOSIS — I10 ESSENTIAL HYPERTENSION: Chronic | ICD-10-CM

## 2018-11-15 DIAGNOSIS — Z95.810 ICD (IMPLANTABLE CARDIOVERTER-DEFIBRILLATOR), SINGLE, IN SITU: Chronic | ICD-10-CM

## 2018-11-15 DIAGNOSIS — E78.5 DYSLIPIDEMIA: ICD-10-CM

## 2018-11-15 DIAGNOSIS — Z72.0 TOBACCO ABUSE: Chronic | ICD-10-CM

## 2018-11-15 DIAGNOSIS — I50.22 CHRONIC SYSTOLIC CONGESTIVE HEART FAILURE (HCC): ICD-10-CM

## 2018-11-15 DIAGNOSIS — Z95.1 S/P CABG (CORONARY ARTERY BYPASS GRAFT): Chronic | ICD-10-CM

## 2018-11-15 DIAGNOSIS — I48.0 PAF (PAROXYSMAL ATRIAL FIBRILLATION) (HCC): ICD-10-CM

## 2018-11-15 DIAGNOSIS — I47.2 VT (VENTRICULAR TACHYCARDIA) (HCC): ICD-10-CM

## 2018-11-15 PROCEDURE — 99214 OFFICE O/P EST MOD 30 MIN: CPT | Performed by: INTERNAL MEDICINE

## 2018-11-15 RX ORDER — METOPROLOL SUCCINATE 25 MG/1
25 TABLET, EXTENDED RELEASE ORAL DAILY
COMMUNITY
Start: 2018-10-26 | End: 2019-06-20 | Stop reason: SDUPTHER

## 2018-11-15 NOTE — PATIENT INSTRUCTIONS
Coronary Artery Disease   AMBULATORY CARE:   Coronary artery disease (CAD)  is narrowing of the arteries to your heart caused by a buildup of plaque  Plaque is made up of cholesterol and other substances  The narrowing in your arteries decreases the amount of blood that can flow to your heart  This causes your heart to get less oxygen  You may not have any symptoms of CAD  It is important for you to manage CAD even if you feel well  CAD can lead to a heart attack if it is not managed  Common symptoms include the following:   · Chest pain (angina), causing burning, squeezing, or crushing tightness in your chest    · Pain that spreads to your neck, jaw, or shoulder blade    · Nausea, vomiting, sweating, fainting, and hands and feet that are cold to the touch  Call 911 for any of the following:   · You have any of the following signs of a heart attack:      ¨ Squeezing, pressure, or pain in your chest that lasts longer than 5 minutes or returns    ¨ Discomfort or pain in your back, neck, jaw, stomach, or arm     ¨ Trouble breathing    ¨ Nausea or vomiting    ¨ Lightheadedness or a sudden cold sweat, especially with chest pain or trouble breathing    Contact your healthcare provider if:   · You have chest pain that is more frequent, or you have chest pain at rest     · You have questions or concerns about your condition or care  Medicines used to treat CAD:   · Blood pressure medicines  are given to lower your blood pressure  ACE inhibitors help keep your blood vessels relaxed and open to help keep blood flowing into your heart  Beta-blockers keep your heart pumping strongly and regularly so it does not work too hard to get oxygen  · Cholesterol medicines  help lower blood cholesterol levels  · Nitrates , such as nitroglycerin, relax the arteries of your heart so it gets more oxygen  They help to relieve your chest pain  · Antiplatelets , such as aspirin, help prevent blood clots   Take your antiplatelet medicine exactly as directed  These medicines make it more likely for you to bleed or bruise  If you are told to take aspirin, do not take acetaminophen or ibuprofen instead  · Blood thinners  keep clots from forming in your blood  Clots may cause heart attacks, strokes, or death  This medicine makes it more likely for you to bleed or bruise  · Do not take certain medicines without asking your healthcare provider first   These include NSAIDs, herbal or vitamin supplements, or hormones (estrogen or progestin)  Procedures used to treat CAD:   · Angioplasty  may be done to open an artery blocked by plaque  A tube with a balloon on the end is threaded into the blocked artery  Once the tube is in the artery, the balloon is inflated  As the balloon inflates, it presses the plaque against the artery wall to open the artery  A stent may be placed in your artery to keep it open  · Coronary artery bypass graft surgery (CABG)  is open heart surgery  Healthcare providers take arteries or veins from other areas in your body and use them to bypass or go around the blocked arteries of your heart  Cardiac rehabilitation:  Your healthcare provider may recommend that you attend cardiac rehabilitation (rehab)  This is a program run by specialists who will help you safely strengthen your heart and reduce the risk for more heart disease  The plan includes exercise, relaxation, stress management, and heart-healthy nutrition  Healthcare providers will also check to make sure any medicines you are taking are working  Manage CAD to prevent a heart attack:   · Do not smoke  Nicotine and other chemicals in cigarettes and cigars can cause heart and lung damage  Ask your healthcare provider for information if you currently smoke and need help to quit  E-cigarettes or smokeless tobacco still contain nicotine  Talk to your healthcare provider before you use these products  · Exercise regularly    Exercise at least 30 minutes each day, on most days of the week  Exercise helps to lower high cholesterol and high blood pressure  It can also help you maintain a healthy weight  Ask your healthcare provider about the kind of exercise you should do and how to get started  · Maintain a healthy weight  If you are overweight, talk to your healthcare provider about how to lose weight  A weight loss of 10% can improve your heart health  · Eat heart-healthy foods  Include fresh fruits and vegetables in your meal plan  Choose low-fat foods, such as skim or 1% fat milk, low-fat cheese and yogurt, fish, chicken (without skin), and lean meats  Eat two 4-ounce servings of fish high in omega-3 fats each week, such as salmon, fresh tuna, and herring  Do not eat foods that are high in sodium, such as canned foods, potato chips, salty snacks, and cold cuts  Put less table salt on your food  · Limit or do not drink alcohol  A drink of alcohol is 12 ounces of beer, 5 ounces of wine, or 1½ ounces of liquor  · Manage other health conditions  Follow your healthcare provider's advice on how to manage other conditions that can affect your heart health  These include diabetes, high blood pressure, and high cholesterol  You may need to take medicines for these conditions and make other lifestyle changes  · Ask if you should have a flu vaccine  The flu can be dangerous for a person who has CAD  The flu vaccine is available every year in the fall  Follow up with your healthcare provider as directed: You may need to return for other tests  You may also be referred to a cardiac surgeon  Write down your questions so you remember to ask them during your visits  © 2017 [address] [employer]  Information is for End User's use only and may not be sold, redistributed or otherwise used for commercial purposes  All illustrations and images included in CareNotes® are the copyrighted property of [employer]  or [employer]    The above information is an  only  It is not intended as medical advice for individual conditions or treatments  Talk to your doctor, nurse or pharmacist before following any medical regimen to see if it is safe and effective for you

## 2018-11-15 NOTE — PROGRESS NOTES
Subjective:        Patient ID: Leonel Olivas is a 76 y o  male  Chief Complaint:  Carlie Gonzales is here for routine cardiac follow-up  EPS no reviewed  GI bleed admission reviewed  Watchman device recommended if recurrent GI bleeding  Carlie Gonzales denies any bleeding issues, having had no melena or hematochezia, last hemoglobin normal   He is on baby aspirin therapy and Xarelto therapy  No longer taking Plavix/clopidogrel  Denies any chest pains, he has not needed to use any nitroglycerines  Has not had any concerning shortness of breath nor edema denies any orthopnea  Denies any palpitations, shocks, presyncope or syncope  No unilateral symptoms  No new medications  The following portions of the patient's history were reviewed and updated as appropriate: allergies, current medications, past family history, past medical history, past social history, past surgical history and problem list   Review of Systems   Constitution: Negative for chills, diaphoresis, malaise/fatigue and weight gain  HENT: Negative for nosebleeds and stridor  Eyes: Negative for double vision, vision loss in left eye, vision loss in right eye and visual disturbance  Cardiovascular: Negative for chest pain, claudication, cyanosis, dyspnea on exertion, irregular heartbeat, leg swelling, near-syncope, orthopnea, palpitations, paroxysmal nocturnal dyspnea and syncope  Respiratory: Negative for cough, shortness of breath, snoring and wheezing  Endocrine: Negative for polydipsia, polyphagia and polyuria  Hematologic/Lymphatic: Negative for bleeding problem  Does not bruise/bleed easily  Skin: Negative for flushing and rash  Musculoskeletal: Negative for falls and myalgias  Gastrointestinal: Negative for abdominal pain, heartburn, hematemesis, hematochezia, melena and nausea  Genitourinary: Negative for hematuria     Neurological: Negative for brief paralysis, dizziness, focal weakness, headaches, light-headedness, loss of balance and vertigo  Psychiatric/Behavioral: Negative for altered mental status and substance abuse  Allergic/Immunologic: Negative for hives  Objective:      /72   Pulse 70   Ht 5' 9" (1 753 m)   Wt 81 6 kg (180 lb)   BMI 26 58 kg/m²   Physical Exam   Constitutional: He is oriented to person, place, and time  He appears well-developed and well-nourished  No distress  HENT:   Head: Normocephalic and atraumatic  Eyes: Pupils are equal, round, and reactive to light  EOM are normal  No scleral icterus  Neck: Normal range of motion  Neck supple  No JVD present  No thyromegaly present  Cardiovascular: Normal rate, regular rhythm and normal heart sounds  Exam reveals no gallop and no friction rub  No murmur heard  Pulmonary/Chest: Effort normal and breath sounds normal  No stridor  No respiratory distress  He has no wheezes  He has no rales  Abdominal: Soft  Bowel sounds are normal  He exhibits no distension and no mass  There is no tenderness  Musculoskeletal: Normal range of motion  He exhibits no edema or deformity  Neurological: He is alert and oriented to person, place, and time  Coordination normal    Skin: Skin is warm and dry  No erythema  No pallor  Psychiatric: He has a normal mood and affect  His behavior is normal        Lab Review:   No visits with results within 2 Month(s) from this visit     Latest known visit with results is:   Admission on 08/29/2018, Discharged on 08/30/2018   Component Date Value    WBC 08/30/2018 11 31*    RBC 08/30/2018 5 54     Hemoglobin 08/30/2018 15 8     Hematocrit 08/30/2018 49 2     MCV 08/30/2018 89     MCH 08/30/2018 28 5     MCHC 08/30/2018 32 1     RDW 08/30/2018 20 3*    MPV 08/30/2018 8 7*    Platelets 82/46/8773 259     nRBC 08/30/2018 0     Neutrophils Relative 08/30/2018 45     Immat GRANS % 08/30/2018 1     Lymphocytes Relative 08/30/2018 35     Monocytes Relative 08/30/2018 13*    Eosinophils Relative 08/30/2018 5     Basophils Relative 08/30/2018 1     Neutrophils Absolute 08/30/2018 5 18     Immature Grans Absolute 08/30/2018 0 06     Lymphocytes Absolute 08/30/2018 3 91     Monocytes Absolute 08/30/2018 1 50*    Eosinophils Absolute 08/30/2018 0 59     Basophils Absolute 08/30/2018 0 07     Protime 08/30/2018 16 3*    INR 08/30/2018 1 38*    PTT 08/30/2018 34      No results found  Assessment:       1  Ischemic cardiomyopathy     2  Essential hypertension     3  [facility] ([facility] )     4  Chronic systolic congestive heart failure ([facility] )     5  Tobacco abuse     6  S/P CABG (coronary artery bypass graft)     7  ICD (implantable cardioverter-defibrillator), single, in situ     8  Dyslipidemia     9  PAF (paroxysmal atrial fibrillation) ([facility] )          Plan:       Carlie Gonzales is without any angina, he examines euvolemic without any signs or symptoms of decompensated heart failure, is no evidence for recurrent VT  He has sublingual nitroglycerin if needed  Clinically he examines in sinus rhythm  Smoking cessation encouraged  Medications reviewed, no changes advised today  He has not had any signs of recurrent bleeding  We discussed the Watchman device in detail should recurrent bleeding ensue, this would be advised  Return office in 4 months, sooner as needed  Continue EPS follow-up

## 2019-01-01 ENCOUNTER — OFFICE VISIT (OUTPATIENT)
Dept: INTERNAL MEDICINE CLINIC | Facility: CLINIC | Age: 69
End: 2019-01-01
Payer: MEDICARE

## 2019-01-01 ENCOUNTER — APPOINTMENT (OUTPATIENT)
Dept: LAB | Facility: MEDICAL CENTER | Age: 69
End: 2019-01-01
Payer: MEDICARE

## 2019-01-01 VITALS
BODY MASS INDEX: 26.38 KG/M2 | WEIGHT: 184.25 LBS | OXYGEN SATURATION: 99 % | TEMPERATURE: 96.2 F | SYSTOLIC BLOOD PRESSURE: 118 MMHG | HEART RATE: 76 BPM | DIASTOLIC BLOOD PRESSURE: 70 MMHG | HEIGHT: 70 IN

## 2019-01-01 DIAGNOSIS — I25.9 CHRONIC ISCHEMIC HEART DISEASE: ICD-10-CM

## 2019-01-01 DIAGNOSIS — I25.5 ISCHEMIC CARDIOMYOPATHY: Chronic | ICD-10-CM

## 2019-01-01 DIAGNOSIS — Z12.5 PROSTATE CANCER SCREENING: ICD-10-CM

## 2019-01-01 DIAGNOSIS — I20.9 ANGINA PECTORIS (HCC): ICD-10-CM

## 2019-01-01 DIAGNOSIS — Z00.00 MEDICARE ANNUAL WELLNESS VISIT, SUBSEQUENT: Primary | ICD-10-CM

## 2019-01-01 DIAGNOSIS — I49.01 VENTRICULAR FIBRILLATION (HCC): ICD-10-CM

## 2019-01-01 DIAGNOSIS — Z12.5 PROSTATE CANCER SCREENING: Primary | ICD-10-CM

## 2019-01-01 DIAGNOSIS — Z23 IMMUNIZATION DUE: ICD-10-CM

## 2019-01-01 LAB
ALBUMIN SERPL BCP-MCNC: 4.3 G/DL (ref 3.5–5)
ALP SERPL-CCNC: 85 U/L (ref 46–116)
ALT SERPL W P-5'-P-CCNC: 29 U/L (ref 12–78)
ANION GAP SERPL CALCULATED.3IONS-SCNC: 5 MMOL/L (ref 4–13)
AST SERPL W P-5'-P-CCNC: 23 U/L (ref 5–45)
BASOPHILS # BLD AUTO: 0.12 THOUSANDS/ΜL (ref 0–0.1)
BASOPHILS NFR BLD AUTO: 1 % (ref 0–1)
BILIRUB SERPL-MCNC: 0.79 MG/DL (ref 0.2–1)
BUN SERPL-MCNC: 21 MG/DL (ref 5–25)
CALCIUM SERPL-MCNC: 9.7 MG/DL (ref 8.3–10.1)
CHLORIDE SERPL-SCNC: 108 MMOL/L (ref 100–108)
CHOLEST SERPL-MCNC: 170 MG/DL (ref 50–200)
CO2 SERPL-SCNC: 26 MMOL/L (ref 21–32)
CREAT SERPL-MCNC: 1.26 MG/DL (ref 0.6–1.3)
EOSINOPHIL # BLD AUTO: 0.47 THOUSAND/ΜL (ref 0–0.61)
EOSINOPHIL NFR BLD AUTO: 4 % (ref 0–6)
ERYTHROCYTE [DISTWIDTH] IN BLOOD BY AUTOMATED COUNT: 14.6 % (ref 11.6–15.1)
GFR SERPL CREATININE-BSD FRML MDRD: 58 ML/MIN/1.73SQ M
GLUCOSE P FAST SERPL-MCNC: 103 MG/DL (ref 65–99)
HCT VFR BLD AUTO: 46.8 % (ref 36.5–49.3)
HDLC SERPL-MCNC: 40 MG/DL
HGB BLD-MCNC: 15 G/DL (ref 12–17)
IMM GRANULOCYTES # BLD AUTO: 0.03 THOUSAND/UL (ref 0–0.2)
IMM GRANULOCYTES NFR BLD AUTO: 0 % (ref 0–2)
LDLC SERPL CALC-MCNC: 111 MG/DL (ref 0–100)
LYMPHOCYTES # BLD AUTO: 2.89 THOUSANDS/ΜL (ref 0.6–4.47)
LYMPHOCYTES NFR BLD AUTO: 27 % (ref 14–44)
MCH RBC QN AUTO: 32.3 PG (ref 26.8–34.3)
MCHC RBC AUTO-ENTMCNC: 32.1 G/DL (ref 31.4–37.4)
MCV RBC AUTO: 101 FL (ref 82–98)
MONOCYTES # BLD AUTO: 1.2 THOUSAND/ΜL (ref 0.17–1.22)
MONOCYTES NFR BLD AUTO: 11 % (ref 4–12)
NEUTROPHILS # BLD AUTO: 5.96 THOUSANDS/ΜL (ref 1.85–7.62)
NEUTS SEG NFR BLD AUTO: 57 % (ref 43–75)
NONHDLC SERPL-MCNC: 130 MG/DL
NRBC BLD AUTO-RTO: 0 /100 WBCS
PLATELET # BLD AUTO: 297 THOUSANDS/UL (ref 149–390)
PMV BLD AUTO: 9.3 FL (ref 8.9–12.7)
POTASSIUM SERPL-SCNC: 4.4 MMOL/L (ref 3.5–5.3)
PROT SERPL-MCNC: 7.1 G/DL (ref 6.4–8.2)
PSA SERPL-MCNC: 0.9 NG/ML (ref 0–4)
RBC # BLD AUTO: 4.64 MILLION/UL (ref 3.88–5.62)
SODIUM SERPL-SCNC: 139 MMOL/L (ref 136–145)
TRIGL SERPL-MCNC: 93 MG/DL
WBC # BLD AUTO: 10.67 THOUSAND/UL (ref 4.31–10.16)

## 2019-01-01 PROCEDURE — G0103 PSA SCREENING: HCPCS

## 2019-01-01 PROCEDURE — G0009 ADMIN PNEUMOCOCCAL VACCINE: HCPCS | Performed by: INTERNAL MEDICINE

## 2019-01-01 PROCEDURE — 85025 COMPLETE CBC W/AUTO DIFF WBC: CPT

## 2019-01-01 PROCEDURE — G0439 PPPS, SUBSEQ VISIT: HCPCS | Performed by: INTERNAL MEDICINE

## 2019-01-01 PROCEDURE — 80053 COMPREHEN METABOLIC PANEL: CPT | Performed by: INTERNAL MEDICINE

## 2019-01-01 PROCEDURE — 80061 LIPID PANEL: CPT | Performed by: INTERNAL MEDICINE

## 2019-01-01 PROCEDURE — 90732 PPSV23 VACC 2 YRS+ SUBQ/IM: CPT | Performed by: INTERNAL MEDICINE

## 2019-01-01 PROCEDURE — 36415 COLL VENOUS BLD VENIPUNCTURE: CPT | Performed by: INTERNAL MEDICINE

## 2019-01-01 RX ORDER — NITROGLYCERIN 0.4 MG/1
TABLET SUBLINGUAL
Qty: 25 TABLET | Refills: 2 | Status: SHIPPED | OUTPATIENT
Start: 2019-01-01 | End: 2020-01-01 | Stop reason: SDUPTHER

## 2019-02-22 ENCOUNTER — APPOINTMENT (OUTPATIENT)
Dept: LAB | Facility: MEDICAL CENTER | Age: 69
End: 2019-02-22
Payer: MEDICARE

## 2019-02-22 LAB
BASOPHILS # BLD AUTO: 0.14 THOUSANDS/ΜL (ref 0–0.1)
BASOPHILS NFR BLD AUTO: 2 % (ref 0–1)
EOSINOPHIL # BLD AUTO: 0.49 THOUSAND/ΜL (ref 0–0.61)
EOSINOPHIL NFR BLD AUTO: 5 % (ref 0–6)
ERYTHROCYTE [DISTWIDTH] IN BLOOD BY AUTOMATED COUNT: 14.3 % (ref 11.6–15.1)
HCT VFR BLD AUTO: 49.2 % (ref 36.5–49.3)
HGB BLD-MCNC: 16 G/DL (ref 12–17)
IMM GRANULOCYTES # BLD AUTO: 0.04 THOUSAND/UL (ref 0–0.2)
IMM GRANULOCYTES NFR BLD AUTO: 0 % (ref 0–2)
LYMPHOCYTES # BLD AUTO: 2.5 THOUSANDS/ΜL (ref 0.6–4.47)
LYMPHOCYTES NFR BLD AUTO: 26 % (ref 14–44)
MCH RBC QN AUTO: 32 PG (ref 26.8–34.3)
MCHC RBC AUTO-ENTMCNC: 32.5 G/DL (ref 31.4–37.4)
MCV RBC AUTO: 98 FL (ref 82–98)
MONOCYTES # BLD AUTO: 1 THOUSAND/ΜL (ref 0.17–1.22)
MONOCYTES NFR BLD AUTO: 11 % (ref 4–12)
NEUTROPHILS # BLD AUTO: 5.29 THOUSANDS/ΜL (ref 1.85–7.62)
NEUTS SEG NFR BLD AUTO: 56 % (ref 43–75)
NRBC BLD AUTO-RTO: 0 /100 WBCS
PLATELET # BLD AUTO: 328 THOUSANDS/UL (ref 149–390)
PMV BLD AUTO: 9.5 FL (ref 8.9–12.7)
RBC # BLD AUTO: 5 MILLION/UL (ref 3.88–5.62)
WBC # BLD AUTO: 9.46 THOUSAND/UL (ref 4.31–10.16)

## 2019-02-22 PROCEDURE — 36415 COLL VENOUS BLD VENIPUNCTURE: CPT | Performed by: PHYSICIAN ASSISTANT

## 2019-02-22 PROCEDURE — 85025 COMPLETE CBC W/AUTO DIFF WBC: CPT | Performed by: PHYSICIAN ASSISTANT

## 2019-03-01 ENCOUNTER — OFFICE VISIT (OUTPATIENT)
Dept: HEMATOLOGY ONCOLOGY | Facility: HOSPITAL | Age: 69
End: 2019-03-01
Payer: MEDICARE

## 2019-03-01 VITALS
TEMPERATURE: 95 F | HEIGHT: 70 IN | BODY MASS INDEX: 26.48 KG/M2 | RESPIRATION RATE: 18 BRPM | DIASTOLIC BLOOD PRESSURE: 80 MMHG | WEIGHT: 185 LBS | HEART RATE: 79 BPM | OXYGEN SATURATION: 99 % | SYSTOLIC BLOOD PRESSURE: 120 MMHG

## 2019-03-01 DIAGNOSIS — Z86.2 HISTORY OF IRON DEFICIENCY ANEMIA: Primary | ICD-10-CM

## 2019-03-01 DIAGNOSIS — Z86.2 HISTORY OF LEUKOCYTOSIS: ICD-10-CM

## 2019-03-01 PROBLEM — D72.829 LEUKOCYTOSIS: Status: RESOLVED | Noted: 2018-06-22 | Resolved: 2019-03-01

## 2019-03-01 PROCEDURE — 99213 OFFICE O/P EST LOW 20 MIN: CPT | Performed by: PHYSICIAN ASSISTANT

## 2019-03-01 NOTE — PROGRESS NOTES
Hematology/Oncology Outpatient Follow-up  Fartun Francis 76 y o  male 1950 [id number]    Date:  3/1/2019      Assessment and Plan:  1  History of iron deficiency anemia  59-year-old male presents for follow-up  Patient was admitted in April 2018 to [facility] due to acute blood loss anemia  [facility] states that he was given 3 units of blood during his admission  [facility] was found to have bleeding at the hepatic flexure   This was treated with embolization by Interventional Radiology  He took oral iron intermittently for 2 months and then discontinued in August 2018  Hemoglobin and MCV remained normal       Patient is aware of signs and symptoms of GI bleeding  From hematology perspective patient's CBC remains normal   Therefore, he does not need further follow-up with us  Follow-up as needed  2  History of leukocytosis  History of leukocytosis  This now has resolved  HPI:  79year old male presents for follow up for anemia  He was admitted to [facility] in April 2018  He had a recent stent in the left subclavian artery and is on aspirin 100   Has long-standing peripheral arterial disease status post multiple stenting   History of ventricular tachycardia s/p ICD implantation, CAD s/p CABG in 1999  Ischemic cardiomyopathy, paraoxysmal a fib on Xarelto      He presented to [facility] complaining of worsening bilateral lower extremity claudication for the past 2 weeks  Also admitted to bright red blood stools two times daily x 3 weeks  He had hemoglobin of 7  NM scan showed bleeding at the hepatic flexure  Bleeding in the right colon status post SMA coil embolization with IR on 4/23/18  2 additional episodes of rectal bleeding on 4/25 and hemoglobin decreased to 8 3  1 unit PRBC  He had colonoscopy on 4/26/18 which showed area in the ascending colon, suspicious of ischemic colitis possibly due to embolization done earlier   Rectal bleeding stopped       CBC on 4/28/18 showed hemoglobin 10 9       He states he saw Dr Dempsey, GI, in follow up and was then told to follow up PRN with them       He has not had any other bleeding episodes since       Smoking for 25 years  1 ppd smoking for 20 years, less in recent years and working on quitting       No further bleeding since hospitalization    Interval history: Patient continues to do well  No bleeding symptoms  ROS: Review of Systems   Constitutional: Negative for appetite change, chills, fatigue, fever and unexpected weight change  HENT: Negative for mouth sores and nosebleeds  Respiratory: Negative for cough and shortness of breath  Cardiovascular: Negative for chest pain, palpitations and leg swelling  Gastrointestinal: Negative for abdominal pain, blood in stool, constipation, diarrhea, nausea and vomiting  Denies black stools   Genitourinary: Negative for difficulty urinating, dysuria and hematuria  Musculoskeletal: Negative for arthralgias, joint swelling and myalgias  Skin: Negative  Neurological: Negative for dizziness, weakness, light-headedness, numbness and headaches  Hematological: Negative  Psychiatric/Behavioral: Negative  Past Medical History:   Diagnosis Date    CAD (coronary artery disease)     Chronic atrial fibrillation (HCC)     Coronary angioplasty status     Essential hypertension 3/20/2018    Heart disease     Hx of echocardiogram 03/11/2015    EF0 30 (22%) Mod LV systolic dysfunction  Mild concentric LVH  Trace tricuspid regurg w/o significant pulmo htn  Pacer wire traversing the RA/RV / ICE ejection; EF 0 25 (25%) severe segmental LV systolic dysfunction  Mild concentric LV hypertrophy  Mild MR  Mild pulm htn  8/25/17      Hx of myocardial perfusion scan 09/20/2016    Lexiscan MPI; EF0 24 (24%) prior basal inferior, posterobasal, basal septal myocardial infarctions  Small area of basal lateral wall ischemia by perfusion imaging      Hypertension     Ischemic cardiomyopathy 3/20/2018    Ischemic cardiomyopathy     Mixed hyperlipidemia     Neuropathy     Occlusion and stenosis of unspecified carotid artery     Peripheral arterial disease ([facility] ) 3/20/2018    PVD [facility]peripheral vascular disease) ([facility] )     S/P CABG (coronary artery bypass graft) 3/20/2018    S/P implantation of automatic cardioverter/defibrillator (AICD) 3/20/2018    Tobacco abuse 03/20/2018    nicotine dependence    Ventricular tachycardia ([facility] )     Vitamin D deficiency        Past Surgical History:   Procedure Laterality Date    CARDIAC CATHETERIZATION  1999    aterial qrqohbpodayjwon-hmqth-3/29/1999  / EF0 35 (35%) Multivessel CAD no intervention with medical management recommended  Graft to distal LAD: occluded SVG  Graft to D1: occluded SVG  Graft to OM1:SVG 6/8/17  / Known triple vessel disease   Patent LIMA and SVG to OM 3/30/18    CARDIAC DEFIBRILLATOR PLACEMENT  2009    implantable goyksaouetut-buynzltualyzs-amdne-April 2009    CORONARY ARTERY BYPASS GRAFT  1999    LIMA-LAD, SVG-Diag, SVG-OM, SVG-Circ PL    OTHER SURGICAL HISTORY      cath stent placement       Social History     Socioeconomic History    Marital status: /Civil Union     Spouse name: Not on file    Number of children: Not on file    Years of education: Not on file    Highest education level: Not on file   Occupational History    Not on file   Social Needs    Financial resource strain: Not on file    Food insecurity:     Worry: Not on file     Inability: Not on file    Transportation needs:     Medical: Not on file     Non-medical: Not on file   Tobacco Use    Smoking status: Current Every Day Smoker     Packs/day: 0 50     Types: Cigarettes    Smokeless tobacco: Never Used    Tobacco comment: Pt states he is trying to cut back   Substance and Sexual Activity    Alcohol use: Yes     Comment: social    Drug use: No    Sexual activity: Not on file   Lifestyle    Physical activity:     Days per week: Not on file     Minutes per session: Not on file    Stress: Not on file   Relationships    Social connections:     Talks on phone: Not on file     Gets together: Not on file     Attends Jewish service: Not on file     Active member of club or organization: Not on file     Attends meetings of clubs or organizations: Not on file     Relationship status: Not on file    Intimate partner violence:     Fear of current or ex partner: Not on file     Emotionally abused: Not on file     Physically abused: Not on file     Forced sexual activity: Not on file   Other Topics Concern    Not on file   Social History Narrative    Not on file       Family History   Problem Relation Age of Onset    Colon polyps Brother         polyps of the sigmoid colon    Coronary artery disease Family         Less than 60 yrs of age       Allergies   Allergen Reactions    Epinephrine     Nifedipine      Procardia    Penicillins          Current Outpatient Medications:     aspirin 81 mg chewable tablet, Chew 81 mg, Disp: , Rfl:     atorvastatin (LIPITOR) 40 mg tablet, Take 1 tablet (40 mg total) by mouth daily, Disp: 90 tablet, Rfl: 3    enalapril (VASOTEC) 5 mg tablet, Take 5 mg by mouth daily  , Disp: , Rfl:     furosemide (LASIX) 20 mg tablet, Take 1 tablet (20 mg total) by mouth daily, Disp: 90 tablet, Rfl: 3    metoprolol succinate (TOPROL-XL) 100 mg 24 hr tablet, Take 1 tablet (100 mg total) by mouth daily, Disp: 90 tablet, Rfl: 3    metoprolol succinate (TOPROL-XL) 25 mg 24 hr tablet, Take 25 mg by mouth daily, Disp: , Rfl:     mexiletine (MEXITIL) 150 mg capsule, Take 150 mg by mouth, Disp: , Rfl:     nitroglycerin (NITROSTAT) 0 4 mg SL tablet, Place 0 4 mg under the tongue At the first sign of attack; take as directed, Disp: , Rfl:     rivaroxaban (XARELTO) 20 mg tablet, Take 1 tablet (20 mg total) by mouth daily, Disp: 90 tablet, Rfl: 3    spironolactone (ALDACTONE) 25 mg tablet, Take 1 tablet (25 mg total) by mouth daily, Disp: 90 tablet, Rfl: 3    digoxin (LANOXIN) 0 125 mg tablet, Take 125 mcg by mouth daily, Disp: , Rfl:     ergocalciferol (ERGOCALCIFEROL) 83720 units capsule, Take 50,000 Units by mouth once a week, Disp: , Rfl:     isosorbide mononitrate (IMDUR) 60 mg 24 hr tablet, Take 60 mg by mouth daily In the morning, Disp: , Rfl:     mexiletine (MEXITIL) 150 mg capsule, Take 150 mg by mouth, Disp: , Rfl:     potassium chloride (K-DUR,KLOR-CON) 20 mEq tablet, Take 1 tablet (20 mEq total) by mouth daily (Patient not taking: Reported on 11/15/2018 ), Disp: 30 tablet, Rfl: 0      Physical Exam:  /80   Pulse 79   Temp (!) 95 °F (35 °C) (Tympanic)   Resp 18   Ht 5' 10" (1 778 m)   Wt 83 9 kg (185 lb)   SpO2 99%   BMI 26 54 kg/m²     Physical Exam   Constitutional: He is oriented to person, place, and time  He appears well-developed and well-nourished  No distress  HENT:   Head: Normocephalic and atraumatic  Eyes: Conjunctivae are normal  No scleral icterus  Neck: Normal range of motion  Neck supple  Cardiovascular: Normal rate and normal heart sounds  An irregularly irregular rhythm present  No murmur heard  Pulmonary/Chest: Effort normal and breath sounds normal  No respiratory distress  Abdominal: Soft  There is no tenderness  Musculoskeletal: Normal range of motion  He exhibits no edema or tenderness  Neurological: He is alert and oriented to person, place, and time  No cranial nerve deficit  Skin: Skin is warm and dry  Psychiatric: He has a normal mood and affect  Vitals reviewed        Labs:  Lab Results   Component Value Date    WBC 9 46 02/22/2019    HGB 16 0 02/22/2019    HCT 49 2 02/22/2019    MCV 98 02/22/2019     02/22/2019     Lab Results   Component Value Date     10/07/2015    K 5 2 04/18/2018     (H) 04/18/2018    CO2 25 04/18/2018    ANIONGAP 8 10/07/2015    BUN 28 (H) 04/18/2018    CREATININE 1 32 (H) 04/18/2018    GLUCOSE 100 10/07/2015    GLUF 118 (H) 04/18/2018    CALCIUM 9 2 04/18/2018    AST 26 03/21/2018    ALT 30 03/21/2018    ALKPHOS 85 03/21/2018    EGFR 55 04/18/2018       Patient voiced understanding and agreement in the above discussion  Aware to contact our office with questions/symptoms in the interim

## 2019-03-25 DIAGNOSIS — I20.9 ANGINA PECTORIS (HCC): Primary | ICD-10-CM

## 2019-03-25 RX ORDER — NITROGLYCERIN 0.4 MG/1
0.4 TABLET SUBLINGUAL
Qty: 25 TABLET | Refills: 5 | Status: SHIPPED | OUTPATIENT
Start: 2019-03-25 | End: 2019-01-01 | Stop reason: SDUPTHER

## 2019-04-01 ENCOUNTER — OFFICE VISIT (OUTPATIENT)
Dept: CARDIOLOGY CLINIC | Facility: CLINIC | Age: 69
End: 2019-04-01
Payer: MEDICARE

## 2019-04-01 VITALS
SYSTOLIC BLOOD PRESSURE: 130 MMHG | DIASTOLIC BLOOD PRESSURE: 84 MMHG | HEART RATE: 80 BPM | HEIGHT: 70 IN | BODY MASS INDEX: 26.48 KG/M2 | WEIGHT: 185 LBS

## 2019-04-01 DIAGNOSIS — Z95.1 S/P CABG (CORONARY ARTERY BYPASS GRAFT): Chronic | ICD-10-CM

## 2019-04-01 DIAGNOSIS — I10 ESSENTIAL HYPERTENSION: Chronic | ICD-10-CM

## 2019-04-01 DIAGNOSIS — Z95.810 ICD (IMPLANTABLE CARDIOVERTER-DEFIBRILLATOR), SINGLE, IN SITU: Chronic | ICD-10-CM

## 2019-04-01 DIAGNOSIS — I10 HYPERTENSION, UNSPECIFIED TYPE: ICD-10-CM

## 2019-04-01 DIAGNOSIS — I25.810 CORONARY ARTERY DISEASE INVOLVING CORONARY BYPASS GRAFT OF NATIVE HEART WITHOUT ANGINA PECTORIS: ICD-10-CM

## 2019-04-01 DIAGNOSIS — I25.5 ISCHEMIC CARDIOMYOPATHY: Chronic | ICD-10-CM

## 2019-04-01 DIAGNOSIS — I48.0 PAF (PAROXYSMAL ATRIAL FIBRILLATION) (HCC): ICD-10-CM

## 2019-04-01 DIAGNOSIS — I48.91 ATRIAL FIBRILLATION, UNSPECIFIED TYPE (HCC): ICD-10-CM

## 2019-04-01 DIAGNOSIS — I20.9 ANGINA PECTORIS (HCC): Primary | ICD-10-CM

## 2019-04-01 PROCEDURE — 99214 OFFICE O/P EST MOD 30 MIN: CPT | Performed by: INTERNAL MEDICINE

## 2019-04-01 PROCEDURE — 93000 ELECTROCARDIOGRAM COMPLETE: CPT | Performed by: INTERNAL MEDICINE

## 2019-04-01 RX ORDER — METOPROLOL SUCCINATE 100 MG/1
TABLET, EXTENDED RELEASE ORAL
Qty: 90 TABLET | Refills: 3 | Status: SHIPPED | OUTPATIENT
Start: 2019-04-01 | End: 2019-06-20 | Stop reason: SDUPTHER

## 2019-04-01 RX ORDER — ISOSORBIDE MONONITRATE 30 MG/1
30 TABLET, EXTENDED RELEASE ORAL DAILY
Qty: 30 TABLET | Refills: 5 | Status: SHIPPED | OUTPATIENT
Start: 2019-04-01 | End: 2019-04-11 | Stop reason: SDUPTHER

## 2019-04-02 ENCOUNTER — APPOINTMENT (OUTPATIENT)
Dept: LAB | Facility: MEDICAL CENTER | Age: 69
End: 2019-04-02
Payer: MEDICARE

## 2019-04-02 DIAGNOSIS — I25.5 ISCHEMIC CARDIOMYOPATHY: Chronic | ICD-10-CM

## 2019-04-02 DIAGNOSIS — I20.9 ANGINA PECTORIS (HCC): ICD-10-CM

## 2019-04-02 LAB
ANION GAP SERPL CALCULATED.3IONS-SCNC: 5 MMOL/L (ref 4–13)
BASOPHILS # BLD AUTO: 0.15 THOUSANDS/ΜL (ref 0–0.1)
BASOPHILS NFR BLD AUTO: 1 % (ref 0–1)
BUN SERPL-MCNC: 23 MG/DL (ref 5–25)
CALCIUM SERPL-MCNC: 9.6 MG/DL (ref 8.3–10.1)
CHLORIDE SERPL-SCNC: 104 MMOL/L (ref 100–108)
CO2 SERPL-SCNC: 27 MMOL/L (ref 21–32)
CREAT SERPL-MCNC: 1.62 MG/DL (ref 0.6–1.3)
EOSINOPHIL # BLD AUTO: 0.72 THOUSAND/ΜL (ref 0–0.61)
EOSINOPHIL NFR BLD AUTO: 6 % (ref 0–6)
ERYTHROCYTE [DISTWIDTH] IN BLOOD BY AUTOMATED COUNT: 14.3 % (ref 11.6–15.1)
GFR SERPL CREATININE-BSD FRML MDRD: 43 ML/MIN/1.73SQ M
GLUCOSE P FAST SERPL-MCNC: 113 MG/DL (ref 65–99)
HCT VFR BLD AUTO: 48.8 % (ref 36.5–49.3)
HGB BLD-MCNC: 15.7 G/DL (ref 12–17)
IMM GRANULOCYTES # BLD AUTO: 0.06 THOUSAND/UL (ref 0–0.2)
IMM GRANULOCYTES NFR BLD AUTO: 1 % (ref 0–2)
LYMPHOCYTES # BLD AUTO: 2.84 THOUSANDS/ΜL (ref 0.6–4.47)
LYMPHOCYTES NFR BLD AUTO: 25 % (ref 14–44)
MCH RBC QN AUTO: 32.2 PG (ref 26.8–34.3)
MCHC RBC AUTO-ENTMCNC: 32.2 G/DL (ref 31.4–37.4)
MCV RBC AUTO: 100 FL (ref 82–98)
MONOCYTES # BLD AUTO: 1.44 THOUSAND/ΜL (ref 0.17–1.22)
MONOCYTES NFR BLD AUTO: 13 % (ref 4–12)
NEUTROPHILS # BLD AUTO: 5.99 THOUSANDS/ΜL (ref 1.85–7.62)
NEUTS SEG NFR BLD AUTO: 54 % (ref 43–75)
NRBC BLD AUTO-RTO: 0 /100 WBCS
PLATELET # BLD AUTO: 336 THOUSANDS/UL (ref 149–390)
PMV BLD AUTO: 9.7 FL (ref 8.9–12.7)
POTASSIUM SERPL-SCNC: 4.6 MMOL/L (ref 3.5–5.3)
RBC # BLD AUTO: 4.87 MILLION/UL (ref 3.88–5.62)
SODIUM SERPL-SCNC: 136 MMOL/L (ref 136–145)
TROPONIN I SERPL-MCNC: 0.05 NG/ML
WBC # BLD AUTO: 11.2 THOUSAND/UL (ref 4.31–10.16)

## 2019-04-02 PROCEDURE — 84484 ASSAY OF TROPONIN QUANT: CPT

## 2019-04-02 PROCEDURE — 85025 COMPLETE CBC W/AUTO DIFF WBC: CPT | Performed by: INTERNAL MEDICINE

## 2019-04-02 PROCEDURE — 80048 BASIC METABOLIC PNL TOTAL CA: CPT | Performed by: INTERNAL MEDICINE

## 2019-04-02 PROCEDURE — 36415 COLL VENOUS BLD VENIPUNCTURE: CPT

## 2019-04-11 ENCOUNTER — HOSPITAL ENCOUNTER (OUTPATIENT)
Dept: NON INVASIVE DIAGNOSTICS | Facility: CLINIC | Age: 69
Discharge: HOME | End: 2019-04-11
Payer: MEDICARE

## 2019-04-11 ENCOUNTER — OFFICE VISIT (OUTPATIENT)
Dept: CARDIOLOGY CLINIC | Facility: CLINIC | Age: 69
End: 2019-04-11
Payer: MEDICARE

## 2019-04-11 VITALS
HEIGHT: 70 IN | HEART RATE: 80 BPM | BODY MASS INDEX: 26.48 KG/M2 | SYSTOLIC BLOOD PRESSURE: 130 MMHG | WEIGHT: 185 LBS | DIASTOLIC BLOOD PRESSURE: 80 MMHG

## 2019-04-11 DIAGNOSIS — I25.5 ISCHEMIC CARDIOMYOPATHY: Chronic | ICD-10-CM

## 2019-04-11 DIAGNOSIS — F17.200 NICOTINE DEPENDENCE, UNCOMPLICATED, UNSPECIFIED NICOTINE PRODUCT TYPE: ICD-10-CM

## 2019-04-11 DIAGNOSIS — I48.0 PAF (PAROXYSMAL ATRIAL FIBRILLATION) (HCC): ICD-10-CM

## 2019-04-11 DIAGNOSIS — I47.2 VT (VENTRICULAR TACHYCARDIA) (HCC): ICD-10-CM

## 2019-04-11 DIAGNOSIS — Z95.1 S/P CABG (CORONARY ARTERY BYPASS GRAFT): Chronic | ICD-10-CM

## 2019-04-11 DIAGNOSIS — I10 ESSENTIAL HYPERTENSION: Chronic | ICD-10-CM

## 2019-04-11 DIAGNOSIS — I20.9 ANGINA PECTORIS (HCC): Primary | ICD-10-CM

## 2019-04-11 DIAGNOSIS — I20.9 ANGINA PECTORIS (HCC): ICD-10-CM

## 2019-04-11 DIAGNOSIS — Z95.810 ICD (IMPLANTABLE CARDIOVERTER-DEFIBRILLATOR), SINGLE, IN SITU: Chronic | ICD-10-CM

## 2019-04-11 PROCEDURE — 78452 HT MUSCLE IMAGE SPECT MULT: CPT

## 2019-04-11 PROCEDURE — 78452 HT MUSCLE IMAGE SPECT MULT: CPT | Performed by: INTERNAL MEDICINE

## 2019-04-11 PROCEDURE — A9502 TC99M TETROFOSMIN: HCPCS

## 2019-04-11 PROCEDURE — 93016 CV STRESS TEST SUPVJ ONLY: CPT | Performed by: INTERNAL MEDICINE

## 2019-04-11 PROCEDURE — 93018 CV STRESS TEST I&R ONLY: CPT | Performed by: INTERNAL MEDICINE

## 2019-04-11 PROCEDURE — 93017 CV STRESS TEST TRACING ONLY: CPT

## 2019-04-11 PROCEDURE — 99214 OFFICE O/P EST MOD 30 MIN: CPT | Performed by: INTERNAL MEDICINE

## 2019-04-11 RX ORDER — ISOSORBIDE MONONITRATE 30 MG/1
30 TABLET, EXTENDED RELEASE ORAL 2 TIMES DAILY
Qty: 180 TABLET | Refills: 3 | Status: SHIPPED | OUTPATIENT
Start: 2019-04-11 | End: 2019-10-22 | Stop reason: SDUPTHER

## 2019-04-11 RX ADMIN — REGADENOSON 0.4 MG: 0.08 INJECTION, SOLUTION INTRAVENOUS at 11:10

## 2019-04-12 LAB
ARRHY DURING EX: NORMAL
CHEST PAIN STATEMENT: NORMAL
MAX DIASTOLIC BP: 64 MMHG
MAX HEART RATE: 80 BPM
MAX PREDICTED HEART RATE: 152 BPM
MAX. SYSTOLIC BP: 106 MMHG
PROTOCOL NAME: NORMAL
REASON FOR TERMINATION: NORMAL
TARGET HR FORMULA: NORMAL
TEST INDICATION: NORMAL
TIME IN EXERCISE PHASE: NORMAL

## 2019-04-29 DIAGNOSIS — I25.5 ISCHEMIC CARDIOMYOPATHY: Primary | Chronic | ICD-10-CM

## 2019-04-29 DIAGNOSIS — I20.9 ANGINA PECTORIS (HCC): ICD-10-CM

## 2019-05-01 DIAGNOSIS — Z01.810 PREOP CARDIOVASCULAR EXAM: ICD-10-CM

## 2019-05-01 DIAGNOSIS — I42.9 CARDIOMYOPATHY, UNSPECIFIED TYPE (HCC): ICD-10-CM

## 2019-05-01 DIAGNOSIS — I50.9 CONGESTIVE HEART FAILURE, UNSPECIFIED HF CHRONICITY, UNSPECIFIED HEART FAILURE TYPE (HCC): ICD-10-CM

## 2019-05-01 DIAGNOSIS — R07.9 CHEST PAIN, UNSPECIFIED TYPE: Primary | ICD-10-CM

## 2019-05-03 ENCOUNTER — APPOINTMENT (OUTPATIENT)
Dept: LAB | Facility: MEDICAL CENTER | Age: 69
End: 2019-05-03
Payer: MEDICARE

## 2019-05-03 LAB
ANION GAP SERPL CALCULATED.3IONS-SCNC: 5 MMOL/L (ref 4–13)
BASOPHILS # BLD AUTO: 0.13 THOUSANDS/ΜL (ref 0–0.1)
BASOPHILS NFR BLD AUTO: 1 % (ref 0–1)
BUN SERPL-MCNC: 28 MG/DL (ref 5–25)
CALCIUM SERPL-MCNC: 9.2 MG/DL (ref 8.3–10.1)
CHLORIDE SERPL-SCNC: 105 MMOL/L (ref 100–108)
CO2 SERPL-SCNC: 26 MMOL/L (ref 21–32)
CREAT SERPL-MCNC: 1.49 MG/DL (ref 0.6–1.3)
EOSINOPHIL # BLD AUTO: 0.76 THOUSAND/ΜL (ref 0–0.61)
EOSINOPHIL NFR BLD AUTO: 8 % (ref 0–6)
ERYTHROCYTE [DISTWIDTH] IN BLOOD BY AUTOMATED COUNT: 14.7 % (ref 11.6–15.1)
GFR SERPL CREATININE-BSD FRML MDRD: 48 ML/MIN/1.73SQ M
GLUCOSE P FAST SERPL-MCNC: 104 MG/DL (ref 65–99)
HCT VFR BLD AUTO: 46.4 % (ref 36.5–49.3)
HGB BLD-MCNC: 15 G/DL (ref 12–17)
IMM GRANULOCYTES # BLD AUTO: 0.05 THOUSAND/UL (ref 0–0.2)
IMM GRANULOCYTES NFR BLD AUTO: 1 % (ref 0–2)
LYMPHOCYTES # BLD AUTO: 3.32 THOUSANDS/ΜL (ref 0.6–4.47)
LYMPHOCYTES NFR BLD AUTO: 33 % (ref 14–44)
MCH RBC QN AUTO: 32.1 PG (ref 26.8–34.3)
MCHC RBC AUTO-ENTMCNC: 32.3 G/DL (ref 31.4–37.4)
MCV RBC AUTO: 99 FL (ref 82–98)
MONOCYTES # BLD AUTO: 1.24 THOUSAND/ΜL (ref 0.17–1.22)
MONOCYTES NFR BLD AUTO: 12 % (ref 4–12)
NEUTROPHILS # BLD AUTO: 4.7 THOUSANDS/ΜL (ref 1.85–7.62)
NEUTS SEG NFR BLD AUTO: 45 % (ref 43–75)
NRBC BLD AUTO-RTO: 0 /100 WBCS
PLATELET # BLD AUTO: 331 THOUSANDS/UL (ref 149–390)
PMV BLD AUTO: 9.8 FL (ref 8.9–12.7)
POTASSIUM SERPL-SCNC: 4.6 MMOL/L (ref 3.5–5.3)
RBC # BLD AUTO: 4.67 MILLION/UL (ref 3.88–5.62)
SODIUM SERPL-SCNC: 136 MMOL/L (ref 136–145)
WBC # BLD AUTO: 10.2 THOUSAND/UL (ref 4.31–10.16)

## 2019-05-03 PROCEDURE — 36415 COLL VENOUS BLD VENIPUNCTURE: CPT

## 2019-05-03 PROCEDURE — 85025 COMPLETE CBC W/AUTO DIFF WBC: CPT

## 2019-05-03 PROCEDURE — 80048 BASIC METABOLIC PNL TOTAL CA: CPT

## 2019-05-09 ENCOUNTER — OFFICE VISIT (OUTPATIENT)
Dept: CARDIOLOGY CLINIC | Facility: CLINIC | Age: 69
End: 2019-05-09
Payer: MEDICARE

## 2019-05-09 VITALS
BODY MASS INDEX: 26.48 KG/M2 | WEIGHT: 185 LBS | HEART RATE: 80 BPM | SYSTOLIC BLOOD PRESSURE: 120 MMHG | DIASTOLIC BLOOD PRESSURE: 72 MMHG | HEIGHT: 70 IN

## 2019-05-09 DIAGNOSIS — I20.9 ANGINA PECTORIS (HCC): ICD-10-CM

## 2019-05-09 DIAGNOSIS — Z95.5 H/O HEART ARTERY STENT: Primary | ICD-10-CM

## 2019-05-09 DIAGNOSIS — I25.5 ISCHEMIC CARDIOMYOPATHY: ICD-10-CM

## 2019-05-09 DIAGNOSIS — I25.810 CORONARY ARTERY DISEASE INVOLVING CORONARY BYPASS GRAFT OF NATIVE HEART WITHOUT ANGINA PECTORIS: ICD-10-CM

## 2019-05-09 DIAGNOSIS — I10 ESSENTIAL HYPERTENSION: Chronic | ICD-10-CM

## 2019-05-09 DIAGNOSIS — I50.22 CHRONIC SYSTOLIC CONGESTIVE HEART FAILURE (HCC): ICD-10-CM

## 2019-05-09 DIAGNOSIS — I48.20 CHRONIC ATRIAL FIBRILLATION (HCC): ICD-10-CM

## 2019-05-09 PROCEDURE — 99214 OFFICE O/P EST MOD 30 MIN: CPT | Performed by: INTERNAL MEDICINE

## 2019-05-09 RX ORDER — CLOPIDOGREL BISULFATE 75 MG/1
75 TABLET ORAL DAILY
COMMUNITY
End: 2020-01-01 | Stop reason: SDUPTHER

## 2019-05-09 RX ORDER — ENALAPRIL MALEATE 5 MG/1
5 TABLET ORAL DAILY
Qty: 90 TABLET | Refills: 3 | Status: SHIPPED | OUTPATIENT
Start: 2019-05-09 | End: 2020-01-01 | Stop reason: SDUPTHER

## 2019-06-10 ENCOUNTER — APPOINTMENT (OUTPATIENT)
Dept: LAB | Facility: MEDICAL CENTER | Age: 69
End: 2019-06-10
Payer: MEDICARE

## 2019-06-10 DIAGNOSIS — I25.5 ISCHEMIC CARDIOMYOPATHY: ICD-10-CM

## 2019-06-10 DIAGNOSIS — I20.9 ANGINA PECTORIS (HCC): ICD-10-CM

## 2019-06-10 LAB
ANION GAP SERPL CALCULATED.3IONS-SCNC: 6 MMOL/L (ref 4–13)
BASOPHILS # BLD AUTO: 0.15 THOUSANDS/ΜL (ref 0–0.1)
BASOPHILS NFR BLD AUTO: 2 % (ref 0–1)
BUN SERPL-MCNC: 22 MG/DL (ref 5–25)
CALCIUM SERPL-MCNC: 9.1 MG/DL (ref 8.3–10.1)
CHLORIDE SERPL-SCNC: 109 MMOL/L (ref 100–108)
CO2 SERPL-SCNC: 26 MMOL/L (ref 21–32)
CREAT SERPL-MCNC: 1.29 MG/DL (ref 0.6–1.3)
EOSINOPHIL # BLD AUTO: 0.67 THOUSAND/ΜL (ref 0–0.61)
EOSINOPHIL NFR BLD AUTO: 7 % (ref 0–6)
ERYTHROCYTE [DISTWIDTH] IN BLOOD BY AUTOMATED COUNT: 15.1 % (ref 11.6–15.1)
GFR SERPL CREATININE-BSD FRML MDRD: 57 ML/MIN/1.73SQ M
GLUCOSE P FAST SERPL-MCNC: 115 MG/DL (ref 65–99)
HCT VFR BLD AUTO: 44.3 % (ref 36.5–49.3)
HGB BLD-MCNC: 14.3 G/DL (ref 12–17)
IMM GRANULOCYTES # BLD AUTO: 0.03 THOUSAND/UL (ref 0–0.2)
IMM GRANULOCYTES NFR BLD AUTO: 0 % (ref 0–2)
LYMPHOCYTES # BLD AUTO: 2.37 THOUSANDS/ΜL (ref 0.6–4.47)
LYMPHOCYTES NFR BLD AUTO: 25 % (ref 14–44)
MCH RBC QN AUTO: 32.6 PG (ref 26.8–34.3)
MCHC RBC AUTO-ENTMCNC: 32.3 G/DL (ref 31.4–37.4)
MCV RBC AUTO: 101 FL (ref 82–98)
MONOCYTES # BLD AUTO: 1.47 THOUSAND/ΜL (ref 0.17–1.22)
MONOCYTES NFR BLD AUTO: 15 % (ref 4–12)
NEUTROPHILS # BLD AUTO: 4.87 THOUSANDS/ΜL (ref 1.85–7.62)
NEUTS SEG NFR BLD AUTO: 51 % (ref 43–75)
NRBC BLD AUTO-RTO: 0 /100 WBCS
PLATELET # BLD AUTO: 301 THOUSANDS/UL (ref 149–390)
PMV BLD AUTO: 9.4 FL (ref 8.9–12.7)
POTASSIUM SERPL-SCNC: 4.6 MMOL/L (ref 3.5–5.3)
RBC # BLD AUTO: 4.39 MILLION/UL (ref 3.88–5.62)
SODIUM SERPL-SCNC: 141 MMOL/L (ref 136–145)
WBC # BLD AUTO: 9.56 THOUSAND/UL (ref 4.31–10.16)

## 2019-06-10 PROCEDURE — 85025 COMPLETE CBC W/AUTO DIFF WBC: CPT

## 2019-06-10 PROCEDURE — 80048 BASIC METABOLIC PNL TOTAL CA: CPT

## 2019-06-10 PROCEDURE — 36415 COLL VENOUS BLD VENIPUNCTURE: CPT

## 2019-06-20 ENCOUNTER — OFFICE VISIT (OUTPATIENT)
Dept: CARDIOLOGY CLINIC | Facility: CLINIC | Age: 69
End: 2019-06-20
Payer: MEDICARE

## 2019-06-20 VITALS
BODY MASS INDEX: 26.48 KG/M2 | DIASTOLIC BLOOD PRESSURE: 60 MMHG | HEART RATE: 80 BPM | WEIGHT: 185 LBS | HEIGHT: 70 IN | SYSTOLIC BLOOD PRESSURE: 118 MMHG

## 2019-06-20 DIAGNOSIS — I10 ESSENTIAL HYPERTENSION: Chronic | ICD-10-CM

## 2019-06-20 DIAGNOSIS — I10 HYPERTENSION, UNSPECIFIED TYPE: ICD-10-CM

## 2019-06-20 DIAGNOSIS — I25.10 CORONARY ARTERIOSCLEROSIS IN NATIVE ARTERY: ICD-10-CM

## 2019-06-20 DIAGNOSIS — I48.91 ATRIAL FIBRILLATION, UNSPECIFIED TYPE (HCC): ICD-10-CM

## 2019-06-20 DIAGNOSIS — I48.20 CHRONIC ATRIAL FIBRILLATION (HCC): ICD-10-CM

## 2019-06-20 DIAGNOSIS — I70.213 ATHEROSCLEROSIS OF NATIVE ARTERY OF BOTH LOWER EXTREMITIES WITH INTERMITTENT CLAUDICATION (HCC): ICD-10-CM

## 2019-06-20 DIAGNOSIS — Z95.1 S/P CABG (CORONARY ARTERY BYPASS GRAFT): Chronic | ICD-10-CM

## 2019-06-20 DIAGNOSIS — I25.810 CORONARY ARTERY DISEASE INVOLVING CORONARY BYPASS GRAFT OF NATIVE HEART WITHOUT ANGINA PECTORIS: ICD-10-CM

## 2019-06-20 DIAGNOSIS — I35.9 AORTIC VALVE DISORDER: Primary | ICD-10-CM

## 2019-06-20 DIAGNOSIS — I73.9 PVD (PERIPHERAL VASCULAR DISEASE) (HCC): Chronic | ICD-10-CM

## 2019-06-20 DIAGNOSIS — I65.29 OCCLUSION AND STENOSIS OF UNSPECIFIED CAROTID ARTERY: ICD-10-CM

## 2019-06-20 DIAGNOSIS — I50.22 CHRONIC SYSTOLIC CONGESTIVE HEART FAILURE (HCC): ICD-10-CM

## 2019-06-20 DIAGNOSIS — I25.5 ISCHEMIC CARDIOMYOPATHY: Chronic | ICD-10-CM

## 2019-06-20 DIAGNOSIS — Z72.0 TOBACCO ABUSE: Chronic | ICD-10-CM

## 2019-06-20 DIAGNOSIS — E78.2 MIXED HYPERLIPIDEMIA: ICD-10-CM

## 2019-06-20 PROCEDURE — 99215 OFFICE O/P EST HI 40 MIN: CPT | Performed by: PHYSICIAN ASSISTANT

## 2019-06-20 RX ORDER — METOPROLOL SUCCINATE 25 MG/1
25 TABLET, EXTENDED RELEASE ORAL DAILY
Qty: 90 TABLET | Refills: 3 | Status: SHIPPED | OUTPATIENT
Start: 2019-06-20 | End: 2020-01-01 | Stop reason: SDUPTHER

## 2019-06-20 RX ORDER — METOPROLOL SUCCINATE 100 MG/1
100 TABLET, EXTENDED RELEASE ORAL DAILY
Qty: 90 TABLET | Refills: 3 | Status: SHIPPED | OUTPATIENT
Start: 2019-06-20 | End: 2020-01-01 | Stop reason: SDUPTHER

## 2019-08-22 ENCOUNTER — OFFICE VISIT (OUTPATIENT)
Dept: CARDIOLOGY CLINIC | Facility: CLINIC | Age: 69
End: 2019-08-22
Payer: MEDICARE

## 2019-08-22 ENCOUNTER — IN-CLINIC DEVICE VISIT (OUTPATIENT)
Dept: CARDIOLOGY CLINIC | Facility: CLINIC | Age: 69
End: 2019-08-22
Payer: MEDICARE

## 2019-08-22 VITALS
BODY MASS INDEX: 26.48 KG/M2 | WEIGHT: 185 LBS | HEIGHT: 70 IN | HEART RATE: 80 BPM | SYSTOLIC BLOOD PRESSURE: 118 MMHG | DIASTOLIC BLOOD PRESSURE: 62 MMHG

## 2019-08-22 DIAGNOSIS — I47.2 VENTRICULAR TACHYCARDIA (HCC): ICD-10-CM

## 2019-08-22 DIAGNOSIS — I70.213 ATHEROSCLEROSIS OF NATIVE ARTERY OF BOTH LOWER EXTREMITIES WITH INTERMITTENT CLAUDICATION (HCC): Primary | ICD-10-CM

## 2019-08-22 DIAGNOSIS — I48.20 CHRONIC ATRIAL FIBRILLATION (HCC): ICD-10-CM

## 2019-08-22 DIAGNOSIS — Z95.1 S/P CABG (CORONARY ARTERY BYPASS GRAFT): Chronic | ICD-10-CM

## 2019-08-22 DIAGNOSIS — Z95.810 ICD (IMPLANTABLE CARDIOVERTER-DEFIBRILLATOR), SINGLE, IN SITU: Chronic | ICD-10-CM

## 2019-08-22 DIAGNOSIS — E78.2 MIXED HYPERLIPIDEMIA: ICD-10-CM

## 2019-08-22 DIAGNOSIS — I25.9 CHRONIC ISCHEMIC HEART DISEASE: ICD-10-CM

## 2019-08-22 DIAGNOSIS — I25.5 ISCHEMIC CARDIOMYOPATHY: Primary | ICD-10-CM

## 2019-08-22 DIAGNOSIS — I73.9 PVD (PERIPHERAL VASCULAR DISEASE) (HCC): Chronic | ICD-10-CM

## 2019-08-22 DIAGNOSIS — Z45.02 ENCOUNTER FOR IMPLANTABLE DEFIBRILLATOR REPROGRAMMING OR CHECK: ICD-10-CM

## 2019-08-22 DIAGNOSIS — I50.22 CHRONIC SYSTOLIC CONGESTIVE HEART FAILURE (HCC): ICD-10-CM

## 2019-08-22 DIAGNOSIS — I47.2 VT (VENTRICULAR TACHYCARDIA) (HCC): ICD-10-CM

## 2019-08-22 DIAGNOSIS — I25.5 ISCHEMIC CARDIOMYOPATHY: ICD-10-CM

## 2019-08-22 PROCEDURE — 93282 PRGRMG EVAL IMPLANTABLE DFB: CPT | Performed by: INTERNAL MEDICINE

## 2019-08-22 PROCEDURE — 99214 OFFICE O/P EST MOD 30 MIN: CPT | Performed by: PHYSICIAN ASSISTANT

## 2019-08-22 NOTE — ASSESSMENT & PLAN NOTE
Recent in-graft stenting of the SVG to the OM-2  Chronically occluded SVG to the Diagonal and PDA  LIMA to LAD is patent

## 2019-08-22 NOTE — PROGRESS NOTES
[facility]   Outpatient Note  Jose Sifuentes  1950  [id number]  [facility] [address] [place] [id number] [phone]    Jose Sifuentes is a 71 y o  male    Assessment and Plan:     Problem List Items Addressed This Visit        Cardiovascular and Mediastinum    PVD (peripheral vascular disease) ([address] ) (Chronic)     No longer on ASA--only plavix    Follows with Dr Ardith Hammans          Ischemic cardiomyopathy (Chronic)    Relevant Orders    Echo complete with contrast if indicated    Atherosclerosis of native artery of both lower extremities with intermittent claudication ([address] ) - Primary     Recent in-graft stenting of the SVG to the OM-2  Chronically occluded SVG to the Diagonal and PDA  LIMA to LAD is patent         Relevant Orders    Echo complete with contrast if indicated    VT (ventricular tachycardia) (HCC)    CHF (congestive heart failure) (HCC)     Weight is steady and euvolemic   Continue current regimen    Last Ef is 25-30%             Chronic ischemic heart disease     Last Ef is 25-30%         Chronic atrial fibrillation (HCC)     Rate is controlled   Continue on Xarelto--Samples given today                Other    S/P CABG (coronary artery bypass graft) (Chronic)    ICD (implantable cardioverter-defibrillator), single, in situ (Chronic)     Interrogated today-Normal         Mixed hyperlipidemia     Tolerating statin therapy                  Additional Plan:   No changes in medication made today    Appointment will be made for a device check in about Three months and a follow up for the same day  He will call if there are an problems prior  Subjective: The patient is in the office today for a routine visit  He has a history of ischemic CM with EF or 25-30%  CAD with CABG and in-graft stenting on last catheterization in may of this year  He also has R-CEA, PVD, HTN, HLD and Chronid systolic CHF  Remarkably he is feeling well today without complaints  He has felt well since may when he had cath and stenting  He has had no occasion to take NTG  He has no exertional symptoms  He has no edema orthopnea or PND  He has no palpitation or syncope  Hypertension   Pertinent negatives include no chest pain, orthopnea, palpitations, PND or shortness of breath  Congestive Heart Failure   Pertinent negatives include no chest pain, claudication, near-syncope, palpitations or shortness of breath  Social History  Social History     Tobacco Use   Smoking Status Current Every Day Smoker    Packs/day: 0 50    Types: Cigarettes   Smokeless Tobacco Never Used   Tobacco Comment    Pt states he is trying to cut back   ,   Social History     Substance and Sexual Activity   Alcohol Use Yes    Comment: social   ,   Social History     Substance and Sexual Activity   Drug Use No     Family History   Problem Relation Age of Onset    Colon polyps Brother         polyps of the sigmoid colon    Coronary artery disease Family         Less than 61 yrs of age       Medical and Surgical History  Past Medical History:   Diagnosis Date    CAD (coronary artery disease)     Chronic atrial fibrillation ([facility] )     Coronary angioplasty status     Essential hypertension 3/20/2018    Heart disease     Hx of echocardiogram 03/11/2015    EF0 30 (90%) Mod LV systolic dysfunction  Mild concentric LVH  Trace tricuspid regurg w/o significant pulmo htn  Pacer wire traversing the RA/RV / ICE ejection; EF 0 25 (25%) severe segmental LV systolic dysfunction  Mild concentric LV hypertrophy  Mild MR  Mild pulm htn  8/25/17      Hx of myocardial perfusion scan 09/20/2016    Lexiscan MPI; EF0 24 (24%) prior basal inferior, posterobasal, basal septal myocardial infarctions  Small area of basal lateral wall ischemia by perfusion imaging      Hypertension     Ischemic cardiomyopathy 3/20/2018    Ischemic cardiomyopathy     Mixed hyperlipidemia     Neuropathy     Occlusion and stenosis of unspecified carotid artery     Peripheral arterial disease ([facility] ) 3/20/2018    PVD (peripheral vascular disease) ([facility] )     S/P CABG (coronary artery bypass graft) 3/20/2018    S/P implantation of automatic cardioverter/defibrillator (AICD) 3/20/2018    Tobacco abuse 03/20/2018    nicotine dependence    [facility] ([facility] )     Vitamin D deficiency      Past Surgical History:   Procedure Laterality Date    CARDIAC CATHETERIZATION  1999    aterial gopepyinqzyydxy-fxfmd-3/29/1999  / EF0 35 (35%) Multivessel CAD no intervention with medical management recommended  Graft to distal LAD: occluded SVG  Graft to D1: occluded SVG  Graft to OM1:SVG 6/8/17  / Known triple vessel disease   Patent LIMA and SVG to OM 3/30/18    CARDIAC DEFIBRILLATOR PLACEMENT  2009    implantable lnqkselsozev-zviyupvnheggb-offwg-April 2009    CORONARY ARTERY BYPASS GRAFT  1999    LIMA-LAD, SVG-Diag, SVG-OM, SVG-Circ PL    OTHER SURGICAL HISTORY      cath stent placement         Current Outpatient Medications:     atorvastatin (LIPITOR) 40 mg tablet, Take 1 tablet (40 mg total) by mouth daily, Disp: 90 tablet, Rfl: 3    clopidogrel (PLAVIX) 75 mg tablet, Take 75 mg by mouth daily, Disp: , Rfl:     enalapril (VASOTEC) 5 mg tablet, Take 1 tablet (5 mg total) by mouth daily, Disp: 90 tablet, Rfl: 3    furosemide (LASIX) 20 mg tablet, Take 1 tablet (20 mg total) by mouth daily, Disp: 90 tablet, Rfl: 3    isosorbide mononitrate (IMDUR) 30 mg 24 hr tablet, Take 1 tablet (30 mg total) by mouth 2 (two) times a day, Disp: 180 tablet, Rfl: 3    metoprolol succinate (TOPROL-XL) 100 mg 24 hr tablet, Take 1 tablet (100 mg total) by mouth daily, Disp: 90 tablet, Rfl: 3    metoprolol succinate (TOPROL-XL) 25 mg 24 hr tablet, Take 1 tablet (25 mg total) by mouth daily Take in PM, Disp: 90 tablet, Rfl: 3    mexiletine (MEXITIL) 150 mg capsule, Take 150 mg by mouth One twice a day, Disp: , Rfl:     nitroglycerin (NITROSTAT) 0 4 mg SL tablet, Place 1 tablet (0 4 mg total) under the tongue every 5 (five) minutes as needed for chest pain At the first sign of attack; take as directed, Disp: 25 tablet, Rfl: 5    potassium chloride (K-DUR,KLOR-CON) 20 mEq tablet, Take 1 tablet (20 mEq total) by mouth daily, Disp: 30 tablet, Rfl: 0    rivaroxaban (XARELTO) 20 mg tablet, Take 1 tablet (20 mg total) by mouth daily, Disp: 90 tablet, Rfl: 3    spironolactone (ALDACTONE) 25 mg tablet, Take 1 tablet (25 mg total) by mouth daily, Disp: 90 tablet, Rfl: 3  Allergies   Allergen Reactions    Epinephrine     Nifedipine      Procardia    Penicillins        Review of Systems   Constitution: Negative  HENT: Negative  Eyes: Negative  Cardiovascular: Negative  Negative for chest pain, claudication, cyanosis, dyspnea on exertion, irregular heartbeat, leg swelling, near-syncope, orthopnea, palpitations, paroxysmal nocturnal dyspnea and syncope  Respiratory: Negative  Negative for cough, hemoptysis, shortness of breath, sleep disturbances due to breathing, snoring, sputum production and wheezing  Endocrine: Negative  Hematologic/Lymphatic: Negative  Skin: Negative  Musculoskeletal: Negative  Gastrointestinal: Negative  Genitourinary: Negative  Neurological: Negative  Psychiatric/Behavioral: Negative  Allergic/Immunologic: Negative  Objective:   /62   Pulse 80   Ht 5' 10" (1 778 m)   Wt 83 9 kg (185 lb)   BMI 26 54 kg/m²   Physical Exam   Constitutional: He is oriented to person, place, and time  He appears well-developed and well-nourished  No distress  HENT:   Head: Normocephalic and atraumatic  Eyes: Pupils are equal, round, and reactive to light  EOM are normal  No scleral icterus  Neck: Normal range of motion  Neck supple  No JVD present  No thyromegaly present  Right scar-CEA   Cardiovascular: Normal rate   An irregularly irregular rhythm present  Exam reveals no gallop and no friction rub  Murmur heard  Systolic murmur is present with a grade of 2/6 radiating to the axilla  Pulses:       Dorsalis pedis pulses are 1+ on the right side, and 1+ on the left side  Posterior tibial pulses are 1+ on the right side, and 1+ on the left side  Sternal scar   Pulmonary/Chest: Effort normal and breath sounds normal  No stridor  No respiratory distress  He has no wheezes  He has no rales  Abdominal: Soft  Bowel sounds are normal  He exhibits no distension and no mass  There is no tenderness  Musculoskeletal: Normal range of motion  He exhibits no edema or deformity  Neurological: He is alert and oriented to person, place, and time  Coordination normal    Skin: Skin is warm and dry  No erythema  No pallor  Psychiatric: He has a normal mood and affect  His behavior is normal        Lab Review:   Lab Results   Component Value Date    CHOL 173 10/07/2015     Lab Results   Component Value Date    HDL 35 (L) 01/08/2018     Lab Results   Component Value Date    LDLCALC 98 01/08/2018     Lab Results   Component Value Date    TRIG 90 01/08/2018     Results Reviewed     None        Results Reviewed     None        Results Reviewed     None          Recent Cardiovascular Testing:   Echo 4-82-77: Systolic function was severely reduced  Ejection fraction was estimated in the range of 25 % to 30 %  There was severe diffuse hypokinesis with regional variations  There was akinesis of the basal-mid anteroseptal and basal-mid inferior wall(s)  Wall thickness was mildly increased  Moderate MR  Cardiac Cath: 5-7-19:  Systemic arterial pressure is normotensive  Left ventricular end   diastolic pressure is moderately elevated  · 1st lesion: Prox Graft lesion is 95% stenosed  Following a successful   intervention there was an excellent angiographic appearance with residual   stenosis reduced to 0%  · There is severe three vessel CAD    · The left subclavian stent is patent  · The JAMAR to LAD is patent  The SVG to OM2 had a severe stenosis   successfully treated with a JED  · The SVG to the Diagonal and PDA are chronically occluded      Last Device interrogation on record 8-23-18: normal interrogation    ECG Review:   5-7-19: ATRIAL FIBRILLATION WITH SLOW VENTRICULAR RESPONSE, INCOMPLETE LEFT BUNDLE BRANCH BLOCK

## 2019-08-22 NOTE — PROGRESS NOTES
device check ICD  No events  Normal battery function      Current Outpatient Medications:     atorvastatin (LIPITOR) 40 mg tablet, Take 1 tablet (40 mg total) by mouth daily, Disp: 90 tablet, Rfl: 3    clopidogrel (PLAVIX) 75 mg tablet, Take 75 mg by mouth daily, Disp: , Rfl:     enalapril (VASOTEC) 5 mg tablet, Take 1 tablet (5 mg total) by mouth daily, Disp: 90 tablet, Rfl: 3    furosemide (LASIX) 20 mg tablet, Take 1 tablet (20 mg total) by mouth daily, Disp: 90 tablet, Rfl: 3    isosorbide mononitrate (IMDUR) 30 mg 24 hr tablet, Take 1 tablet (30 mg total) by mouth 2 (two) times a day, Disp: 180 tablet, Rfl: 3    metoprolol succinate (TOPROL-XL) 100 mg 24 hr tablet, Take 1 tablet (100 mg total) by mouth daily, Disp: 90 tablet, Rfl: 3    metoprolol succinate (TOPROL-XL) 25 mg 24 hr tablet, Take 1 tablet (25 mg total) by mouth daily Take in PM, Disp: 90 tablet, Rfl: 3    mexiletine (MEXITIL) 150 mg capsule, Take 150 mg by mouth One twice a day, Disp: , Rfl:     nitroglycerin (NITROSTAT) 0 4 mg SL tablet, Place 1 tablet (0 4 mg total) under the tongue every 5 (five) minutes as needed for chest pain At the first sign of attack; take as directed, Disp: 25 tablet, Rfl: 5    potassium chloride (K-DUR,KLOR-CON) 20 mEq tablet, Take 1 tablet (20 mEq total) by mouth daily, Disp: 30 tablet, Rfl: 0    rivaroxaban (XARELTO) 20 mg tablet, Take 1 tablet (20 mg total) by mouth daily, Disp: 90 tablet, Rfl: 3    spironolactone (ALDACTONE) 25 mg tablet, Take 1 tablet (25 mg total) by mouth daily, Disp: 90 tablet, Rfl: 3

## 2019-10-08 ENCOUNTER — TELEPHONE (OUTPATIENT)
Dept: INTERNAL MEDICINE CLINIC | Facility: CLINIC | Age: 69
End: 2019-10-08

## 2019-10-08 NOTE — TELEPHONE ENCOUNTER
Please call patient to schedule AWV if he is still coming to our practice  He is overdue for exam, thank you

## 2019-10-15 ENCOUNTER — IMMUNIZATIONS (OUTPATIENT)
Dept: INTERNAL MEDICINE CLINIC | Facility: CLINIC | Age: 69
End: 2019-10-15
Payer: MEDICARE

## 2019-10-15 DIAGNOSIS — Z23 ENCOUNTER FOR IMMUNIZATION: ICD-10-CM

## 2019-10-15 PROCEDURE — G0008 ADMIN INFLUENZA VIRUS VAC: HCPCS

## 2019-10-15 PROCEDURE — 90682 RIV4 VACC RECOMBINANT DNA IM: CPT

## 2019-10-22 DIAGNOSIS — I20.9 ANGINA PECTORIS (HCC): ICD-10-CM

## 2019-10-22 DIAGNOSIS — E78.2 MIXED HYPERLIPIDEMIA: ICD-10-CM

## 2019-10-22 DIAGNOSIS — I48.20 CHRONIC A-FIB (HCC): ICD-10-CM

## 2019-10-22 DIAGNOSIS — I25.5 ISCHEMIC CARDIOMYOPATHY: Chronic | ICD-10-CM

## 2019-10-22 RX ORDER — ATORVASTATIN CALCIUM 40 MG/1
40 TABLET, FILM COATED ORAL DAILY
Qty: 90 TABLET | Refills: 3 | Status: SHIPPED | OUTPATIENT
Start: 2019-10-22 | End: 2020-01-01

## 2019-10-22 RX ORDER — ISOSORBIDE MONONITRATE 30 MG/1
30 TABLET, EXTENDED RELEASE ORAL 2 TIMES DAILY
Qty: 180 TABLET | Refills: 3 | Status: SHIPPED | OUTPATIENT
Start: 2019-10-22 | End: 2020-01-01

## 2019-11-14 ENCOUNTER — HOSPITAL ENCOUNTER (OUTPATIENT)
Dept: NON INVASIVE DIAGNOSTICS | Facility: CLINIC | Age: 69
Discharge: HOME | End: 2019-11-14
Payer: MEDICARE

## 2019-11-14 DIAGNOSIS — I25.5 ISCHEMIC CARDIOMYOPATHY: ICD-10-CM

## 2019-11-14 DIAGNOSIS — I70.213 ATHEROSCLEROSIS OF NATIVE ARTERY OF BOTH LOWER EXTREMITIES WITH INTERMITTENT CLAUDICATION (HCC): ICD-10-CM

## 2019-11-14 PROCEDURE — 93306 TTE W/DOPPLER COMPLETE: CPT

## 2019-11-14 PROCEDURE — 93306 TTE W/DOPPLER COMPLETE: CPT | Performed by: INTERNAL MEDICINE

## 2019-11-22 ENCOUNTER — REMOTE DEVICE CLINIC VISIT (OUTPATIENT)
Dept: CARDIOLOGY CLINIC | Facility: CLINIC | Age: 69
End: 2019-11-22
Payer: MEDICARE

## 2019-11-22 DIAGNOSIS — I25.5 ISCHEMIC CARDIOMYOPATHY: Primary | ICD-10-CM

## 2019-11-22 DIAGNOSIS — Z45.02 ENCOUNTER FOR IMPLANTABLE DEFIBRILLATOR REPROGRAMMING OR CHECK: ICD-10-CM

## 2019-11-22 PROCEDURE — 93296 REM INTERROG EVL PM/IDS: CPT | Performed by: INTERNAL MEDICINE

## 2019-11-22 PROCEDURE — 93295 DEV INTERROG REMOTE 1/2/MLT: CPT | Performed by: INTERNAL MEDICINE

## 2019-11-25 ENCOUNTER — OFFICE VISIT (OUTPATIENT)
Dept: CARDIOLOGY CLINIC | Facility: CLINIC | Age: 69
End: 2019-11-25
Payer: MEDICARE

## 2019-11-25 VITALS
DIASTOLIC BLOOD PRESSURE: 60 MMHG | HEIGHT: 70 IN | WEIGHT: 186 LBS | HEART RATE: 70 BPM | SYSTOLIC BLOOD PRESSURE: 100 MMHG | BODY MASS INDEX: 26.63 KG/M2

## 2019-11-25 DIAGNOSIS — Z95.810 ICD (IMPLANTABLE CARDIOVERTER-DEFIBRILLATOR), SINGLE, IN SITU: Chronic | ICD-10-CM

## 2019-11-25 DIAGNOSIS — Z95.1 S/P CABG (CORONARY ARTERY BYPASS GRAFT): Chronic | ICD-10-CM

## 2019-11-25 DIAGNOSIS — E78.2 MIXED HYPERLIPIDEMIA: ICD-10-CM

## 2019-11-25 DIAGNOSIS — I25.5 ISCHEMIC CARDIOMYOPATHY: Primary | ICD-10-CM

## 2019-11-25 DIAGNOSIS — I48.20 CHRONIC ATRIAL FIBRILLATION (HCC): ICD-10-CM

## 2019-11-25 DIAGNOSIS — I10 ESSENTIAL HYPERTENSION: Chronic | ICD-10-CM

## 2019-11-25 DIAGNOSIS — Z95.5 H/O HEART ARTERY STENT: ICD-10-CM

## 2019-11-25 PROCEDURE — 99214 OFFICE O/P EST MOD 30 MIN: CPT | Performed by: INTERNAL MEDICINE

## 2019-11-25 NOTE — PROGRESS NOTES
Subjective:        Patient ID: Franklin Steward is a 71 y o  male  Chief Complaint:  Landon Long is here for routine follow-up  He feels well  Not using any nitrates, having no chest pains  Denies any concerning shortness of breath edema orthopnea  No palpitations shocks  On questioning his claudication is moderate and stable nonprogressive  He has no unilateral symptoms  We reviewed his echocardiogram in detail today, stable findings  The following portions of the patient's history were reviewed and updated as appropriate: allergies, current medications, past family history, past medical history, past social history, past surgical history and problem list   Review of Systems   Constitution: Negative for chills, diaphoresis, malaise/fatigue and weight gain  HENT: Negative for nosebleeds and stridor  Eyes: Negative for double vision, vision loss in left eye, vision loss in right eye and visual disturbance  Cardiovascular: Negative for chest pain, claudication, cyanosis, dyspnea on exertion, irregular heartbeat, leg swelling, near-syncope, orthopnea, palpitations, paroxysmal nocturnal dyspnea and syncope  Respiratory: Negative for cough, shortness of breath, snoring and wheezing  Endocrine: Negative for polydipsia, polyphagia and polyuria  Hematologic/Lymphatic: Negative for bleeding problem  Does not bruise/bleed easily  Skin: Negative for flushing and rash  Musculoskeletal: Negative for falls and myalgias  Gastrointestinal: Negative for abdominal pain, heartburn, hematemesis, hematochezia, melena and nausea  Genitourinary: Negative for hematuria  Neurological: Negative for brief paralysis, dizziness, focal weakness, headaches, light-headedness, loss of balance and vertigo  Psychiatric/Behavioral: Negative for altered mental status and substance abuse  Allergic/Immunologic: Negative for hives            Objective:      /60   Pulse 70   Ht 5' 10" (1 778 m)   Wt 84 4 kg (186 lb)   BMI 26 69 kg/m²   Physical Exam   Constitutional: He is oriented to person, place, and time  He appears well-developed and well-nourished  No distress  HENT:   Head: Normocephalic and atraumatic  Eyes: Pupils are equal, round, and reactive to light  EOM are normal  No scleral icterus  Neck: Normal range of motion  Neck supple  No JVD present  No thyromegaly present  Cardiovascular: Normal rate, regular rhythm and normal heart sounds  Exam reveals no gallop and no friction rub  No murmur heard  Pulmonary/Chest: Effort normal and breath sounds normal  No stridor  No respiratory distress  He has no wheezes  He has no rales  Abdominal: Soft  Bowel sounds are normal  He exhibits no distension and no mass  There is no tenderness  Musculoskeletal: Normal range of motion  He exhibits no edema or deformity  Neurological: He is alert and oriented to person, place, and time  Coordination normal    Skin: Skin is warm and dry  No erythema  No pallor  Psychiatric: He has a normal mood and affect  His behavior is normal        Lab Review:   No visits with results within 2 Month(s) from this visit     Latest known visit with results is:   Appointment on 06/10/2019   Component Date Value    WBC 06/10/2019 9 56     RBC 06/10/2019 4 39     Hemoglobin 06/10/2019 14 3     Hematocrit 06/10/2019 44 3     MCV 06/10/2019 101*    MCH 06/10/2019 32 6     MCHC 06/10/2019 32 3     RDW 06/10/2019 15 1     MPV 06/10/2019 9 4     Platelets 13/54/9895 301     nRBC 06/10/2019 0     Neutrophils Relative 06/10/2019 51     Immat GRANS % 06/10/2019 0     Lymphocytes Relative 06/10/2019 25     Monocytes Relative 06/10/2019 15*    Eosinophils Relative 06/10/2019 7*    Basophils Relative 06/10/2019 2*    Neutrophils Absolute 06/10/2019 4 87     Immature Grans Absolute 06/10/2019 0 03     Lymphocytes Absolute 06/10/2019 2 37     Monocytes Absolute 06/10/2019 1 47*    Eosinophils Absolute 06/10/2019 0 67*    Basophils Absolute 06/10/2019 0 15*    Sodium 06/10/2019 141     Potassium 06/10/2019 4 6     Chloride 06/10/2019 109*    CO2 06/10/2019 26     ANION GAP 06/10/2019 6     BUN 06/10/2019 22     Creatinine 06/10/2019 1 29     Glucose, Fasting 06/10/2019 115*    Calcium 06/10/2019 9 1     eGFR 06/10/2019 57      No results found  Assessment:       1  Ischemic cardiomyopathy  Comprehensive metabolic panel    Lipid panel   2  ICD (implantable cardioverter-defibrillator), single, in situ     3  S/P CABG (coronary artery bypass graft)     4  H/O heart artery stent     5  Mixed hyperlipidemia     6  Chronic atrial fibrillation     7  Essential hypertension          Plan:       Solomon Nicole is without signs or symptoms reminiscent of angina, decompensated heart failure nor electrical instability  Meds are voluminous, optimal, he has cut down on his own mexiletine 150 mg p o  B i d  Which I do not object to  I ordered a CMP and fasting lipid profile in the near term  Blood pressure and heart rate is well controlled  He has no symptoms of progressive claudication nor TIA/CVA  I will see him back in 3-4 months, sooner as needed

## 2019-11-25 NOTE — PATIENT INSTRUCTIONS
Coronary Artery Disease   AMBULATORY CARE:   Coronary artery disease (CAD)  is narrowing of the arteries to your heart caused by a buildup of plaque  Plaque is made up of cholesterol and other substances  The narrowing in your arteries decreases the amount of blood that can flow to your heart  This causes your heart to get less oxygen  You may not have any symptoms of CAD  It is important for you to manage CAD even if you feel well  CAD can lead to a heart attack if it is not managed  Common symptoms include the following:   · Chest pain (angina), causing burning, squeezing, or crushing tightness in your chest    · Pain that spreads to your neck, jaw, or shoulder blade    · Nausea, vomiting, sweating, fainting, and hands and feet that are cold to the touch  Call 911 for any of the following:   · You have any of the following signs of a heart attack:      ¨ Squeezing, pressure, or pain in your chest that lasts longer than 5 minutes or returns    ¨ Discomfort or pain in your back, neck, jaw, stomach, or arm     ¨ Trouble breathing    ¨ Nausea or vomiting    ¨ Lightheadedness or a sudden cold sweat, especially with chest pain or trouble breathing    Contact your healthcare provider if:   · You have chest pain that is more frequent, or you have chest pain at rest     · You have questions or concerns about your condition or care  Medicines used to treat CAD:   · Blood pressure medicines  are given to lower your blood pressure  ACE inhibitors help keep your blood vessels relaxed and open to help keep blood flowing into your heart  Beta-blockers keep your heart pumping strongly and regularly so it does not work too hard to get oxygen  · Cholesterol medicines  help lower blood cholesterol levels  · Nitrates , such as nitroglycerin, relax the arteries of your heart so it gets more oxygen  They help to relieve your chest pain  · Antiplatelets , such as aspirin, help prevent blood clots   Take your antiplatelet medicine exactly as directed  These medicines make it more likely for you to bleed or bruise  If you are told to take aspirin, do not take acetaminophen or ibuprofen instead  · Blood thinners  keep clots from forming in your blood  Clots may cause heart attacks, strokes, or death  This medicine makes it more likely for you to bleed or bruise  · Do not take certain medicines without asking your healthcare provider first   These include NSAIDs, herbal or vitamin supplements, or hormones (estrogen or progestin)  Procedures used to treat CAD:   · Angioplasty  may be done to open an artery blocked by plaque  A tube with a balloon on the end is threaded into the blocked artery  Once the tube is in the artery, the balloon is inflated  As the balloon inflates, it presses the plaque against the artery wall to open the artery  A stent may be placed in your artery to keep it open  · Coronary artery bypass graft surgery (CABG)  is open heart surgery  Healthcare providers take arteries or veins from other areas in your body and use them to bypass or go around the blocked arteries of your heart  Cardiac rehabilitation:  Your healthcare provider may recommend that you attend cardiac rehabilitation (rehab)  This is a program run by specialists who will help you safely strengthen your heart and reduce the risk for more heart disease  The plan includes exercise, relaxation, stress management, and heart-healthy nutrition  Healthcare providers will also check to make sure any medicines you are taking are working  Manage CAD to prevent a heart attack:   · Do not smoke  Nicotine and other chemicals in cigarettes and cigars can cause heart and lung damage  Ask your healthcare provider for information if you currently smoke and need help to quit  E-cigarettes or smokeless tobacco still contain nicotine  Talk to your healthcare provider before you use these products  · Exercise regularly    Exercise at least 30 minutes each day, on most days of the week  Exercise helps to lower high cholesterol and high blood pressure  It can also help you maintain a healthy weight  Ask your healthcare provider about the kind of exercise you should do and how to get started  · Maintain a healthy weight  If you are overweight, talk to your healthcare provider about how to lose weight  A weight loss of 10% can improve your heart health  · Eat heart-healthy foods  Include fresh fruits and vegetables in your meal plan  Choose low-fat foods, such as skim or 1% fat milk, low-fat cheese and yogurt, fish, chicken (without skin), and lean meats  Eat two 4-ounce servings of fish high in omega-3 fats each week, such as salmon, fresh tuna, and herring  Do not eat foods that are high in sodium, such as canned foods, potato chips, salty snacks, and cold cuts  Put less table salt on your food  · Limit or do not drink alcohol  A drink of alcohol is 12 ounces of beer, 5 ounces of wine, or 1½ ounces of liquor  · Manage other health conditions  Follow your healthcare provider's advice on how to manage other conditions that can affect your heart health  These include diabetes, high blood pressure, and high cholesterol  You may need to take medicines for these conditions and make other lifestyle changes  · Ask if you should have a flu vaccine  The flu can be dangerous for a person who has CAD  The flu vaccine is available every year in the fall  Follow up with your healthcare provider as directed: You may need to return for other tests  You may also be referred to a cardiac surgeon  Write down your questions so you remember to ask them during your visits  © 2017 [address] [employer]  Information is for End User's use only and may not be sold, redistributed or otherwise used for commercial purposes  All illustrations and images included in CareNotes® are the copyrighted property of [employer]  or Patrice Yin    The above information is an  only  It is not intended as medical advice for individual conditions or treatments  Talk to your doctor, nurse or pharmacist before following any medical regimen to see if it is safe and effective for you

## 2019-12-02 NOTE — PROGRESS NOTES
[facility] remote check ICD  A-fib on xarelto  6 NS-VT longest 4 seconds  Normal battery function      Current Outpatient Medications:     atorvastatin (LIPITOR) 40 mg tablet, Take 1 tablet (40 mg total) by mouth daily, Disp: 90 tablet, Rfl: 3    clopidogrel (PLAVIX) 75 mg tablet, Take 75 mg by mouth daily, Disp: , Rfl:     enalapril (VASOTEC) 5 mg tablet, Take 1 tablet (5 mg total) by mouth daily, Disp: 90 tablet, Rfl: 3    furosemide (LASIX) 20 mg tablet, Take 1 tablet (20 mg total) by mouth daily, Disp: 90 tablet, Rfl: 3    isosorbide mononitrate (IMDUR) 30 mg 24 hr tablet, Take 1 tablet (30 mg total) by mouth 2 (two) times a day, Disp: 180 tablet, Rfl: 3    metoprolol succinate (TOPROL-XL) 100 mg 24 hr tablet, Take 1 tablet (100 mg total) by mouth daily, Disp: 90 tablet, Rfl: 3    metoprolol succinate (TOPROL-XL) 25 mg 24 hr tablet, Take 1 tablet (25 mg total) by mouth daily Take in PM, Disp: 90 tablet, Rfl: 3    mexiletine (MEXITIL) 150 mg capsule, Take 150 mg by mouth One twice a day, Disp: , Rfl:     nitroglycerin (NITROSTAT) 0 4 mg SL tablet, Place 1 tablet (0 4 mg total) under the tongue every 5 (five) minutes as needed for chest pain At the first sign of attack; take as directed, Disp: 25 tablet, Rfl: 5    potassium chloride (K-DUR,KLOR-CON) 20 mEq tablet, Take 1 tablet (20 mEq total) by mouth daily (Patient not taking: Reported on 11/25/2019), Disp: 30 tablet, Rfl: 0    rivaroxaban (XARELTO) 20 mg tablet, Take 1 tablet (20 mg total) by mouth daily, Disp: 90 tablet, Rfl: 3    spironolactone (ALDACTONE) 25 mg tablet, Take 1 tablet (25 mg total) by mouth daily (Patient taking differently: Take 12 5 mg by mouth daily ), Disp: 90 tablet, Rfl: 3

## 2019-12-10 PROBLEM — E66.3 OVER WEIGHT: Status: RESOLVED | Noted: 2018-08-10 | Resolved: 2019-01-01

## 2019-12-10 PROBLEM — Z00.00 MEDICARE ANNUAL WELLNESS VISIT, SUBSEQUENT: Status: ACTIVE | Noted: 2019-01-01

## 2019-12-10 NOTE — PROGRESS NOTES
Assessment and Plan:     Problem List Items Addressed This Visit        Cardiovascular and Mediastinum    Ischemic cardiomyopathy (Chronic)       Other    Medicare annual wellness visit, subsequent - Primary      Preventive health issues were discussed with patient, and age appropriate screening tests were ordered as noted in patient's After Visit Summary  Personalized health advice and appropriate referrals for health education or preventive services given if needed, as noted in patient's After Visit Summary  Dfirbillator followup thru cardiology - Dr webster  Pneumovax booster today  Flu shot UTD  Colon screen utd Encouraged smoking cessationBMI Counseling: Body mass index is 26 44 kg/m²  The BMI is above normal  Nutrition recommendations include moderation in carbohydrate intake  No pharmacotherapy was ordered  Tobacco Cessation Counseling: Tobacco cessation counseling was provided  The patient is sincerely urged to quit consumption of tobacco  He is not ready to quit tobacco  Medication options not discussed        History of Present Illness:     Patient presents for Medicare Annual Wellness visit    Patient Care Team:  Champ Hwang DO as PCP - General  Champ Hwang DO     Problem List:     Patient Active Problem List   Diagnosis    Syncope    Tobacco abuse    S/P CABG (coronary artery bypass graft)    [facility]VD [facility] vascular [facility] ([facility]    ICD (implantable cardioverter-defibrillator), single, in situ    Ischemic cardiomyopathy    Hypertension    Acute blood loss anemia    [facility]    Atherosclerosis of native artery of both lower extremities with intermittent claudication (HCC)    Gastrointestinal hemorrhage with melena    Neuropathy    PAF (paroxysmal atrial fibrillation) ([facility] )    Ischemic colitis ([facility] )    Shingles outbreak    Ventricular fibrillation ([facility] )    Vitamin D deficiency    VT (ventricular tachycardia) ([facility]    S/P colonoscopy    Mixed hyperlipidemia    CHF (congestive heart failure) (HCC)    Chronic ischemic heart disease    Aortic valve disorder    Coronary arteriosclerosis in native artery    Left carotid bruit    Coronary angioplasty status    Chronic atrial fibrillation    Occlusion and stenosis of unspecified carotid artery    Nicotine dependence, unspecified, uncomplicated    Dyslipidemia    Medicare annual wellness visit, subsequent      Past Medical and Surgical History:     Past Medical History:   Diagnosis Date    CAD (coronary artery disease)     Chronic atrial fibrillation     Coronary angioplasty status     Essential hypertension 3/20/2018    Heart disease     Hx of echocardiogram 03/11/2015    EF0 30 (72%) Mod LV systolic dysfunction  Mild concentric LVH  Trace tricuspid regurg w/o significant pulmo htn  Pacer wire traversing the RA/RV / ICE ejection; EF 0 25 (25%) severe segmental LV systolic dysfunction  Mild concentric LV hypertrophy  Mild MR  Mild pulm htn  8/25/17      Hx of myocardial perfusion scan 09/20/2016    Lexiscan MPI; EF0 24 (24%) prior basal inferior, posterobasal, basal septal myocardial infarctions  Small area of basal lateral wall ischemia by perfusion imaging   Hypertension     Ischemic cardiomyopathy 3/20/2018    Ischemic cardiomyopathy     Mixed hyperlipidemia     Neuropathy     Occlusion and stenosis of unspecified carotid artery     Peripheral arterial [facility] 3/20/2018    PVD (peripheral vascular disease) ([facility]     S/P CABG (coronary artery bypass graft) 3/20/2018    S/P implantation of automatic cardioverter/defibrillator (AICD) 3/20/2018    Tobacco abuse 03/20/2018    nicotine dependence    Ventricular tachycardia ([facility]     Vitamin D deficiency      Past Surgical History:   Procedure Laterality Date    CARDIAC CATHETERIZATION  1999    aterial uslwtwusjigstpd-rvzgk-2/29/1999  / EF0 35 (35%) Multivessel CAD no intervention with medical management recommended   Graft to distal LAD: occluded SVG  Graft to D1: occluded SVG  Graft to OM1:SVG 6/8/17  / Known triple vessel disease   Patent LIMA and SVG to OM 3/30/18    CARDIAC DEFIBRILLATOR PLACEMENT  2009    implantable fepoitxbgpom-oitvwvpugzdma-tzubs-April 2009    CORONARY ARTERY BYPASS GRAFT  1999    LIMA-LAD, SVG-Diag, SVG-OM, SVG-Circ PL    OTHER SURGICAL HISTORY      cath stent placement      Family History:     Family History   Problem Relation Age of Onset    Colon polyps Brother         polyps of the sigmoid colon    Coronary artery disease Family         Less than 61 yrs of age      Social History:     Social History     Socioeconomic History    Marital status: /Civil Union     Spouse name: None    Number of children: None    Years of education: None    Highest education level: None   Occupational History    None   Social Needs    Financial resource strain: None    Food insecurity:     Worry: None     Inability: None    Transportation needs:     Medical: None     Non-medical: None   Tobacco Use    Smoking status: Current Every Day Smoker     Packs/day: 0 50     Types: Cigarettes    Smokeless tobacco: Never Used    Tobacco comment: Pt states he is trying to cut back   Substance and Sexual Activity    Alcohol use: Yes     Comment: social    Drug use: No    Sexual activity: None   Lifestyle    Physical activity:     Days per week: None     Minutes per session: None    Stress: None   Relationships    Social connections:     Talks on phone: None     Gets together: None     Attends Congregational service: None     Active member of club or organization: None     Attends meetings of clubs or organizations: None     Relationship status: None    Intimate partner violence:     Fear of current or ex partner: None     Emotionally abused: None     Physically abused: None     Forced sexual activity: None   Other Topics Concern    None   Social History Narrative    None       Medications and Allergies:     Current Outpatient Medications   Medication Sig Dispense Refill    atorvastatin (LIPITOR) 40 mg tablet Take 1 tablet (40 mg total) by mouth daily 90 tablet 3    clopidogrel (PLAVIX) 75 mg tablet Take 75 mg by mouth daily      enalapril (VASOTEC) 5 mg tablet Take 1 tablet (5 mg total) by mouth daily 90 tablet 3    furosemide (LASIX) 20 mg tablet Take 1 tablet (20 mg total) by mouth daily 90 tablet 3    isosorbide mononitrate (IMDUR) 30 mg 24 hr tablet Take 1 tablet (30 mg total) by mouth 2 (two) times a day 180 tablet 3    metoprolol succinate (TOPROL-XL) 100 mg 24 hr tablet Take 1 tablet (100 mg total) by mouth daily 90 tablet 3    metoprolol succinate (TOPROL-XL) 25 mg 24 hr tablet Take 1 tablet (25 mg total) by mouth daily Take in PM 90 tablet 3    mexiletine (MEXITIL) 150 mg capsule Take 150 mg by mouth One twice a day      nitroglycerin (NITROSTAT) 0 4 mg SL tablet Place 1 tablet (0 4 mg total) under the tongue every 5 (five) minutes as needed for chest pain At the first sign of attack; take as directed 25 tablet 5    rivaroxaban (XARELTO) 20 mg tablet Take 1 tablet (20 mg total) by mouth daily 90 tablet 3    spironolactone (ALDACTONE) 25 mg tablet Take 1 tablet (25 mg total) by mouth daily (Patient taking differently: Take 12 5 mg by mouth daily ) 90 tablet 3     No current facility-administered medications for this visit        Allergies   Allergen Reactions    Epinephrine     Nifedipine      Procardia    Penicillins       Immunizations:     Immunization History   Administered Date(s) Administered    Influenza TIV (IM) 11/01/2011    Influenza, high dose seasonal 0 5 mL 10/19/2018    Influenza, recombinant, quadrivalent,injectable, preservative free 10/15/2019    Pneumococcal Conjugate 13-Valent 10/19/2018      Health Maintenance:         Topic Date Due    CRC Screening: Colonoscopy  04/26/2028    Hepatitis C Screening  Completed         Topic Date Due    DTaP,Tdap,and Td Vaccines (1 - Tdap) 08/15/1961    Pneumococcal Vaccine: 65+ Years (2 of 2 - PPSV23) 10/19/2019      Medicare Health Risk Assessment:     /70   Pulse 76   Temp (!) 96 2 °F (35 7 °C) (Tympanic)   Ht 5' 10" (1 778 m)   Wt 83 6 kg (184 lb 4 oz)   SpO2 99%   BMI 26 44 kg/m²      Idania Perez is here for his Subsequent Wellness visit  Last Medicare Wellness visit information reviewed, patient interviewed and updates made to the record today  Health Risk Assessment:   Patient rates overall health as fair  Patient feels that their physical health rating is same  Eyesight was rated as same  Hearing was rated as same  Patient feels that their emotional and mental health rating is same  Pain experienced in the last 7 days has been none  Patient states that he has experienced no weight loss or gain in last 6 months  Depression Screening:   PHQ-2 Score: 0      Fall Risk Screening: In the past year, patient has experienced: no history of falling in past year      Home Safety:  Patient does not have trouble with stairs inside or outside of their home  Patient has working smoke alarms and has no working carbon monoxide detector  Home safety hazards include: none  Nutrition:   Current diet is Regular  Medications:   Patient is currently taking over-the-counter supplements  OTC medications include: see medication list  Patient is able to manage medications  Activities of Daily Living (ADLs)/Instrumental Activities of Daily Living (IADLs):   Walk and transfer into and out of bed and chair?: Yes  Dress and groom yourself?: Yes    Bathe or shower yourself?: Yes    Feed yourself? Yes  Do your laundry/housekeeping?: Yes  Manage your money, pay your bills and track your expenses?: Yes  Make your own meals?: Yes    Do your own shopping?: Yes    Previous Hospitalizations:   Any hospitalizations or ED visits within the last 12 months?: No      Advance Care Planning:   Living will: Yes    Durable POA for healthcare:  Yes    Advanced directive: Yes    End of Life Decisions reviewed with patient: Yes    Provider agrees with end of life decisions: Yes      PREVENTIVE SCREENINGS      Cardiovascular Screening:    General: Screening Not Indicated, History Lipid Disorder and Screening Current      Diabetes Screening:     General: Screening Current      Colorectal Cancer Screening:     General: Screening Current      Prostate Cancer Screening:    General: Screening Current      Osteoporosis Screening:    General: Screening Not Indicated      Abdominal Aortic Aneurysm (AAA) Screening:    Risk factors include: age between 73-67 yo and tobacco use        Hepatitis C Screening:    General: Screening Current    Other Counseling Topics:   Regular weightbearing exercise         May Johnson DO

## 2019-12-10 NOTE — PATIENT INSTRUCTIONS

## 2020-01-01 ENCOUNTER — APPOINTMENT (OUTPATIENT)
Dept: LAB | Facility: MEDICAL CENTER | Age: 70
End: 2020-01-01
Payer: MEDICARE

## 2020-01-01 ENCOUNTER — OFFICE VISIT (OUTPATIENT)
Dept: URGENT CARE | Facility: CLINIC | Age: 70
End: 2020-01-01
Payer: MEDICARE

## 2020-01-01 ENCOUNTER — REMOTE DEVICE CLINIC VISIT (OUTPATIENT)
Dept: CARDIOLOGY CLINIC | Facility: CLINIC | Age: 70
End: 2020-01-01
Payer: MEDICARE

## 2020-01-01 ENCOUNTER — HOSPITAL ENCOUNTER (EMERGENCY)
Facility: HOSPITAL | Age: 70
LOS: 1 days | End: 2020-12-02
Attending: EMERGENCY MEDICINE | Admitting: FAMILY MEDICINE
Payer: MEDICARE

## 2020-01-01 ENCOUNTER — HOSPITAL ENCOUNTER (INPATIENT)
Facility: HOSPITAL | Age: 70
LOS: 1 days | DRG: 208 | End: 2020-12-02
Attending: EMERGENCY MEDICINE | Admitting: EMERGENCY MEDICINE
Payer: MEDICARE

## 2020-01-01 ENCOUNTER — APPOINTMENT (INPATIENT)
Dept: RADIOLOGY | Facility: HOSPITAL | Age: 70
DRG: 208 | End: 2020-01-01
Payer: MEDICARE

## 2020-01-01 ENCOUNTER — APPOINTMENT (EMERGENCY)
Dept: RADIOLOGY | Facility: HOSPITAL | Age: 70
End: 2020-01-01
Payer: MEDICARE

## 2020-01-01 ENCOUNTER — OFFICE VISIT (OUTPATIENT)
Dept: CARDIOLOGY CLINIC | Facility: CLINIC | Age: 70
End: 2020-01-01
Payer: MEDICARE

## 2020-01-01 ENCOUNTER — TELEPHONE (OUTPATIENT)
Dept: INTERNAL MEDICINE CLINIC | Facility: CLINIC | Age: 70
End: 2020-01-01

## 2020-01-01 ENCOUNTER — APPOINTMENT (EMERGENCY)
Dept: CT IMAGING | Facility: HOSPITAL | Age: 70
End: 2020-01-01
Payer: MEDICARE

## 2020-01-01 VITALS
TEMPERATURE: 105.5 F | WEIGHT: 187.39 LBS | HEART RATE: 131 BPM | BODY MASS INDEX: 26.83 KG/M2 | DIASTOLIC BLOOD PRESSURE: 81 MMHG | SYSTOLIC BLOOD PRESSURE: 120 MMHG | RESPIRATION RATE: 18 BRPM | OXYGEN SATURATION: 94 % | HEIGHT: 70 IN

## 2020-01-01 VITALS
BODY MASS INDEX: 26.48 KG/M2 | SYSTOLIC BLOOD PRESSURE: 110 MMHG | WEIGHT: 185 LBS | HEART RATE: 59 BPM | DIASTOLIC BLOOD PRESSURE: 80 MMHG | HEIGHT: 70 IN

## 2020-01-01 VITALS — OXYGEN SATURATION: 100 % | TEMPERATURE: 100.8 F

## 2020-01-01 VITALS
WEIGHT: 185 LBS | DIASTOLIC BLOOD PRESSURE: 80 MMHG | HEIGHT: 70 IN | HEART RATE: 70 BPM | BODY MASS INDEX: 26.48 KG/M2 | SYSTOLIC BLOOD PRESSURE: 128 MMHG

## 2020-01-01 VITALS
SYSTOLIC BLOOD PRESSURE: 110 MMHG | WEIGHT: 187 LBS | DIASTOLIC BLOOD PRESSURE: 60 MMHG | BODY MASS INDEX: 26.77 KG/M2 | HEIGHT: 70 IN | HEART RATE: 72 BPM

## 2020-01-01 VITALS
HEIGHT: 70 IN | WEIGHT: 186 LBS | DIASTOLIC BLOOD PRESSURE: 72 MMHG | BODY MASS INDEX: 26.63 KG/M2 | HEART RATE: 60 BPM | SYSTOLIC BLOOD PRESSURE: 120 MMHG

## 2020-01-01 VITALS — HEART RATE: 84 BPM | RESPIRATION RATE: 16 BRPM | OXYGEN SATURATION: 99 % | TEMPERATURE: 98.1 F

## 2020-01-01 VITALS
WEIGHT: 183 LBS | BODY MASS INDEX: 26.2 KG/M2 | HEART RATE: 60 BPM | DIASTOLIC BLOOD PRESSURE: 80 MMHG | SYSTOLIC BLOOD PRESSURE: 118 MMHG | HEIGHT: 70 IN

## 2020-01-01 DIAGNOSIS — I25.10 CORONARY ARTERY DISEASE INVOLVING NATIVE CORONARY ARTERY OF NATIVE HEART WITHOUT ANGINA PECTORIS: ICD-10-CM

## 2020-01-01 DIAGNOSIS — I10 ESSENTIAL HYPERTENSION: Chronic | ICD-10-CM

## 2020-01-01 DIAGNOSIS — Z45.02 ENCOUNTER FOR IMPLANTABLE DEFIBRILLATOR REPROGRAMMING OR CHECK: ICD-10-CM

## 2020-01-01 DIAGNOSIS — I25.5 ISCHEMIC CARDIOMYOPATHY: ICD-10-CM

## 2020-01-01 DIAGNOSIS — I48.20 CHRONIC ATRIAL FIBRILLATION (HCC): ICD-10-CM

## 2020-01-01 DIAGNOSIS — I20.9 ANGINA PECTORIS (HCC): ICD-10-CM

## 2020-01-01 DIAGNOSIS — I48.20 CHRONIC A-FIB (HCC): ICD-10-CM

## 2020-01-01 DIAGNOSIS — I10 ESSENTIAL HYPERTENSION: Primary | ICD-10-CM

## 2020-01-01 DIAGNOSIS — Z95.1 S/P CABG (CORONARY ARTERY BYPASS GRAFT): Chronic | ICD-10-CM

## 2020-01-01 DIAGNOSIS — I25.5 ISCHEMIC CARDIOMYOPATHY: Chronic | ICD-10-CM

## 2020-01-01 DIAGNOSIS — I73.9 PVD (PERIPHERAL VASCULAR DISEASE) (HCC): Chronic | ICD-10-CM

## 2020-01-01 DIAGNOSIS — I48.91 ATRIAL FIBRILLATION, UNSPECIFIED TYPE (HCC): ICD-10-CM

## 2020-01-01 DIAGNOSIS — I25.810 CORONARY ARTERY DISEASE INVOLVING CORONARY BYPASS GRAFT OF NATIVE HEART WITHOUT ANGINA PECTORIS: ICD-10-CM

## 2020-01-01 DIAGNOSIS — E78.5 DYSLIPIDEMIA: ICD-10-CM

## 2020-01-01 DIAGNOSIS — N17.9 ACUTE KIDNEY INJURY (HCC): ICD-10-CM

## 2020-01-01 DIAGNOSIS — J18.9 PNEUMONIA: ICD-10-CM

## 2020-01-01 DIAGNOSIS — Z20.822 ENCOUNTER FOR SCREENING LABORATORY TESTING FOR COVID-19 VIRUS: Primary | ICD-10-CM

## 2020-01-01 DIAGNOSIS — I10 ESSENTIAL HYPERTENSION: Primary | Chronic | ICD-10-CM

## 2020-01-01 DIAGNOSIS — I25.5 ISCHEMIC CARDIOMYOPATHY: Primary | ICD-10-CM

## 2020-01-01 DIAGNOSIS — I65.23 STENOSIS OF BOTH INTERNAL CAROTID ARTERIES: ICD-10-CM

## 2020-01-01 DIAGNOSIS — I20.8 ANGINA OF EFFORT (HCC): Primary | ICD-10-CM

## 2020-01-01 DIAGNOSIS — I25.9 CHRONIC ISCHEMIC HEART DISEASE: ICD-10-CM

## 2020-01-01 DIAGNOSIS — A41.9 SEPSIS (HCC): ICD-10-CM

## 2020-01-01 DIAGNOSIS — I47.2 VT (VENTRICULAR TACHYCARDIA) (HCC): ICD-10-CM

## 2020-01-01 DIAGNOSIS — J32.9 SINUSITIS, UNSPECIFIED CHRONICITY, UNSPECIFIED LOCATION: Primary | ICD-10-CM

## 2020-01-01 DIAGNOSIS — I48.0 PAF (PAROXYSMAL ATRIAL FIBRILLATION) (HCC): ICD-10-CM

## 2020-01-01 DIAGNOSIS — Z95.810 ICD (IMPLANTABLE CARDIOVERTER-DEFIBRILLATOR), SINGLE, IN SITU: Chronic | ICD-10-CM

## 2020-01-01 DIAGNOSIS — Z20.822 EXPOSURE TO COVID-19 VIRUS: ICD-10-CM

## 2020-01-01 DIAGNOSIS — E78.2 MIXED HYPERLIPIDEMIA: ICD-10-CM

## 2020-01-01 DIAGNOSIS — Z72.0 TOBACCO ABUSE: Chronic | ICD-10-CM

## 2020-01-01 DIAGNOSIS — I10 HYPERTENSION, UNSPECIFIED TYPE: ICD-10-CM

## 2020-01-01 DIAGNOSIS — I48.19 PERSISTENT ATRIAL FIBRILLATION (HCC): ICD-10-CM

## 2020-01-01 DIAGNOSIS — Z98.61 CORONARY ANGIOPLASTY STATUS: ICD-10-CM

## 2020-01-01 DIAGNOSIS — R05.9 COUGH: Primary | ICD-10-CM

## 2020-01-01 DIAGNOSIS — Z95.810 ICD (IMPLANTABLE CARDIOVERTER-DEFIBRILLATOR), SINGLE, IN SITU: Primary | Chronic | ICD-10-CM

## 2020-01-01 DIAGNOSIS — Z01.818 PRE-OP EXAMINATION: ICD-10-CM

## 2020-01-01 DIAGNOSIS — R53.83 OTHER FATIGUE: Primary | ICD-10-CM

## 2020-01-01 DIAGNOSIS — I25.10 CORONARY ARTERY DISEASE INVOLVING NATIVE CORONARY ARTERY OF NATIVE HEART WITHOUT ANGINA PECTORIS: Primary | ICD-10-CM

## 2020-01-01 DIAGNOSIS — Z95.5 H/O HEART ARTERY STENT: Primary | ICD-10-CM

## 2020-01-01 DIAGNOSIS — R07.9 CHEST PAIN, UNSPECIFIED TYPE: Primary | ICD-10-CM

## 2020-01-01 DIAGNOSIS — I25.10 CORONARY ARTERIOSCLEROSIS IN NATIVE ARTERY: ICD-10-CM

## 2020-01-01 DIAGNOSIS — I50.22 CHRONIC SYSTOLIC CONGESTIVE HEART FAILURE (HCC): ICD-10-CM

## 2020-01-01 DIAGNOSIS — I70.213 ATHEROSCLEROSIS OF NATIVE ARTERY OF BOTH LOWER EXTREMITIES WITH INTERMITTENT CLAUDICATION (HCC): ICD-10-CM

## 2020-01-01 LAB
ABO GROUP BLD: NORMAL
ALBUMIN SERPL BCP-MCNC: 3.6 G/DL (ref 3.5–5)
ALP SERPL-CCNC: 55 U/L (ref 46–116)
ALT SERPL W P-5'-P-CCNC: 26 U/L (ref 12–78)
ALT SERPL W P-5'-P-CCNC: 33 U/L (ref 12–78)
ANION GAP SERPL CALCULATED.3IONS-SCNC: 12 MMOL/L (ref 4–13)
ANION GAP SERPL CALCULATED.3IONS-SCNC: 6 MMOL/L (ref 4–13)
ANION GAP SERPL CALCULATED.3IONS-SCNC: 6 MMOL/L (ref 4–13)
APTT PPP: 63 SECONDS (ref 23–37)
AST SERPL W P-5'-P-CCNC: 18 U/L (ref 5–45)
AST SERPL W P-5'-P-CCNC: 44 U/L (ref 5–45)
ATRIAL RATE: 159 BPM
BACTERIA UR QL AUTO: NORMAL /HPF
BASE EX.OXY STD BLDV CALC-SCNC: 38.5 % (ref 60–80)
BASE EXCESS BLDA CALC-SCNC: 20 MMOL/L (ref -2–3)
BASE EXCESS BLDV CALC-SCNC: -2.9 MMOL/L
BASOPHILS # BLD AUTO: 0.02 THOUSANDS/ΜL (ref 0–0.1)
BASOPHILS # BLD AUTO: 0.1 THOUSANDS/ΜL (ref 0–0.1)
BASOPHILS # BLD AUTO: 0.16 THOUSANDS/ΜL (ref 0–0.1)
BASOPHILS NFR BLD AUTO: 0 % (ref 0–1)
BASOPHILS NFR BLD AUTO: 1 % (ref 0–1)
BASOPHILS NFR BLD AUTO: 2 % (ref 0–1)
BILIRUB SERPL-MCNC: 0.7 MG/DL (ref 0.2–1)
BILIRUB UR QL STRIP: NEGATIVE
BLD GP AB SCN SERPL QL: NEGATIVE
BUN SERPL-MCNC: 22 MG/DL (ref 5–25)
BUN SERPL-MCNC: 27 MG/DL (ref 5–25)
BUN SERPL-MCNC: 31 MG/DL (ref 5–25)
CA-I BLD-SCNC: 1.8 MMOL/L (ref 1.12–1.32)
CALCIUM SERPL-MCNC: 9.1 MG/DL (ref 8.3–10.1)
CALCIUM SERPL-MCNC: 9.3 MG/DL (ref 8.3–10.1)
CALCIUM SERPL-MCNC: 9.4 MG/DL (ref 8.3–10.1)
CHLORIDE SERPL-SCNC: 106 MMOL/L (ref 100–108)
CHLORIDE SERPL-SCNC: 107 MMOL/L (ref 100–108)
CHLORIDE SERPL-SCNC: 95 MMOL/L (ref 100–108)
CK MB SERPL-MCNC: 0.6 NG/ML (ref 0–5)
CK MB SERPL-MCNC: <1 % (ref 0–2.5)
CK SERPL-CCNC: 402 U/L (ref 39–308)
CLARITY UR: CLEAR
CO2 SERPL-SCNC: 24 MMOL/L (ref 21–32)
CO2 SERPL-SCNC: 24 MMOL/L (ref 21–32)
CO2 SERPL-SCNC: 25 MMOL/L (ref 21–32)
COLOR UR: YELLOW
CREAT SERPL-MCNC: 1.29 MG/DL (ref 0.6–1.3)
CREAT SERPL-MCNC: 1.41 MG/DL (ref 0.6–1.3)
CREAT SERPL-MCNC: 2.15 MG/DL (ref 0.6–1.3)
EOSINOPHIL # BLD AUTO: 0.06 THOUSAND/ΜL (ref 0–0.61)
EOSINOPHIL # BLD AUTO: 0.5 THOUSAND/ΜL (ref 0–0.61)
EOSINOPHIL # BLD AUTO: 0.53 THOUSAND/ΜL (ref 0–0.61)
EOSINOPHIL NFR BLD AUTO: 1 % (ref 0–6)
EOSINOPHIL NFR BLD AUTO: 5 % (ref 0–6)
EOSINOPHIL NFR BLD AUTO: 5 % (ref 0–6)
ERYTHROCYTE [DISTWIDTH] IN BLOOD BY AUTOMATED COUNT: 14.3 % (ref 11.6–15.1)
ERYTHROCYTE [DISTWIDTH] IN BLOOD BY AUTOMATED COUNT: 14.7 % (ref 11.6–15.1)
ERYTHROCYTE [DISTWIDTH] IN BLOOD BY AUTOMATED COUNT: 15 % (ref 11.6–15.1)
FIO2 GAS DIL.REBREATH: 100 L
GFR SERPL CREATININE-BSD FRML MDRD: 30 ML/MIN/1.73SQ M
GFR SERPL CREATININE-BSD FRML MDRD: 50 ML/MIN/1.73SQ M
GFR SERPL CREATININE-BSD FRML MDRD: 56 ML/MIN/1.73SQ M
GLUCOSE P FAST SERPL-MCNC: 76 MG/DL (ref 65–99)
GLUCOSE SERPL-MCNC: 108 MG/DL (ref 65–140)
GLUCOSE SERPL-MCNC: 133 MG/DL (ref 65–140)
GLUCOSE SERPL-MCNC: 134 MG/DL (ref 65–140)
GLUCOSE UR STRIP-MCNC: NEGATIVE MG/DL
HBV SURFACE AG SER QL: NORMAL
HCO3 BLDA-SCNC: 52 MMOL/L (ref 22–28)
HCO3 BLDV-SCNC: 22.3 MMOL/L (ref 24–30)
HCT VFR BLD AUTO: 48.2 % (ref 36.5–49.3)
HCT VFR BLD AUTO: 49.8 % (ref 36.5–49.3)
HCT VFR BLD AUTO: 50.7 % (ref 36.5–49.3)
HCT VFR BLD CALC: 43 % (ref 36.5–49.3)
HCV AB SER QL: NORMAL
HGB BLD-MCNC: 15.7 G/DL (ref 12–17)
HGB BLD-MCNC: 16.4 G/DL (ref 12–17)
HGB BLD-MCNC: 16.9 G/DL (ref 12–17)
HGB BLDA-MCNC: 14.6 G/DL (ref 12–17)
HGB UR QL STRIP.AUTO: ABNORMAL
HIV 1+2 AB+HIV1 P24 AG SERPL QL IA: NORMAL
HIV1 P24 AG SER QL: NORMAL
IMM GRANULOCYTES # BLD AUTO: 0.04 THOUSAND/UL (ref 0–0.2)
IMM GRANULOCYTES # BLD AUTO: 0.04 THOUSAND/UL (ref 0–0.2)
IMM GRANULOCYTES # BLD AUTO: 0.06 THOUSAND/UL (ref 0–0.2)
IMM GRANULOCYTES NFR BLD AUTO: 0 % (ref 0–2)
IMM GRANULOCYTES NFR BLD AUTO: 0 % (ref 0–2)
IMM GRANULOCYTES NFR BLD AUTO: 1 % (ref 0–2)
INR PPP: 3.9 (ref 0.84–1.19)
KETONES UR STRIP-MCNC: NEGATIVE MG/DL
LACTATE SERPL-SCNC: 3.3 MMOL/L (ref 0.5–2)
LACTATE SERPL-SCNC: 3.4 MMOL/L (ref 0.5–2)
LDLC SERPL DIRECT ASSAY-MCNC: 84 MG/DL (ref 0–100)
LEUKOCYTE ESTERASE UR QL STRIP: NEGATIVE
LIPASE SERPL-CCNC: 83 U/L (ref 73–393)
LYMPHOCYTES # BLD AUTO: 0.71 THOUSANDS/ΜL (ref 0.6–4.47)
LYMPHOCYTES # BLD AUTO: 2.29 THOUSANDS/ΜL (ref 0.6–4.47)
LYMPHOCYTES # BLD AUTO: 2.84 THOUSANDS/ΜL (ref 0.6–4.47)
LYMPHOCYTES NFR BLD AUTO: 23 % (ref 14–44)
LYMPHOCYTES NFR BLD AUTO: 28 % (ref 14–44)
LYMPHOCYTES NFR BLD AUTO: 8 % (ref 14–44)
MAGNESIUM SERPL-MCNC: 1.7 MG/DL (ref 1.6–2.6)
MCH RBC QN AUTO: 31.8 PG (ref 26.8–34.3)
MCH RBC QN AUTO: 32.4 PG (ref 26.8–34.3)
MCH RBC QN AUTO: 32.8 PG (ref 26.8–34.3)
MCHC RBC AUTO-ENTMCNC: 32.6 G/DL (ref 31.4–37.4)
MCHC RBC AUTO-ENTMCNC: 32.9 G/DL (ref 31.4–37.4)
MCHC RBC AUTO-ENTMCNC: 33.3 G/DL (ref 31.4–37.4)
MCV RBC AUTO: 98 FL (ref 82–98)
MONOCYTES # BLD AUTO: 0.44 THOUSAND/ΜL (ref 0.17–1.22)
MONOCYTES # BLD AUTO: 1.27 THOUSAND/ΜL (ref 0.17–1.22)
MONOCYTES # BLD AUTO: 1.56 THOUSAND/ΜL (ref 0.17–1.22)
MONOCYTES NFR BLD AUTO: 13 % (ref 4–12)
MONOCYTES NFR BLD AUTO: 16 % (ref 4–12)
MONOCYTES NFR BLD AUTO: 5 % (ref 4–12)
NEUTROPHILS # BLD AUTO: 5.22 THOUSANDS/ΜL (ref 1.85–7.62)
NEUTROPHILS # BLD AUTO: 5.34 THOUSANDS/ΜL (ref 1.85–7.62)
NEUTROPHILS # BLD AUTO: 7.84 THOUSANDS/ΜL (ref 1.85–7.62)
NEUTS SEG NFR BLD AUTO: 52 % (ref 43–75)
NEUTS SEG NFR BLD AUTO: 55 % (ref 43–75)
NEUTS SEG NFR BLD AUTO: 85 % (ref 43–75)
NITRITE UR QL STRIP: NEGATIVE
NON-SQ EPI CELLS URNS QL MICRO: NORMAL /HPF
NRBC BLD AUTO-RTO: 0 /100 WBCS
NT-PROBNP SERPL-MCNC: 2752 PG/ML
O2 CT BLDV-SCNC: 8.9 ML/DL
PCO2 BLD: 102.3 MM HG (ref 36–44)
PCO2 BLD: >45 MMOL/L (ref 21–32)
PCO2 BLDV: 40.3 MM HG (ref 42–50)
PH BLD: 7.31 [PH] (ref 7.35–7.45)
PH BLDV: 7.36 [PH] (ref 7.3–7.4)
PH UR STRIP.AUTO: 6 [PH]
PHOSPHATE SERPL-MCNC: 2.6 MG/DL (ref 2.3–4.1)
PLATELET # BLD AUTO: 215 THOUSANDS/UL (ref 149–390)
PLATELET # BLD AUTO: 307 THOUSANDS/UL (ref 149–390)
PLATELET # BLD AUTO: 317 THOUSANDS/UL (ref 149–390)
PMV BLD AUTO: 10.1 FL (ref 8.9–12.7)
PMV BLD AUTO: 9 FL (ref 8.9–12.7)
PMV BLD AUTO: 9.8 FL (ref 8.9–12.7)
PO2 BLD: 36 MM HG (ref 75–129)
PO2 BLDV: 23.2 MM HG (ref 35–45)
POTASSIUM BLD-SCNC: 4.3 MMOL/L (ref 3.5–5.3)
POTASSIUM SERPL-SCNC: 4 MMOL/L (ref 3.5–5.3)
POTASSIUM SERPL-SCNC: 4.1 MMOL/L (ref 3.5–5.3)
POTASSIUM SERPL-SCNC: 4.3 MMOL/L (ref 3.5–5.3)
PROT SERPL-MCNC: 7.4 G/DL (ref 6.4–8.2)
PROT UR STRIP-MCNC: ABNORMAL MG/DL
PROTHROMBIN TIME: 37.3 SECONDS (ref 11.6–14.5)
QRS AXIS: 86 DEGREES
QRSD INTERVAL: 118 MS
QT INTERVAL: 350 MS
QTC INTERVAL: 435 MS
RBC # BLD AUTO: 4.93 MILLION/UL (ref 3.88–5.62)
RBC # BLD AUTO: 5.06 MILLION/UL (ref 3.88–5.62)
RBC # BLD AUTO: 5.15 MILLION/UL (ref 3.88–5.62)
RBC #/AREA URNS AUTO: NORMAL /HPF
RH BLD: NEGATIVE
SAO2 % BLD FROM PO2: 58 % (ref 60–85)
SARS-COV-2 RNA RESP QL NAA+PROBE: POSITIVE
SARS-COV-2 RNA SPEC QL NAA+PROBE: NOT DETECTED
SODIUM BLD-SCNC: 155 MMOL/L (ref 136–145)
SODIUM SERPL-SCNC: 131 MMOL/L (ref 136–145)
SODIUM SERPL-SCNC: 137 MMOL/L (ref 136–145)
SODIUM SERPL-SCNC: 137 MMOL/L (ref 136–145)
SP GR UR STRIP.AUTO: 1.02 (ref 1–1.03)
SPECIMEN EXPIRATION DATE: NORMAL
SPECIMEN SOURCE: ABNORMAL
T WAVE AXIS: -82 DEGREES
TROPONIN I SERPL-MCNC: 0.12 NG/ML
TSH SERPL DL<=0.05 MIU/L-ACNC: 1.2 UIU/ML (ref 0.36–3.74)
TSH SERPL DL<=0.05 MIU/L-ACNC: 1.47 UIU/ML (ref 0.36–3.74)
UROBILINOGEN UR QL STRIP.AUTO: 0.2 E.U./DL
VENTRICULAR RATE: 93 BPM
WBC # BLD AUTO: 10.06 THOUSAND/UL (ref 4.31–10.16)
WBC # BLD AUTO: 9.13 THOUSAND/UL (ref 4.31–10.16)
WBC # BLD AUTO: 9.83 THOUSAND/UL (ref 4.31–10.16)
WBC #/AREA URNS AUTO: NORMAL /HPF

## 2020-01-01 PROCEDURE — 94760 N-INVAS EAR/PLS OXIMETRY 1: CPT

## 2020-01-01 PROCEDURE — 3074F SYST BP LT 130 MM HG: CPT | Performed by: INTERNAL MEDICINE

## 2020-01-01 PROCEDURE — 36556 INSERT NON-TUNNEL CV CATH: CPT | Performed by: EMERGENCY MEDICINE

## 2020-01-01 PROCEDURE — 4040F PNEUMOC VAC/ADMIN/RCVD: CPT | Performed by: INTERNAL MEDICINE

## 2020-01-01 PROCEDURE — 36415 COLL VENOUS BLD VENIPUNCTURE: CPT | Performed by: INTERNAL MEDICINE

## 2020-01-01 PROCEDURE — 83735 ASSAY OF MAGNESIUM: CPT | Performed by: EMERGENCY MEDICINE

## 2020-01-01 PROCEDURE — 86900 BLOOD TYPING SEROLOGIC ABO: CPT | Performed by: EMERGENCY MEDICINE

## 2020-01-01 PROCEDURE — 99285 EMERGENCY DEPT VISIT HI MDM: CPT | Performed by: EMERGENCY MEDICINE

## 2020-01-01 PROCEDURE — 96365 THER/PROPH/DIAG IV INF INIT: CPT

## 2020-01-01 PROCEDURE — 3079F DIAST BP 80-89 MM HG: CPT | Performed by: INTERNAL MEDICINE

## 2020-01-01 PROCEDURE — 99291 CRITICAL CARE FIRST HOUR: CPT

## 2020-01-01 PROCEDURE — 84443 ASSAY THYROID STIM HORMONE: CPT | Performed by: EMERGENCY MEDICINE

## 2020-01-01 PROCEDURE — 84484 ASSAY OF TROPONIN QUANT: CPT | Performed by: EMERGENCY MEDICINE

## 2020-01-01 PROCEDURE — 86803 HEPATITIS C AB TEST: CPT | Performed by: PHYSICIAN ASSISTANT

## 2020-01-01 PROCEDURE — 4004F PT TOBACCO SCREEN RCVD TLK: CPT | Performed by: INTERNAL MEDICINE

## 2020-01-01 PROCEDURE — 82805 BLOOD GASES W/O2 SATURATION: CPT | Performed by: EMERGENCY MEDICINE

## 2020-01-01 PROCEDURE — 93296 REM INTERROG EVL PM/IDS: CPT | Performed by: INTERNAL MEDICINE

## 2020-01-01 PROCEDURE — 36415 COLL VENOUS BLD VENIPUNCTURE: CPT

## 2020-01-01 PROCEDURE — 36600 WITHDRAWAL OF ARTERIAL BLOOD: CPT

## 2020-01-01 PROCEDURE — 80048 BASIC METABOLIC PNL TOTAL CA: CPT

## 2020-01-01 PROCEDURE — 93000 ELECTROCARDIOGRAM COMPLETE: CPT | Performed by: INTERNAL MEDICINE

## 2020-01-01 PROCEDURE — 82553 CREATINE MB FRACTION: CPT | Performed by: EMERGENCY MEDICINE

## 2020-01-01 PROCEDURE — 80053 COMPREHEN METABOLIC PANEL: CPT | Performed by: EMERGENCY MEDICINE

## 2020-01-01 PROCEDURE — 5A1935Z RESPIRATORY VENTILATION, LESS THAN 24 CONSECUTIVE HOURS: ICD-10-PCS | Performed by: INTERNAL MEDICINE

## 2020-01-01 PROCEDURE — NC001 PR NO CHARGE: Performed by: INTERNAL MEDICINE

## 2020-01-01 PROCEDURE — 85025 COMPLETE CBC W/AUTO DIFF WBC: CPT

## 2020-01-01 PROCEDURE — 36415 COLL VENOUS BLD VENIPUNCTURE: CPT | Performed by: EMERGENCY MEDICINE

## 2020-01-01 PROCEDURE — 87635 SARS-COV-2 COVID-19 AMP PRB: CPT | Performed by: EMERGENCY MEDICINE

## 2020-01-01 PROCEDURE — 96361 HYDRATE IV INFUSION ADD-ON: CPT

## 2020-01-01 PROCEDURE — 92950 HEART/LUNG RESUSCITATION CPR: CPT | Performed by: EMERGENCY MEDICINE

## 2020-01-01 PROCEDURE — 83690 ASSAY OF LIPASE: CPT | Performed by: EMERGENCY MEDICINE

## 2020-01-01 PROCEDURE — 99214 OFFICE O/P EST MOD 30 MIN: CPT | Performed by: INTERNAL MEDICINE

## 2020-01-01 PROCEDURE — 93005 ELECTROCARDIOGRAM TRACING: CPT

## 2020-01-01 PROCEDURE — 3008F BODY MASS INDEX DOCD: CPT | Performed by: INTERNAL MEDICINE

## 2020-01-01 PROCEDURE — 85610 PROTHROMBIN TIME: CPT | Performed by: EMERGENCY MEDICINE

## 2020-01-01 PROCEDURE — 74176 CT ABD & PELVIS W/O CONTRAST: CPT

## 2020-01-01 PROCEDURE — 82947 ASSAY GLUCOSE BLOOD QUANT: CPT

## 2020-01-01 PROCEDURE — 1160F RVW MEDS BY RX/DR IN RCRD: CPT | Performed by: INTERNAL MEDICINE

## 2020-01-01 PROCEDURE — 96368 THER/DIAG CONCURRENT INF: CPT

## 2020-01-01 PROCEDURE — 84295 ASSAY OF SERUM SODIUM: CPT

## 2020-01-01 PROCEDURE — 82803 BLOOD GASES ANY COMBINATION: CPT

## 2020-01-01 PROCEDURE — 84132 ASSAY OF SERUM POTASSIUM: CPT

## 2020-01-01 PROCEDURE — 94770 HB EXHALED CARBON DIOXIDE TEST: CPT

## 2020-01-01 PROCEDURE — 80048 BASIC METABOLIC PNL TOTAL CA: CPT | Performed by: INTERNAL MEDICINE

## 2020-01-01 PROCEDURE — 31500 INSERT EMERGENCY AIRWAY: CPT | Performed by: EMERGENCY MEDICINE

## 2020-01-01 PROCEDURE — 82330 ASSAY OF CALCIUM: CPT

## 2020-01-01 PROCEDURE — 96366 THER/PROPH/DIAG IV INF ADDON: CPT

## 2020-01-01 PROCEDURE — 85730 THROMBOPLASTIN TIME PARTIAL: CPT | Performed by: EMERGENCY MEDICINE

## 2020-01-01 PROCEDURE — 31500 INSERT EMERGENCY AIRWAY: CPT

## 2020-01-01 PROCEDURE — 83605 ASSAY OF LACTIC ACID: CPT | Performed by: EMERGENCY MEDICINE

## 2020-01-01 PROCEDURE — 99291 CRITICAL CARE FIRST HOUR: CPT | Performed by: EMERGENCY MEDICINE

## 2020-01-01 PROCEDURE — 71250 CT THORAX DX C-: CPT

## 2020-01-01 PROCEDURE — 83880 ASSAY OF NATRIURETIC PEPTIDE: CPT | Performed by: EMERGENCY MEDICINE

## 2020-01-01 PROCEDURE — 83721 ASSAY OF BLOOD LIPOPROTEIN: CPT

## 2020-01-01 PROCEDURE — 85014 HEMATOCRIT: CPT

## 2020-01-01 PROCEDURE — 93010 ELECTROCARDIOGRAM REPORT: CPT | Performed by: INTERNAL MEDICINE

## 2020-01-01 PROCEDURE — 84450 TRANSFERASE (AST) (SGOT): CPT

## 2020-01-01 PROCEDURE — 92950 HEART/LUNG RESUSCITATION CPR: CPT

## 2020-01-01 PROCEDURE — 93295 DEV INTERROG REMOTE 1/2/MLT: CPT | Performed by: INTERNAL MEDICINE

## 2020-01-01 PROCEDURE — 81001 URINALYSIS AUTO W/SCOPE: CPT | Performed by: EMERGENCY MEDICINE

## 2020-01-01 PROCEDURE — 82550 ASSAY OF CK (CPK): CPT | Performed by: EMERGENCY MEDICINE

## 2020-01-01 PROCEDURE — NC001 PR NO CHARGE: Performed by: EMERGENCY MEDICINE

## 2020-01-01 PROCEDURE — 84100 ASSAY OF PHOSPHORUS: CPT | Performed by: EMERGENCY MEDICINE

## 2020-01-01 PROCEDURE — 84460 ALANINE AMINO (ALT) (SGPT): CPT

## 2020-01-01 PROCEDURE — U0003 INFECTIOUS AGENT DETECTION BY NUCLEIC ACID (DNA OR RNA); SEVERE ACUTE RESPIRATORY SYNDROME CORONAVIRUS 2 (SARS-COV-2) (CORONAVIRUS DISEASE [COVID-19]), AMPLIFIED PROBE TECHNIQUE, MAKING USE OF HIGH THROUGHPUT TECHNOLOGIES AS DESCRIBED BY CMS-2020-01-R: HCPCS | Performed by: PHYSICIAN ASSISTANT

## 2020-01-01 PROCEDURE — 96375 TX/PRO/DX INJ NEW DRUG ADDON: CPT

## 2020-01-01 PROCEDURE — 85025 COMPLETE CBC W/AUTO DIFF WBC: CPT | Performed by: INTERNAL MEDICINE

## 2020-01-01 PROCEDURE — 84443 ASSAY THYROID STIM HORMONE: CPT | Performed by: INTERNAL MEDICINE

## 2020-01-01 PROCEDURE — 87340 HEPATITIS B SURFACE AG IA: CPT | Performed by: PHYSICIAN ASSISTANT

## 2020-01-01 PROCEDURE — 87040 BLOOD CULTURE FOR BACTERIA: CPT | Performed by: EMERGENCY MEDICINE

## 2020-01-01 PROCEDURE — 87806 HIV AG W/HIV1&2 ANTB W/OPTIC: CPT | Performed by: PHYSICIAN ASSISTANT

## 2020-01-01 PROCEDURE — 86850 RBC ANTIBODY SCREEN: CPT | Performed by: EMERGENCY MEDICINE

## 2020-01-01 PROCEDURE — 99213 OFFICE O/P EST LOW 20 MIN: CPT | Performed by: PHYSICIAN ASSISTANT

## 2020-01-01 PROCEDURE — 86901 BLOOD TYPING SEROLOGIC RH(D): CPT | Performed by: EMERGENCY MEDICINE

## 2020-01-01 PROCEDURE — G0463 HOSPITAL OUTPT CLINIC VISIT: HCPCS | Performed by: PHYSICIAN ASSISTANT

## 2020-01-01 PROCEDURE — 85025 COMPLETE CBC W/AUTO DIFF WBC: CPT | Performed by: EMERGENCY MEDICINE

## 2020-01-01 RX ORDER — METOPROLOL SUCCINATE 100 MG/1
100 TABLET, EXTENDED RELEASE ORAL DAILY
Qty: 90 TABLET | Refills: 3 | Status: SHIPPED | OUTPATIENT
Start: 2020-01-01 | End: 2020-01-01 | Stop reason: HOSPADM

## 2020-01-01 RX ORDER — METOPROLOL SUCCINATE 25 MG/1
25 TABLET, EXTENDED RELEASE ORAL DAILY
Qty: 90 TABLET | Refills: 3 | Status: SHIPPED | OUTPATIENT
Start: 2020-01-01 | End: 2020-01-01 | Stop reason: HOSPADM

## 2020-01-01 RX ORDER — NITROGLYCERIN 0.4 MG/1
0.4 TABLET SUBLINGUAL
Qty: 25 TABLET | Refills: 5 | Status: SHIPPED | OUTPATIENT
Start: 2020-01-01 | End: 2020-01-01 | Stop reason: HOSPADM

## 2020-01-01 RX ORDER — ATORVASTATIN CALCIUM 40 MG/1
80 TABLET, FILM COATED ORAL
Status: DISCONTINUED | OUTPATIENT
Start: 2020-01-01 | End: 2020-01-01

## 2020-01-01 RX ORDER — SPIRONOLACTONE 25 MG/1
TABLET ORAL
Qty: 45 TABLET | Refills: 3 | Status: SHIPPED | OUTPATIENT
Start: 2020-01-01 | End: 2020-01-01 | Stop reason: HOSPADM

## 2020-01-01 RX ORDER — ONDANSETRON 2 MG/ML
4 INJECTION INTRAMUSCULAR; INTRAVENOUS ONCE
Status: COMPLETED | OUTPATIENT
Start: 2020-01-01 | End: 2020-01-01

## 2020-01-01 RX ORDER — FUROSEMIDE 20 MG/1
TABLET ORAL
Qty: 90 TABLET | Refills: 0 | Status: SHIPPED | OUTPATIENT
Start: 2020-01-01 | End: 2020-01-01

## 2020-01-01 RX ORDER — SPIRONOLACTONE 25 MG/1
12.5 TABLET ORAL DAILY
Qty: 45 TABLET | Refills: 3 | Status: SHIPPED | OUTPATIENT
Start: 2020-01-01 | End: 2020-01-01

## 2020-01-01 RX ORDER — MELATONIN
2000 DAILY
Status: DISCONTINUED | OUTPATIENT
Start: 2020-01-01 | End: 2020-01-01

## 2020-01-01 RX ORDER — FENTANYL CITRATE 50 UG/ML
50 INJECTION, SOLUTION INTRAMUSCULAR; INTRAVENOUS ONCE
Status: DISCONTINUED | OUTPATIENT
Start: 2020-01-01 | End: 2020-01-01 | Stop reason: HOSPADM

## 2020-01-01 RX ORDER — AMIODARONE HYDROCHLORIDE 50 MG/ML
INJECTION, SOLUTION INTRAVENOUS
Status: DISCONTINUED
Start: 2020-01-01 | End: 2020-01-01 | Stop reason: HOSPADM

## 2020-01-01 RX ORDER — ZINC SULFATE 50(220)MG
220 CAPSULE ORAL DAILY
Status: DISCONTINUED | OUTPATIENT
Start: 2020-01-01 | End: 2020-01-01

## 2020-01-01 RX ORDER — VECURONIUM BROMIDE 1 MG/ML
9 INJECTION, POWDER, LYOPHILIZED, FOR SOLUTION INTRAVENOUS ONCE
Status: DISCONTINUED | OUTPATIENT
Start: 2020-01-01 | End: 2020-01-01 | Stop reason: HOSPADM

## 2020-01-01 RX ORDER — SODIUM CHLORIDE 9 MG/ML
125 INJECTION, SOLUTION INTRAVENOUS CONTINUOUS
Status: DISCONTINUED | OUTPATIENT
Start: 2020-01-01 | End: 2020-01-01 | Stop reason: HOSPADM

## 2020-01-01 RX ORDER — FENTANYL CITRATE 50 UG/ML
50 INJECTION, SOLUTION INTRAMUSCULAR; INTRAVENOUS
Status: DISCONTINUED | OUTPATIENT
Start: 2020-01-01 | End: 2020-01-01 | Stop reason: HOSPADM

## 2020-01-01 RX ORDER — EPINEPHRINE 1 MG/ML
INJECTION, SOLUTION, CONCENTRATE INTRAVENOUS
Status: DISCONTINUED
Start: 2020-01-01 | End: 2020-01-01 | Stop reason: HOSPADM

## 2020-01-01 RX ORDER — DOXYCYCLINE HYCLATE 100 MG/1
100 CAPSULE ORAL EVERY 12 HOURS
Status: DISCONTINUED | OUTPATIENT
Start: 2020-01-01 | End: 2020-01-01

## 2020-01-01 RX ORDER — HEPARIN SODIUM 5000 [USP'U]/ML
5000 INJECTION, SOLUTION INTRAVENOUS; SUBCUTANEOUS EVERY 8 HOURS SCHEDULED
Status: DISCONTINUED | OUTPATIENT
Start: 2020-01-01 | End: 2020-01-01

## 2020-01-01 RX ORDER — PROPOFOL 10 MG/ML
5-50 INJECTION, EMULSION INTRAVENOUS
Status: DISCONTINUED | OUTPATIENT
Start: 2020-01-01 | End: 2020-01-01 | Stop reason: HOSPADM

## 2020-01-01 RX ORDER — MAGNESIUM SULFATE HEPTAHYDRATE 40 MG/ML
INJECTION, SOLUTION INTRAVENOUS
Status: DISCONTINUED
Start: 2020-01-01 | End: 2020-01-01 | Stop reason: HOSPADM

## 2020-01-01 RX ORDER — CLOPIDOGREL BISULFATE 75 MG/1
75 TABLET ORAL DAILY
Qty: 90 TABLET | Refills: 3 | Status: SHIPPED | OUTPATIENT
Start: 2020-01-01 | End: 2020-01-01

## 2020-01-01 RX ORDER — ISOSORBIDE MONONITRATE 30 MG/1
30 TABLET, EXTENDED RELEASE ORAL 2 TIMES DAILY
Qty: 180 TABLET | Refills: 3 | Status: SHIPPED | OUTPATIENT
Start: 2020-01-01 | End: 2020-01-01 | Stop reason: HOSPADM

## 2020-01-01 RX ORDER — FUROSEMIDE 20 MG/1
TABLET ORAL
Qty: 90 TABLET | Refills: 0 | Status: SHIPPED | OUTPATIENT
Start: 2020-01-01 | End: 2020-01-01 | Stop reason: SDUPTHER

## 2020-01-01 RX ORDER — ATORVASTATIN CALCIUM 80 MG/1
80 TABLET, FILM COATED ORAL DAILY
Qty: 90 TABLET | Refills: 3 | Status: SHIPPED | OUTPATIENT
Start: 2020-01-01 | End: 2020-01-01

## 2020-01-01 RX ORDER — ATORVASTATIN CALCIUM 40 MG/1
40 TABLET, FILM COATED ORAL
Status: DISCONTINUED | OUTPATIENT
Start: 2020-01-01 | End: 2020-01-01

## 2020-01-01 RX ORDER — MAGNESIUM SULFATE HEPTAHYDRATE 40 MG/ML
2 INJECTION, SOLUTION INTRAVENOUS ONCE
Status: COMPLETED | OUTPATIENT
Start: 2020-01-01 | End: 2020-01-01

## 2020-01-01 RX ORDER — RANOLAZINE 500 MG/1
500 TABLET, EXTENDED RELEASE ORAL 2 TIMES DAILY
Qty: 60 TABLET | Refills: 5 | Status: SHIPPED | OUTPATIENT
Start: 2020-01-01 | End: 2020-01-01 | Stop reason: ALTCHOICE

## 2020-01-01 RX ORDER — ETOMIDATE 2 MG/ML
20 INJECTION INTRAVENOUS ONCE
Status: DISCONTINUED | OUTPATIENT
Start: 2020-01-01 | End: 2020-01-01 | Stop reason: HOSPADM

## 2020-01-01 RX ORDER — CEFTRIAXONE SODIUM 2 G/50ML
2000 INJECTION, SOLUTION INTRAVENOUS ONCE
Status: COMPLETED | OUTPATIENT
Start: 2020-01-01 | End: 2020-01-01

## 2020-01-01 RX ORDER — NITROGLYCERIN 0.4 MG/1
0.4 TABLET SUBLINGUAL
Qty: 100 TABLET | Refills: 1 | Status: SHIPPED | OUTPATIENT
Start: 2020-01-01 | End: 2020-01-01 | Stop reason: HOSPADM

## 2020-01-01 RX ORDER — MULTIVIT-MIN/FERROUS GLUCONATE 9 MG/15 ML
15 LIQUID (ML) ORAL DAILY
Status: DISCONTINUED | OUTPATIENT
Start: 2020-12-09 | End: 2020-01-01

## 2020-01-01 RX ORDER — PROPOFOL 10 MG/ML
5 INJECTION, EMULSION INTRAVENOUS
Status: DISCONTINUED | OUTPATIENT
Start: 2020-01-01 | End: 2020-01-01 | Stop reason: HOSPADM

## 2020-01-01 RX ORDER — ENALAPRIL MALEATE 5 MG/1
5 TABLET ORAL DAILY
Qty: 90 TABLET | Refills: 3 | Status: SHIPPED | OUTPATIENT
Start: 2020-01-01 | End: 2020-01-01 | Stop reason: HOSPADM

## 2020-01-01 RX ORDER — VECURONIUM BROMIDE 1 MG/ML
INJECTION, POWDER, LYOPHILIZED, FOR SOLUTION INTRAVENOUS CODE/TRAUMA/SEDATION MEDICATION
Status: COMPLETED | OUTPATIENT
Start: 2020-01-01 | End: 2020-01-01

## 2020-01-01 RX ORDER — RIVAROXABAN 20 MG/1
TABLET, FILM COATED ORAL
Qty: 90 TABLET | Refills: 3 | Status: SHIPPED | OUTPATIENT
Start: 2020-01-01 | End: 2020-01-01 | Stop reason: HOSPADM

## 2020-01-01 RX ORDER — ATORVASTATIN CALCIUM 80 MG/1
80 TABLET, FILM COATED ORAL DAILY
Qty: 90 TABLET | Refills: 3 | Status: SHIPPED | OUTPATIENT
Start: 2020-01-01 | End: 2020-01-01 | Stop reason: HOSPADM

## 2020-01-01 RX ORDER — LORAZEPAM 2 MG/ML
1 INJECTION INTRAMUSCULAR ONCE
Status: COMPLETED | OUTPATIENT
Start: 2020-01-01 | End: 2020-01-01

## 2020-01-01 RX ORDER — CHLORHEXIDINE GLUCONATE 0.12 MG/ML
15 RINSE ORAL EVERY 12 HOURS SCHEDULED
Status: DISCONTINUED | OUTPATIENT
Start: 2020-01-01 | End: 2020-01-01

## 2020-01-01 RX ORDER — ACETAMINOPHEN 325 MG/1
650 TABLET ORAL ONCE
Status: COMPLETED | OUTPATIENT
Start: 2020-01-01 | End: 2020-01-01

## 2020-01-01 RX ORDER — GLYCOPYRROLATE 0.2 MG/ML
0.1 INJECTION INTRAMUSCULAR; INTRAVENOUS
Status: DISCONTINUED | OUTPATIENT
Start: 2020-01-01 | End: 2020-01-01 | Stop reason: HOSPADM

## 2020-01-01 RX ORDER — AZITHROMYCIN 250 MG/1
TABLET, FILM COATED ORAL
Qty: 6 TABLET | Refills: 0 | Status: SHIPPED | OUTPATIENT
Start: 2020-01-01 | End: 2020-01-01

## 2020-01-01 RX ORDER — ETOMIDATE 2 MG/ML
INJECTION INTRAVENOUS CODE/TRAUMA/SEDATION MEDICATION
Status: COMPLETED | OUTPATIENT
Start: 2020-01-01 | End: 2020-01-01

## 2020-01-01 RX ORDER — ASCORBIC ACID 500 MG
1000 TABLET ORAL EVERY 12 HOURS SCHEDULED
Status: DISCONTINUED | OUTPATIENT
Start: 2020-01-01 | End: 2020-01-01

## 2020-01-01 RX ORDER — FUROSEMIDE 20 MG/1
20 TABLET ORAL DAILY
Qty: 90 TABLET | Refills: 3 | Status: SHIPPED | OUTPATIENT
Start: 2020-01-01 | End: 2020-01-01 | Stop reason: HOSPADM

## 2020-01-01 RX ORDER — LEVALBUTEROL 1.25 MG/.5ML
1.25 SOLUTION, CONCENTRATE RESPIRATORY (INHALATION)
Status: DISCONTINUED | OUTPATIENT
Start: 2020-01-01 | End: 2020-01-01

## 2020-01-01 RX ORDER — CLOPIDOGREL BISULFATE 75 MG/1
TABLET ORAL
Qty: 90 TABLET | Refills: 3 | Status: SHIPPED | OUTPATIENT
Start: 2020-01-01 | End: 2020-01-01 | Stop reason: HOSPADM

## 2020-01-01 RX ORDER — ISOSORBIDE MONONITRATE 30 MG/1
TABLET, EXTENDED RELEASE ORAL
Qty: 180 TABLET | Refills: 3 | Status: SHIPPED | OUTPATIENT
Start: 2020-01-01 | End: 2020-01-01 | Stop reason: SDUPTHER

## 2020-01-01 RX ADMIN — SODIUM CHLORIDE, SODIUM LACTATE, POTASSIUM CHLORIDE, AND CALCIUM CHLORIDE 1000 ML: .6; .31; .03; .02 INJECTION, SOLUTION INTRAVENOUS at 08:24

## 2020-01-01 RX ADMIN — EPINEPHRINE 20 MCG/MIN: 1 INJECTION, SOLUTION, CONCENTRATE INTRAVENOUS at 14:57

## 2020-01-01 RX ADMIN — VASOPRESSIN 0.04 UNITS/MIN: 20 INJECTION INTRAVENOUS at 14:55

## 2020-01-01 RX ADMIN — MAGNESIUM SULFATE HEPTAHYDRATE 2 G: 40 INJECTION, SOLUTION INTRAVENOUS at 10:06

## 2020-01-01 RX ADMIN — ONDANSETRON 4 MG: 2 INJECTION INTRAMUSCULAR; INTRAVENOUS at 05:45

## 2020-01-01 RX ADMIN — LIDOCAINE HYDROCHLORIDE 100 MG: 20 INJECTION, SOLUTION INTRAVENOUS at 10:54

## 2020-01-01 RX ADMIN — LORAZEPAM 1 MG: 2 INJECTION INTRAMUSCULAR; INTRAVENOUS at 17:14

## 2020-01-01 RX ADMIN — AMIODARONE HYDROCHLORIDE 1 MG/MIN: 50 INJECTION, SOLUTION INTRAVENOUS at 10:27

## 2020-01-01 RX ADMIN — DEXTROSE 150 MG: 50 INJECTION, SOLUTION INTRAVENOUS at 10:17

## 2020-01-01 RX ADMIN — FENTANYL CITRATE 50 MCG: 50 INJECTION INTRAMUSCULAR; INTRAVENOUS at 17:15

## 2020-01-01 RX ADMIN — ETOMIDATE 20 MG: 2 INJECTION INTRAVENOUS at 11:10

## 2020-01-01 RX ADMIN — VECURONIUM BROMIDE 9 MG: 1 INJECTION, POWDER, LYOPHILIZED, FOR SOLUTION INTRAVENOUS at 11:10

## 2020-01-01 RX ADMIN — THIAMINE HYDROCHLORIDE 200 MG: 100 INJECTION, SOLUTION INTRAMUSCULAR; INTRAVENOUS at 08:53

## 2020-01-01 RX ADMIN — NOREPINEPHRINE BITARTRATE 20 MCG/MIN: 1 INJECTION, SOLUTION, CONCENTRATE INTRAVENOUS at 14:00

## 2020-01-01 RX ADMIN — NOREPINEPHRINE BITARTRATE 30 MCG/MIN: 1 INJECTION, SOLUTION, CONCENTRATE INTRAVENOUS at 16:45

## 2020-01-01 RX ADMIN — SODIUM CHLORIDE, SODIUM LACTATE, POTASSIUM CHLORIDE, AND CALCIUM CHLORIDE 1000 ML: .6; .31; .03; .02 INJECTION, SOLUTION INTRAVENOUS at 07:24

## 2020-01-01 RX ADMIN — PROPOFOL 5 MCG/KG/MIN: 10 INJECTION, EMULSION INTRAVENOUS at 11:23

## 2020-01-01 RX ADMIN — SODIUM CHLORIDE, SODIUM LACTATE, POTASSIUM CHLORIDE, AND CALCIUM CHLORIDE 1000 ML: .6; .31; .03; .02 INJECTION, SOLUTION INTRAVENOUS at 08:27

## 2020-01-01 RX ADMIN — CEFTRIAXONE SODIUM 2000 MG: 2 INJECTION, SOLUTION INTRAVENOUS at 09:30

## 2020-01-01 RX ADMIN — ACETAMINOPHEN 650 MG: 325 TABLET ORAL at 10:42

## 2020-01-01 RX ADMIN — SODIUM CHLORIDE 125 ML/HR: 0.9 INJECTION, SOLUTION INTRAVENOUS at 05:43

## 2020-01-08 NOTE — TELEPHONE ENCOUNTER
Patient wants new scripts sent to mail order  In addition, he reports symptoms of angina, and I made him an appointment to see Dr Siobhan Wade on 1/9/2020

## 2020-01-09 NOTE — PROGRESS NOTES
Subjective:        Patient ID: Bernadette Norris is a 71 y o  male  Chief Complaint:  Doc I think I have another Zahra Reyes has his angina back  He has taken 3 sublingual nitroglycerin as late as today  It does work to relieve his angina  It is mostly occurring in the morning upon awakening  He has intermittently with exertion but occasionally he can do a few things at home without angina  His rather typical     EKG stable, rate controlled AFib no alarming ST changes  Twelve 5 blood work reviewed, no anemia, normal renal function  05/2019 catheterization as below; Systemic arterial pressure is normotensive  Left ventricular end   diastolic pressure is moderately elevated  · 1st lesion: Prox Graft lesion is 95% stenosed  Following a successful   intervention there was an excellent angiographic appearance with residual   stenosis reduced to 0%  · There is severe three vessel CAD  · The left subclavian stent is patent  · The AJMAR to LAD is patent  The SVG to OM2 had a severe stenosis   successfully treated with a JED  · The SVG to the Diagonal and PDA are chronically occluded  Recommend:  Bernadette Norris will continue dual antiplatelet therapy for coronary stent   placement  he  will continue aggressive risk modification with diet and exercise  Lipid profile will be followed as needed  GDMT for heart failure discussed  Follow up with Dr Saumya Velez MD, Ph D ,FACC        The following portions of the patient's history were reviewed and updated as appropriate: allergies, current medications, past family history, past medical history, past social history, past surgical history and problem list   Review of Systems   Constitution: Negative for chills, diaphoresis, malaise/fatigue and weight gain  HENT: Negative for nosebleeds and stridor  Eyes: Negative for double vision, vision loss in left eye, vision loss in right eye and visual disturbance     Cardiovascular: Positive for chest pain  Negative for claudication, cyanosis, dyspnea on exertion, irregular heartbeat, leg swelling, near-syncope, orthopnea, palpitations, paroxysmal nocturnal dyspnea and syncope  Respiratory: Negative for cough, shortness of breath, snoring and wheezing  Endocrine: Negative for polydipsia, polyphagia and polyuria  Hematologic/Lymphatic: Negative for bleeding problem  Does not bruise/bleed easily  Skin: Negative for flushing and rash  Musculoskeletal: Negative for falls and myalgias  Gastrointestinal: Negative for abdominal pain, heartburn, hematemesis, hematochezia, melena and nausea  Genitourinary: Negative for hematuria  Neurological: Negative for brief paralysis, dizziness, focal weakness, headaches, light-headedness, loss of balance and vertigo  Psychiatric/Behavioral: Negative for altered mental status and substance abuse  Allergic/Immunologic: Negative for hives  Objective:      /60   Pulse 72   Ht 5' 10" (1 778 m)   Wt 84 8 kg (187 lb)   BMI 26 83 kg/m²   Physical Exam   Constitutional: He is oriented to person, place, and time  He appears well-developed and well-nourished  No distress  HENT:   Head: Normocephalic and atraumatic  Eyes: Pupils are equal, round, and reactive to light  EOM are normal  No scleral icterus  Neck: Normal range of motion  Neck supple  No JVD present  No thyromegaly present  Cardiovascular: Normal rate, regular rhythm and normal heart sounds  Exam reveals no gallop and no friction rub  No murmur heard  Pulmonary/Chest: Effort normal and breath sounds normal  No stridor  No respiratory distress  He has no wheezes  He has no rales  Abdominal: Soft  Bowel sounds are normal  He exhibits no distension and no mass  There is no tenderness  Musculoskeletal: Normal range of motion  He exhibits no edema or deformity  Neurological: He is alert and oriented to person, place, and time   Coordination normal    Skin: Skin is warm and dry  No erythema  No pallor  Psychiatric: He has a normal mood and affect  His behavior is normal        Lab Review:   Appointment on 12/05/2019   Component Date Value    PSA 12/05/2019 0 9     WBC 12/05/2019 10 67*    RBC 12/05/2019 4 64     Hemoglobin 12/05/2019 15 0     Hematocrit 12/05/2019 46 8     MCV 12/05/2019 101*    MCH 12/05/2019 32 3     MCHC 12/05/2019 32 1     RDW 12/05/2019 14 6     MPV 12/05/2019 9 3     Platelets 79/14/5594 297     nRBC 12/05/2019 0     Neutrophils Relative 12/05/2019 57     Immat GRANS % 12/05/2019 0     Lymphocytes Relative 12/05/2019 27     Monocytes Relative 12/05/2019 11     Eosinophils Relative 12/05/2019 4     Basophils Relative 12/05/2019 1     Neutrophils Absolute 12/05/2019 5 96     Immature Grans Absolute 12/05/2019 0 03     Lymphocytes Absolute 12/05/2019 2 89     Monocytes Absolute 12/05/2019 1 20     Eosinophils Absolute 12/05/2019 0 47     Basophils Absolute 12/05/2019 0 12*   Office Visit on 11/25/2019   Component Date Value    Sodium 12/05/2019 139     Potassium 12/05/2019 4 4     Chloride 12/05/2019 108     CO2 12/05/2019 26     ANION GAP 12/05/2019 5     BUN 12/05/2019 21     Creatinine 12/05/2019 1 26     Glucose, Fasting 12/05/2019 103*    Calcium 12/05/2019 9 7     AST 12/05/2019 23     ALT 12/05/2019 29     Alkaline Phosphatase 12/05/2019 85     Total Protein 12/05/2019 7 1     Albumin 12/05/2019 4 3     Total Bilirubin 12/05/2019 0 79     eGFR 12/05/2019 58     Cholesterol 12/05/2019 170     Triglycerides 12/05/2019 93     HDL, Direct 12/05/2019 40     LDL Calculated 12/05/2019 111*    Non-HDL-Chol (CHOL-HDL) 12/05/2019 130      No results found  Assessment:       1  Chest pain, unspecified type  POCT ECG    ranolazine (RANEXA) 500 mg 12 hr tablet    Cardiac catheterization   2  Coronary artery disease involving coronary bypass graft of native heart without angina pectoris  Cardiac catheterization   3  Coronary artery disease involving native coronary artery of native heart without angina pectoris  Cardiac catheterization   4  Angina pectoris ([facility] [address] )  Cardiac catheterization   5  S/P CABG (coronary artery bypass graft)     6  ICD (implantable cardioverter-defibrillator), single, in situ     7  Atherosclerosis of native artery of both lower extremities with intermittent claudication ([facility] [address] )     8  Tobacco abuse          Plan:       Aleksandra Johnson has unstable angina  Do not see much value in noninvasive stress testing  Will proceed with left heart catheterization directly  He understands risks benefits alternatives and wished to proceed  Hopefully December 10th blood work will be sufficient  I will see him after the catheterization  In the meantime I added Ranexa 500 b i d  if affordable

## 2020-01-09 NOTE — PATIENT INSTRUCTIONS
Angina   WHAT YOU NEED TO KNOW:   Angina is pain, pressure, or tightness that is usually felt in your chest  Chest pain may come on when you are stressed or do physical activities, such as walking or exercising  Angina is caused by decreased blood flow and oxygen to your heart  These are often caused by atherosclerosis (hardening of the arteries)  If left untreated, angina may get worse, increase your risk for a heart attack, or become life-threatening  DISCHARGE INSTRUCTIONS:   Call 911 if:   · You have any of the following signs of a heart attack:      ¨ Squeezing, pressure, or pain in your chest that lasts longer than 5 minutes or returns    ¨ Discomfort or pain in your back, neck, jaw, stomach, or arm     ¨ Trouble breathing    ¨ Nausea or vomiting    ¨ Lightheadedness or a sudden cold sweat, especially with chest pain or trouble breathing    · You have chest pain that does not go away after you take medicine as directed  · You lose feeling in your face, arms, or legs, or you suddenly feel weak  Return to the emergency department if:   · Your angina is happening more frequently, lasting longer, or causing worse pain  Contact your healthcare provider if:   · You are dizzy or nauseated after you take your medicine  · You have shortness of breath at rest     · You have new or worse swelling in your feet or ankles  · You have questions or concerns about your condition or care  Medicines: You may need any of the following:  · Antiplatelets , such as aspirin, help prevent blood clots  Take your antiplatelet medicine exactly as directed  These medicines make it more likely for you to bleed or bruise  If you are told to take aspirin, do not take acetaminophen or ibuprofen instead  · Blood thinners    help prevent blood clots  Examples of blood thinners include heparin and warfarin  Clots can cause strokes, heart attacks, and death   The following are general safety guidelines to follow while you are taking a blood thinner:    ¨ Watch for bleeding and bruising while you take blood thinners  Watch for bleeding from your gums or nose  Watch for blood in your urine and bowel movements  Use a soft washcloth on your skin, and a soft toothbrush to brush your teeth  This can keep your skin and gums from bleeding  If you shave, use an electric shaver  Do not play contact sports  ¨ Tell your dentist and other healthcare providers that you take anticoagulants  Wear a bracelet or necklace that says you take this medicine  ¨ Do not start or stop any medicines unless your healthcare provider tells you to  Many medicines cannot be used with blood thinners  ¨ Tell your healthcare provider right away if you forget to take the medicine, or if you take too much  ¨ Warfarin  is a blood thinner that you may need to take  The following are things you should be aware of if you take warfarin  § Foods and medicines can affect the amount of warfarin in your blood  Do not make major changes to your diet while you take warfarin  Warfarin works best when you eat about the same amount of vitamin K every day  Vitamin K is found in green leafy vegetables and certain other foods  Ask for more information about what to eat when you are taking warfarin  § You will need to see your healthcare provider for follow-up visits when you are on warfarin  You will need regular blood tests  These tests are used to decide how much medicine you need  · Other medicines  may be given to open the arteries to your heart, slow your heartbeat, or decrease your blood pressure or cholesterol  · Do not take certain medicines without asking your healthcare provider first   These include NSAIDs, herbal or vitamin supplements, or hormones (estrogen or progestin)  · Take your medicine as directed  Contact your healthcare provider if you think your medicine is not helping or if you have side effects   Tell him or her if you are allergic to any medicine  Keep a list of the medicines, vitamins, and herbs you take  Include the amounts, and when and why you take them  Bring the list or the pill bottles to follow-up visits  Carry your medicine list with you in case of an emergency  Follow up with your healthcare provider as directed:  Keep a record or a calendar with details about your chest pain  Every time you have pain or symptoms, record what the pain is like, how long it lasts, and how severe it is  Also record what you are doing when the pain starts, and what makes it go away  Bring this with you every time you see your healthcare provider  Write down your questions so you remember to ask them during your visits  Cardiac rehabilitation:  Your healthcare provider may recommend that you attend cardiac rehabilitation (rehab)  This is a program run by specialists who will help you safely strengthen your heart and prevent more heart disease  The plan includes exercise, relaxation, stress management, and heart-healthy nutrition  Healthcare providers will also check to make sure any medicines you are taking are working  The plan may also include instructions for when you can drive, return to work, and do other normal daily activities  Manage angina:   · Do not smoke  Nicotine and other chemicals in cigarettes and cigars can cause heart and lung damage  Ask your healthcare provider for information if you currently smoke and need help to quit  E-cigarettes or smokeless tobacco still contain nicotine  Talk to your healthcare provider before you use these products  · Maintain a healthy weight  When you weigh more than is healthy for you, your heart must work harder  You are at higher risk for serious health problems if you are overweight  Ask your healthcare provider how much you should weigh  Ask him to help you create a weight loss plan if you are overweight  · Ask about activity    Your healthcare provider will tell you which activities to limit or avoid  Ask about the best exercise plan for you  · Eat heart-healthy foods  Include fresh fruits and vegetables in your meal plan  Choose low-fat foods, such as skim or 1% fat milk, low-fat cheese and yogurt, fish, chicken (without skin), and lean meats  Eat two 4-ounce servings of fish high in omega-3 fats each week, such as salmon, fresh tuna, and herring  Do not eat foods that are high in sodium, such as canned foods, potato chips, salty snacks, and cold cuts  Put less table salt on your food  · Avoid activities that cause angina  Pay attention to your symptoms and find out what seems to make your angina worse  · Ask if you should have a flu vaccine  The flu vaccine will decrease your risk for an infection  An infection can put more stress on your heart and worsen your angina  © 2017 [employer] Information is for End User's use only and may not be sold, redistributed or otherwise used for commercial purposes  All illustrations and images included in CareNotes® are the copyrighted property of [employer]  or [employer] Yin  The above information is an  only  It is not intended as medical advice for individual conditions or treatments  Talk to your doctor, nurse or pharmacist before following any medical regimen to see if it is safe and effective for you

## 2020-02-20 NOTE — PATIENT INSTRUCTIONS
Coronary Artery Disease   AMBULATORY CARE:   Coronary artery disease (CAD)  is narrowing of the arteries to your heart caused by a buildup of plaque  Plaque is made up of cholesterol and other substances  The narrowing in your arteries decreases the amount of blood that can flow to your heart  This causes your heart to get less oxygen  You may not have any symptoms of CAD  It is important for you to manage CAD even if you feel well  CAD can lead to a heart attack if it is not managed  Common symptoms include the following:   · Chest pain (angina), causing burning, squeezing, or crushing tightness in your chest    · Pain that spreads to your neck, jaw, or shoulder blade    · Nausea, vomiting, sweating, fainting, and hands and feet that are cold to the touch  Call 911 for any of the following:   · You have any of the following signs of a heart attack:      ¨ Squeezing, pressure, or pain in your chest that lasts longer than 5 minutes or returns    ¨ Discomfort or pain in your back, neck, jaw, stomach, or arm     ¨ Trouble breathing    ¨ Nausea or vomiting    ¨ Lightheadedness or a sudden cold sweat, especially with chest pain or trouble breathing    Contact your healthcare provider if:   · You have chest pain that is more frequent, or you have chest pain at rest     · You have questions or concerns about your condition or care  Medicines used to treat CAD:   · Blood pressure medicines  are given to lower your blood pressure  ACE inhibitors help keep your blood vessels relaxed and open to help keep blood flowing into your heart  Beta-blockers keep your heart pumping strongly and regularly so it does not work too hard to get oxygen  · Cholesterol medicines  help lower blood cholesterol levels  · Nitrates , such as nitroglycerin, relax the arteries of your heart so it gets more oxygen  They help to relieve your chest pain  · Antiplatelets , such as aspirin, help prevent blood clots   Take your antiplatelet medicine exactly as directed  These medicines make it more likely for you to bleed or bruise  If you are told to take aspirin, do not take acetaminophen or ibuprofen instead  · Blood thinners  keep clots from forming in your blood  Clots may cause heart attacks, strokes, or death  This medicine makes it more likely for you to bleed or bruise  · Do not take certain medicines without asking your healthcare provider first   These include NSAIDs, herbal or vitamin supplements, or hormones (estrogen or progestin)  Procedures used to treat CAD:   · Angioplasty  may be done to open an artery blocked by plaque  A tube with a balloon on the end is threaded into the blocked artery  Once the tube is in the artery, the balloon is inflated  As the balloon inflates, it presses the plaque against the artery wall to open the artery  A stent may be placed in your artery to keep it open  · Coronary artery bypass graft surgery (CABG)  is open heart surgery  Healthcare providers take arteries or veins from other areas in your body and use them to bypass or go around the blocked arteries of your heart  Cardiac rehabilitation:  Your healthcare provider may recommend that you attend cardiac rehabilitation (rehab)  This is a program run by specialists who will help you safely strengthen your heart and reduce the risk for more heart disease  The plan includes exercise, relaxation, stress management, and heart-healthy nutrition  Healthcare providers will also check to make sure any medicines you are taking are working  Manage CAD to prevent a heart attack:   · Do not smoke  Nicotine and other chemicals in cigarettes and cigars can cause heart and lung damage  Ask your healthcare provider for information if you currently smoke and need help to quit  E-cigarettes or smokeless tobacco still contain nicotine  Talk to your healthcare provider before you use these products  · Exercise regularly    Exercise at least 30 minutes each day, on most days of the week  Exercise helps to lower high cholesterol and high blood pressure  It can also help you maintain a healthy weight  Ask your healthcare provider about the kind of exercise you should do and how to get started  · Maintain a healthy weight  If you are overweight, talk to your healthcare provider about how to lose weight  A weight loss of 10% can improve your heart health  · Eat heart-healthy foods  Include fresh fruits and vegetables in your meal plan  Choose low-fat foods, such as skim or 1% fat milk, low-fat cheese and yogurt, fish, chicken (without skin), and lean meats  Eat two 4-ounce servings of fish high in omega-3 fats each week, such as salmon, fresh tuna, and herring  Do not eat foods that are high in sodium, such as canned foods, potato chips, salty snacks, and cold cuts  Put less table salt on your food  · Limit or do not drink alcohol  A drink of alcohol is 12 ounces of beer, 5 ounces of wine, or 1½ ounces of liquor  · Manage other health conditions  Follow your healthcare provider's advice on how to manage other conditions that can affect your heart health  These include diabetes, high blood pressure, and high cholesterol  You may need to take medicines for these conditions and make other lifestyle changes  · Ask if you should have a flu vaccine  The flu can be dangerous for a person who has CAD  The flu vaccine is available every year in the fall  Follow up with your healthcare provider as directed: You may need to return for other tests  You may also be referred to a cardiac surgeon  Write down your questions so you remember to ask them during your visits  © 2017 [employer]  Information is for End User's use only and may not be sold, redistributed or otherwise used for commercial purposes  All illustrations and images included in CareNotes® are the copyrighted property of [employer]  or [employer] Yin    The above information is an  only  It is not intended as medical advice for individual conditions or treatments  Talk to your doctor, nurse or pharmacist before following any medical regimen to see if it is safe and effective for you

## 2020-02-20 NOTE — PROGRESS NOTES
Subjective:        Patient ID: Everton Braxton is a 71 y o  male  Chief Complaint:  Irene Lee is status post om2 stenting due to InStent restenoses  Previous chronic occlusions again demonstrated  Patent LIMA with left and right-sided collaterals  He was continued on his novel oral anticoagulant and Plavix therapy post stenting  He feels much better, no recurrent angina  He has little tired and fatigued which she get his energy back but we both talk about how this is likely due to his ischemic cardiomyopathy  He is trying to do all he can to quit smoking he did for a few days but now is having 1 here in then, he knows he should quit  No edema orthopnea no fevers or chills, no left wrist catheterization site issues  Radial pulse normal     The following portions of the patient's history were reviewed and updated as appropriate: allergies, current medications, past family history, past medical history, past social history, past surgical history and problem list   Review of Systems   Constitution: Positive for malaise/fatigue  Negative for chills, diaphoresis and weight gain  HENT: Negative for nosebleeds and stridor  Eyes: Negative for double vision, vision loss in left eye, vision loss in right eye and visual disturbance  Cardiovascular: Negative for chest pain, claudication, cyanosis, dyspnea on exertion, irregular heartbeat, leg swelling, near-syncope, orthopnea, palpitations, paroxysmal nocturnal dyspnea and syncope  Respiratory: Negative for cough, shortness of breath, snoring and wheezing  Endocrine: Negative for polydipsia, polyphagia and polyuria  Hematologic/Lymphatic: Negative for bleeding problem  Does not bruise/bleed easily  Skin: Negative for flushing and rash  Musculoskeletal: Negative for falls and myalgias  Gastrointestinal: Negative for abdominal pain, heartburn, hematemesis, hematochezia, melena and nausea  Genitourinary: Negative for hematuria     Neurological: Negative for brief paralysis, dizziness, focal weakness, headaches, light-headedness, loss of balance and vertigo  Psychiatric/Behavioral: Negative for altered mental status and substance abuse  Allergic/Immunologic: Negative for hives  Objective:      /80   Pulse 70   Ht 5' 10" (1 778 m)   Wt 83 9 kg (185 lb)   BMI 26 54 kg/m²   Physical Exam   Constitutional: He is oriented to person, place, and time  He appears well-developed and well-nourished  No distress  HENT:   Head: Normocephalic and atraumatic  Eyes: Pupils are equal, round, and reactive to light  EOM are normal  No scleral icterus  Neck: Normal range of motion  Neck supple  No JVD present  No thyromegaly present  Cardiovascular: Normal rate, regular rhythm and normal heart sounds  Exam reveals no gallop and no friction rub  No murmur heard  Pulmonary/Chest: Effort normal and breath sounds normal  No stridor  No respiratory distress  He has no wheezes  He has no rales  Abdominal: Soft  Bowel sounds are normal  He exhibits no distension and no mass  There is no tenderness  Musculoskeletal: Normal range of motion  He exhibits no edema or deformity  Neurological: He is alert and oriented to person, place, and time  Coordination normal    Skin: Skin is warm and dry  No erythema  No pallor  Psychiatric: He has a normal mood and affect   His behavior is normal        Lab Review:   Orders Only on 01/09/2020   Component Date Value    Sodium 01/10/2020 137     Potassium 01/10/2020 4 1     Chloride 01/10/2020 107     CO2 01/10/2020 24     ANION GAP 01/10/2020 6     BUN 01/10/2020 22     Creatinine 01/10/2020 1 29     Glucose, Fasting 01/10/2020 76     Calcium 01/10/2020 9 4     eGFR 01/10/2020 56     WBC 01/10/2020 10 06     RBC 01/10/2020 5 06     Hemoglobin 01/10/2020 16 4     Hematocrit 01/10/2020 49 8*    MCV 01/10/2020 98     MCH 01/10/2020 32 4     MCHC 01/10/2020 32 9     RDW 01/10/2020 14 3     MPV 01/10/2020 9 0     Platelets 71/47/3737 317     nRBC 01/10/2020 0     Neutrophils Relative 01/10/2020 52     Immat GRANS % 01/10/2020 0     Lymphocytes Relative 01/10/2020 28     Monocytes Relative 01/10/2020 13*    Eosinophils Relative 01/10/2020 5     Basophils Relative 01/10/2020 2*    Neutrophils Absolute 01/10/2020 5 22     Immature Grans Absolute 01/10/2020 0 04     Lymphocytes Absolute 01/10/2020 2 84     Monocytes Absolute 01/10/2020 1 27*    Eosinophils Absolute 01/10/2020 0 53     Basophils Absolute 01/10/2020 0 16*     No results found  Assessment:       1  Other fatigue  TSH, 3rd generation with Free T4 reflex   2  Ischemic cardiomyopathy     3  PAF (paroxysmal atrial fibrillation) ([facility] [address] )     4  VT (ventricular tachycardia) ([facility] [address] )     5  Chronic ischemic heart disease     6  S/P CABG (coronary artery bypass graft)     7  ICD (implantable cardioverter-defibrillator), single, in situ          Plan:       I ordered a TSH for completeness, hypothyroidism post remote CABG not impossible and last checked 2 years ago  See HPI, suspect just general fatigue from his advanced cardiomyopathy, he understands  If worsens he will let you or I know as he might than require more general workup  I strongly recommended smoking cessation  I stopped his Ranexa today, told him to keep this and resume it immediately should he get any recurrent angina  He understands  He has sublingual nitroglycerin and knows proper usage  He will continue with novel oral anticoagulant therapy and Plavix therapy every day  Knows not to stop this unless significant bleeding arises  I will see him back in 3 months he will call sooner with any issues

## 2020-03-02 NOTE — TELEPHONE ENCOUNTER
Patient with sinus avn, asking for zpack    Epinephrine Not Specified     Nifedipine Not Specified  Procardia   Penicillins Not Specified

## 2020-03-18 NOTE — PROGRESS NOTES
[facility] remote check ICD  No events  Normal battery function  Current Outpatient Medications:     atorvastatin (LIPITOR) 40 mg tablet, Take 1 tablet (40 mg total) by mouth daily, Disp: 90 tablet, Rfl: 3    clopidogrel (PLAVIX) 75 mg tablet, Take 1 tablet (75 mg total) by mouth daily, Disp: 90 tablet, Rfl: 3    enalapril (VASOTEC) 5 mg tablet, Take 1 tablet (5 mg total) by mouth daily, Disp: 90 tablet, Rfl: 3    furosemide (LASIX) 20 mg tablet, TAKE 1 TABLET BY MOUTH  DAILY, Disp: 90 tablet, Rfl: 0    isosorbide mononitrate (IMDUR) 30 mg 24 hr tablet, Take 1 tablet (30 mg total) by mouth 2 (two) times a day, Disp: 180 tablet, Rfl: 3    metoprolol succinate (TOPROL-XL) 100 mg 24 hr tablet, Take 1 tablet (100 mg total) by mouth daily, Disp: 90 tablet, Rfl: 3    metoprolol succinate (TOPROL-XL) 25 mg 24 hr tablet, Take 1 tablet (25 mg total) by mouth daily Take in PM, Disp: 90 tablet, Rfl: 3    mexiletine (MEXITIL) 150 mg capsule, Take 150 mg by mouth One twice a day, Disp: , Rfl:     nitroglycerin (NITROSTAT) 0 4 mg SL tablet, DISSOLVE ONE TABLET UNDER THE TONGUE EVERY 5 MINUTES AS NEEDED FOR CHEST PAIN    DO NOT EXCEED A TOTAL OF 3 DOSES IN 15 MINUTES, Disp: 25 tablet, Rfl: 2    nitroglycerin (NITROSTAT) 0 4 mg SL tablet, Place 1 tablet (0 4 mg total) under the tongue every 5 (five) minutes as needed for chest pain, Disp: 100 tablet, Rfl: 1    rivaroxaban (XARELTO) 20 mg tablet, Take 1 tablet (20 mg total) by mouth daily, Disp: 90 tablet, Rfl: 3    spironolactone (ALDACTONE) 25 mg tablet, Take 0 5 tablets (12 5 mg total) by mouth daily, Disp: 45 tablet, Rfl: 3

## 2020-05-21 PROBLEM — I48.19 PERSISTENT ATRIAL FIBRILLATION (HCC): Status: ACTIVE | Noted: 2018-04-05

## 2020-08-14 NOTE — PROGRESS NOTES
Subjective:        Patient ID: Rosita Blevins is a 71 y o  male  Chief Complaint:  Margaret Oglesby presents with 6 week history of classic crescendo angina pectoris  He is using up to 4 nitroglycerines at a time, eventually they work he says  He cannot do the things he wants to do, he cannot even mow the lawn, has to stop he says  Exertional mid and lower substernal chest pain like he had prior to his last stent earlier this year  He is down to 1 or 2 cigarettes a day, trying to quit  Has not missed any medications  Has no clinical signs or symptoms of GI bleeding/blood loss  Examines euvolemic  No palpitations nor shocks  EKG shows AFib with slow ventricular response around 48 to 50 beats per minute V paced on demand,, nonspecific IVCD, nonspecific lateral ST changes    Last catheterization;    Normal resting hemodynamics  Severe native three vessel coronary artery disease  Chronic total occlusion of the proximal LAD  Chronic total occlusion of the proximal circumflex after the first   marginal branch  Chronic total occlusion of the proximal right coronary artery  Patent LIMA to the mid LAD with good distal run-off and left to right   collaterals and collaterals to the distal circumflex  Chronic total occlusions of the proximal vein bypass grafts to the right   coronary artery and diagonal branches, respectively  Severe 95% proximal in-stent stenosis of the vein bypass graft to the   second obtuse marginal branch of the circumflex  Successful PCI of the proximal in-stent stenosis restoring Alexy III flow   to the distal vessel and branches with implantation of a 4 0 x 28 mm JED   inside the previous 4 0 x 16 mm JED resulting in < 10% residual stenosis      The following portions of the patient's history were reviewed and updated as appropriate: allergies, current medications, past family history, past medical history, past social history, past surgical history and problem list   Review of Systems   Constitution: Positive for malaise/fatigue  Negative for chills, diaphoresis and weight gain  HENT: Negative for nosebleeds and stridor  Eyes: Negative for double vision, vision loss in left eye, vision loss in right eye and visual disturbance  Cardiovascular: Positive for chest pain  Negative for claudication, cyanosis, dyspnea on exertion, irregular heartbeat, leg swelling, near-syncope, orthopnea, palpitations, paroxysmal nocturnal dyspnea and syncope  Respiratory: Negative for cough, shortness of breath, snoring and wheezing  Endocrine: Negative for polydipsia, polyphagia and polyuria  Hematologic/Lymphatic: Negative for bleeding problem  Does not bruise/bleed easily  Skin: Negative for flushing and rash  Musculoskeletal: Negative for falls and myalgias  Gastrointestinal: Negative for abdominal pain, heartburn, hematemesis, hematochezia, melena and nausea  Genitourinary: Negative for hematuria  Neurological: Negative for brief paralysis, dizziness, focal weakness, headaches, light-headedness, loss of balance and vertigo  Psychiatric/Behavioral: Negative for altered mental status and substance abuse  Allergic/Immunologic: Negative for hives  Objective:      /80   Pulse 60   Ht 5' 10" (1 778 m)   Wt 83 kg (183 lb)   BMI 26 26 kg/m²   Physical Exam   Constitutional: He is oriented to person, place, and time  He appears well-developed and well-nourished  No distress  HENT:   Head: Normocephalic and atraumatic  Eyes: Pupils are equal, round, and reactive to light  EOM are normal  No scleral icterus  Neck: Normal range of motion  Neck supple  No JVD present  No thyromegaly present  Cardiovascular: Normal rate, regular rhythm and normal heart sounds  Exam reveals no gallop and no friction rub  No murmur heard  Pulmonary/Chest: Effort normal and breath sounds normal  No stridor  No respiratory distress  He has no wheezes  He has no rales  Abdominal: Soft   Bowel sounds are normal  He exhibits no distension and no mass  There is no abdominal tenderness  Musculoskeletal: Normal range of motion  General: No deformity or edema  Neurological: He is alert and oriented to person, place, and time  Coordination normal    Skin: Skin is warm and dry  No erythema  No pallor  Psychiatric: He has a normal mood and affect  His behavior is normal        Lab Review:   Appointment on 07/30/2020   Component Date Value    ALT 07/30/2020 26     AST 07/30/2020 18     LDL Direct 07/30/2020 84      No results found  Assessment:       1  Angina of effort ([facility] [address] )  CBC and differential    Basic metabolic panel    Cardiac catheterization    Troponin I   2  Essential hypertension  POCT ECG   3  Ischemic cardiomyopathy     4  Angina pectoris ([facility] [address] )     5  Persistent atrial fibrillation [facility] )          Plan:       Shun Varela has complex CAD post remote CABG, recent prior catheterization as noted above in HPI  His symptoms have generally over the years been superior to my noninvasive testing  He opted to proceed directly to catheterization when offered, with this crescendo pattern I think it is more than reasonable  Going to rule out significant anemia as he is on NOAC and Plavix therapy  Will check BMP and troponin as well  He can hold his novel oral anticoagulant for a day pre cath, he [place] [facility] [place] know him so well which I will certainly oblige  He examines in no heart failure, appears to be in no distress currently is comfortable, certainly not tachycardic, and his meds are voluminous  If there is nothing amenable to revascularization we will likely entertain Ranexa therapy post procedure  He certainly has some residual CAD that could explain chronic angina though he was feeling well for a half a year prior to the new symptoms recently suspicious for stent compromise or new subacute lesion      He knows to go to the emergency room if a 3rd nitroglycerin does not immediately relieve his chest pain  In the meantime I told to try and take it a bit more easy until the catheterization is completed, avoiding activities that precipitated chest pain in the last month and a half  Return office 1 months sooner as needed

## 2020-08-21 NOTE — TELEPHONE ENCOUNTER
nitro   Received:  Today   Message Contents   Esthela PINZON [facility]               Nitro 0 4 mg   [facility]   Dr Eleni Masters

## 2020-09-08 NOTE — PROGRESS NOTES
[facility] remote check ICD  A-fib on xarelto  Normal battery function      Current Outpatient Medications:     atorvastatin (LIPITOR) 80 mg tablet, Take 1 tablet (80 mg total) by mouth daily, Disp: 90 tablet, Rfl: 3    clopidogrel (PLAVIX) 75 mg tablet, Take 1 tablet (75 mg total) by mouth daily, Disp: 90 tablet, Rfl: 3    enalapril (VASOTEC) 5 mg tablet, Take 1 tablet (5 mg total) by mouth daily, Disp: 90 tablet, Rfl: 3    furosemide (LASIX) 20 mg tablet, Take 1 tablet (20 mg total) by mouth daily, Disp: 90 tablet, Rfl: 3    isosorbide mononitrate (IMDUR) 30 mg 24 hr tablet, TAKE 1 TABLET BY MOUTH TWO  TIMES DAILY, Disp: 180 tablet, Rfl: 3    metoprolol succinate (TOPROL-XL) 100 mg 24 hr tablet, Take 1 tablet (100 mg total) by mouth daily, Disp: 90 tablet, Rfl: 3    metoprolol succinate (TOPROL-XL) 25 mg 24 hr tablet, Take 1 tablet (25 mg total) by mouth daily Take in PM, Disp: 90 tablet, Rfl: 3    mexiletine (MEXITIL) 150 mg capsule, Take 150 mg by mouth One twice a day, Disp: , Rfl:     nitroglycerin (NITROSTAT) 0 4 mg SL tablet, Place 1 tablet (0 4 mg total) under the tongue every 5 (five) minutes as needed for chest pain, Disp: 100 tablet, Rfl: 1    nitroglycerin (NITROSTAT) 0 4 mg SL tablet, Place 1 tablet (0 4 mg total) under the tongue every 5 (five) minutes as needed for chest pain Do not exceeda total of 3 doses in 15 minutes, Disp: 25 tablet, Rfl: 5    spironolactone (ALDACTONE) 25 mg tablet, Take 0 5 tablets (12 5 mg total) by mouth daily, Disp: 45 tablet, Rfl: 3    Xarelto 20 MG tablet, TAKE 1 TABLET BY MOUTH  DAILY, Disp: 90 tablet, Rfl: 3

## 2020-09-28 NOTE — PATIENT INSTRUCTIONS
Check on cost of    Pradaxa 150 mg BID  Vs  Eliquis 5 mg BID      Dilated Cardiomyopathy   WHAT YOU NEED TO KNOW:   What is dilated cardiomyopathy? Dilated cardiomyopathy (DCM) develops when one or both ventricles (lower chambers of your heart) are damaged and become enlarged  The enlarged ventricles are too weak to pump blood to your body  What increases my risk for DCM? · Family history of DCM     · Conditions that cause heart damage, such as coronary artery disease     · Infections such as HIV or toxoplasmosis    · Alcohol or drug abuse     · Long-term conditions, such as diabetes, hypothyroidism, autoimmune disorders, or sleep apnea    · Medicines such as chemotherapy or antivirals  What are the signs and symptoms of DCM? · Coughing, wheezing, or trouble breathing     · Increasing fatigue and weakness    · Swelling in your legs, ankles, or fingers    · Feeling of fullness and no appetite    · Unexplained weight gain    · Fast or fluttering heartbeat  How is DCM diagnosed? · An x-ray or MRI  will show the size and thickness of your ventricles and if your heart is enlarged  If you have an MRI, you may be given dye to help the pictures show up better  Tell the healthcare provider if you have ever had an allergic reaction to contrast dye  Do not enter the MRI room with anything metal  Metal can cause serious injury  Tell the healthcare provider if you have any metal in or on your body  · An echocardiogram  uses sound waves to take pictures of your heart  The pictures show the size of your ventricles, and how well they are pumping blood  · Cardiac catheterization  is a procedure used to see inside your heart and its blood vessels  X-rays and dye injected into your heart's blood vessels allow your healthcare provider to see blood move through your heart  Tell the healthcare provider if you have ever had an allergic reaction to contrast dye    What medicines are used to treat DCM?   · Diuretics  help your body release fluid that has built up  · Blood pressure medicines  lower your blood pressure and improve blood flow through your heart  · Heart medicines  help regulate your heart rhythm and strengthen your heartbeat  · Blood thinners    help prevent blood clots  Examples of blood thinners include heparin and warfarin  Clots can cause strokes, heart attacks, and death  The following are general safety guidelines to follow while you are taking a blood thinner:    ¨ Watch for bleeding and bruising while you take blood thinners  Watch for bleeding from your gums or nose  Watch for blood in your urine and bowel movements  Use a soft washcloth on your skin, and a soft toothbrush to brush your teeth  This can keep your skin and gums from bleeding  If you shave, use an electric shaver  Do not play contact sports  ¨ Tell your dentist and other healthcare providers that you take anticoagulants  Wear a bracelet or necklace that says you take this medicine  ¨ Do not start or stop any medicines unless your healthcare provider tells you to  Many medicines cannot be used with blood thinners  ¨ Tell your healthcare provider right away if you forget to take the medicine, or if you take too much  ¨ Warfarin  is a blood thinner that you may need to take  The following are things you should be aware of if you take warfarin  § Foods and medicines can affect the amount of warfarin in your blood  Do not make major changes to your diet while you take warfarin  Warfarin works best when you eat about the same amount of vitamin K every day  Vitamin K is found in green leafy vegetables and certain other foods  Ask for more information about what to eat when you are taking warfarin  § You will need to see your healthcare provider for follow-up visits when you are on warfarin  You will need regular blood tests  These tests are used to decide how much medicine you need  What other treatments might I need? · Cardiac rehab  is a program that will help you safely strengthen your heart  This plan includes exercise, relaxation, stress management, and heart-healthy nutrition instructions  Healthcare providers will make sure your medicines are helping to reduce your symptoms  · A surgically implanted device , such as a pacemaker or ventricular assist device (VAD), may be placed in your chest  The device may regulate your heartbeat or help your heart pump blood to your body  · Surgery  may be done to treat other conditions and reduce your symptoms  How do I manage my DCM? · Weigh yourself every morning  Use the same scale, in the same spot  Weigh yourself after you use the bathroom, but before you eat or drink anything  Wear the same type of clothing each day  Do not wear shoes  Keep a record of your daily weights so you will notice sudden weight gain  Bring the record to appointments with your healthcare providers  Swelling and weight gain are signs of fluid retention  If you are overweight, ask your healthcare provider how to lose weight safely  · Eat heart-healthy foods and limit sodium (salt)  Eat more fresh fruits and vegetables and fewer canned and processed foods  Replace butter and margarine with heart-healthy oils such as olive oil and canola oil  Other heart-healthy foods include walnuts, fatty fish such as salmon and tuna, whole-grain breads, low-fat dairy products, beans, and lean meats  You may need to eat less than 2 grams of salt per day  Do not use salt substitutes  Ask your healthcare provider for more information on heart-healthy and low-salt diets  · Limit alcohol  Alcohol weakens your heart  Ask your healthcare provider if it is safe for you to drink any alcohol  If it is safe, talk to him about how much alcohol is safe for you  · Do not smoke  If you smoke, it is never too late to quit  Smoking weakens your heart and makes shortness of breath and other symptoms worse   Ask your healthcare provider for information if you need help quitting  · Manage other health conditions  Diabetes, sleep apnea, and other heart conditions can put more stress on your heart if not managed  When should I contact my healthcare provider? · You have more trouble breathing while you do your daily activities or exercise  · You have new swelling in your legs, ankles, or fingers  · You gain 2 or more pounds in a day  · You have constant pain or fullness in your abdomen, or you lose your appetite  · You have questions or concerns about your condition or care  When should I or someone else seek immediate care or call 911? · You have any of the following signs of a heart attack:      ¨ Squeezing, pressure, or pain in your chest that lasts longer than 5 minutes or returns    ¨ Discomfort or pain in your back, neck, jaw, stomach, or arm     ¨ Trouble breathing    ¨ Nausea or vomiting    ¨ Lightheadedness or a sudden cold sweat, especially with chest pain or trouble breathing    · You have any of the following signs of a stroke:      ¨ Numbness or drooping on one side of your face     ¨ Weakness in an arm or leg    ¨ Confusion or difficulty speaking    ¨ Dizziness, a severe headache, or vision loss    · You cough up blood  · You are weak, sweaty, or pale, with cold feet or hands  · You lose consciousness  CARE AGREEMENT:   You have the right to help plan your care  Learn about your health condition and how it may be treated  Discuss treatment options with your caregivers to decide what care you want to receive  You always have the right to refuse treatment  The above information is an  only  It is not intended as medical advice for individual conditions or treatments  Talk to your doctor, nurse or pharmacist before following any medical regimen to see if it is safe and effective for you    © 2017 [employer] Information is for End User's use only and may not be sold, redistributed or otherwise used for commercial purposes  All illustrations and images included in CareNotes® are the copyrighted property of A [employer]  or Patrice Yin

## 2020-09-28 NOTE — PROGRESS NOTES
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   Subclavian   nonselective injection utilizing a Vitek catheter gave adequate   opacification and demonstrated the left internal mammary artery patent in   the downstream LAD white wise patent and unchanged from previous cath   several months ago  Saphenous vein bypass graft intervention from several months ago   demonstrated a high-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 in focal fashion the   saphenous graft beyond this point had minor irregularities but brisk flow   into a relatively speaking unobstructed marginal circumflex system    A filter wire EZ was placed through the right coronary artery guide and   after predilatation with a 3 5 balloon a 4 0 x 20 mm Synergy stent was   placed and inflated to 20 manisha yielding a 0% residual stenosis   Following   postdilatation, the filter wire EZ was recaptured and removed   demonstrating some particulate matter   Final angiography following filter   removal demonstrated FREDDIE grade III flow and a 0% residual stenosis  Antithrombotic- Angiomax    Hemostasis- manual     The patient tolerated procedure well    The following portions of the patient's history were reviewed and updated as appropriate: allergies, current medications, past family history, past medical history, past social history, past surgical history and problem list   Review of Systems   Constitution: Negative for chills, diaphoresis, malaise/fatigue and weight gain  HENT: Negative for nosebleeds and stridor      Eyes: Negative for double vision, vision loss in left eye, vision loss in right eye and visual disturbance  Cardiovascular: Negative for chest pain, claudication, cyanosis, dyspnea on exertion, irregular heartbeat, leg swelling, near-syncope, orthopnea, palpitations, paroxysmal nocturnal dyspnea and syncope  Respiratory: Negative for cough, shortness of breath, snoring and wheezing  Endocrine: Negative for polydipsia, polyphagia and polyuria  Hematologic/Lymphatic: Negative for bleeding problem  Does not bruise/bleed easily  Skin: Negative for flushing and rash  Musculoskeletal: Negative for falls and myalgias  Gastrointestinal: Negative for abdominal pain, heartburn, hematemesis, hematochezia, melena and nausea  Genitourinary: Negative for hematuria  Neurological: Negative for brief paralysis, dizziness, focal weakness, headaches, light-headedness, loss of balance and vertigo  Psychiatric/Behavioral: Negative for altered mental status and substance abuse  Allergic/Immunologic: Negative for hives  Objective:      /72   Pulse 60   Ht 5' 10" (1 778 m)   Wt 84 4 kg (186 lb)   BMI 26 69 kg/m²   Physical Exam   Constitutional: He is oriented to person, place, and time  He appears well-developed and well-nourished  No distress  HENT:   Head: Normocephalic and atraumatic  Eyes: Pupils are equal, round, and reactive to light  EOM are normal  No scleral icterus  Neck: Normal range of motion  Neck supple  No JVD present  No thyromegaly present  Cardiovascular: Normal rate, regular rhythm and normal heart sounds  Exam reveals no gallop and no friction rub  No murmur heard  Pulmonary/Chest: Effort normal and breath sounds normal  No stridor  No respiratory distress  He has no wheezes  He has no rales  Abdominal: Soft  Bowel sounds are normal  He exhibits no distension and no mass  There is no abdominal tenderness  Musculoskeletal: Normal range of motion  General: No deformity or edema     Neurological: He is alert and oriented to person, place, and time  Coordination normal    Skin: Skin is warm and dry  No erythema  No pallor  Psychiatric: He has a normal mood and affect  His behavior is normal        Lab Review:   Office Visit on 08/14/2020   Component Date Value    WBC 08/14/2020 9 83     RBC 08/14/2020 4 93     Hemoglobin 08/14/2020 15 7     Hematocrit 08/14/2020 48 2     MCV 08/14/2020 98     MCH 08/14/2020 31 8     MCHC 08/14/2020 32 6     RDW 08/14/2020 15 0     MPV 08/14/2020 9 8     Platelets 56/25/6642 307     nRBC 08/14/2020 0     Neutrophils Relative 08/14/2020 55     Immat GRANS % 08/14/2020 0     Lymphocytes Relative 08/14/2020 23     Monocytes Relative 08/14/2020 16*    Eosinophils Relative 08/14/2020 5     Basophils Relative 08/14/2020 1     Neutrophils Absolute 08/14/2020 5 34     Immature Grans Absolute 08/14/2020 0 04     Lymphocytes Absolute 08/14/2020 2 29     Monocytes Absolute 08/14/2020 1 56*    Eosinophils Absolute 08/14/2020 0 50     Basophils Absolute 08/14/2020 0 10     Sodium 08/14/2020 137     Potassium 08/14/2020 4 3     Chloride 08/14/2020 106     CO2 08/14/2020 25     ANION GAP 08/14/2020 6     BUN 08/14/2020 27*    Creatinine 08/14/2020 1 41*    Glucose 08/14/2020 108     Calcium 08/14/2020 9 1     eGFR 08/14/2020 50    Appointment on 07/30/2020   Component Date Value    ALT 07/30/2020 26     AST 07/30/2020 18     LDL Direct 07/30/2020 84      No results found  Assessment:       1  H/O heart artery stent     2  Ischemic cardiomyopathy  isosorbide mononitrate (IMDUR) 30 mg 24 hr tablet   3  Angina pectoris (HCC)  isosorbide mononitrate (IMDUR) 30 mg 24 hr tablet   4  Chronic atrial fibrillation (HCC)     5  S/P CABG (coronary artery bypass graft)     6  ICD (implantable cardioverter-defibrillator), single, in situ     7  Mixed hyperlipidemia          Plan:       Laura Millan is doing remarkably well status post another coronary intervention    He remains angina free without any signs or symptoms of heart failure nor new/acute dysrhythmia  Ventricular rate is controlled, he is fully anticoagulated  Meds optimal, reviewed in detail today, I recommended no changes  Will see him back in 6 months, he will call me sooner with any concerning potential cardiac symptoms in the meantime

## 2020-12-02 PROBLEM — U07.1 COVID-19: Status: ACTIVE | Noted: 2020-01-01

## 2020-12-03 LAB
ATRIAL RATE: 111 BPM
ATRIAL RATE: 140 BPM
PR INTERVAL: 126 MS
QRS AXIS: -82 DEGREES
QRS AXIS: 240 DEGREES
QRSD INTERVAL: 126 MS
QRSD INTERVAL: 68 MS
QT INTERVAL: 332 MS
QT INTERVAL: 350 MS
QTC INTERVAL: 506 MS
QTC INTERVAL: 530 MS
T WAVE AXIS: 136 DEGREES
T WAVE AXIS: 27 DEGREES
VENTRICULAR RATE: 138 BPM
VENTRICULAR RATE: 140 BPM

## 2020-12-05 LAB
BACTERIA BLD CULT: ABNORMAL
GRAM STN SPEC: ABNORMAL

## 2020-12-07 LAB — BACTERIA BLD CULT: NORMAL
